# Patient Record
Sex: MALE | Race: WHITE | NOT HISPANIC OR LATINO
[De-identification: names, ages, dates, MRNs, and addresses within clinical notes are randomized per-mention and may not be internally consistent; named-entity substitution may affect disease eponyms.]

---

## 2017-02-16 ENCOUNTER — RX RENEWAL (OUTPATIENT)
Age: 82
End: 2017-02-16

## 2017-02-25 ENCOUNTER — EMERGENCY (EMERGENCY)
Facility: HOSPITAL | Age: 82
LOS: 1 days | Discharge: HOME | End: 2017-02-25
Admitting: EMERGENCY MEDICINE
Payer: MEDICARE

## 2017-02-25 ENCOUNTER — LABORATORY RESULT (OUTPATIENT)
Age: 82
End: 2017-02-25

## 2017-02-25 PROCEDURE — 85027 COMPLETE CBC AUTOMATED: CPT

## 2017-02-25 PROCEDURE — 80053 COMPREHEN METABOLIC PANEL: CPT

## 2017-02-25 PROCEDURE — 86900 BLOOD TYPING SEROLOGIC ABO: CPT

## 2017-02-25 PROCEDURE — 85730 THROMBOPLASTIN TIME PARTIAL: CPT

## 2017-02-25 PROCEDURE — 85610 PROTHROMBIN TIME: CPT

## 2017-02-25 PROCEDURE — 86850 RBC ANTIBODY SCREEN: CPT

## 2017-02-25 PROCEDURE — 99283 EMERGENCY DEPT VISIT LOW MDM: CPT | Mod: 25

## 2017-02-25 PROCEDURE — 36415 COLL VENOUS BLD VENIPUNCTURE: CPT

## 2017-02-25 PROCEDURE — 99284 EMERGENCY DEPT VISIT MOD MDM: CPT

## 2017-02-27 LAB
25(OH)D3 SERPL-MCNC: 34.8 NG/ML
ALBUMIN SERPL ELPH-MCNC: 4 G/DL
ALP BLD-CCNC: 77 U/L
ALT SERPL-CCNC: 14 U/L
ANION GAP SERPL CALC-SCNC: 15 MMOL/L
APPEARANCE: CLEAR
AST SERPL-CCNC: 21 U/L
BASOPHILS # BLD AUTO: 0.03 K/UL
BASOPHILS NFR BLD AUTO: 0.5 %
BILIRUB SERPL-MCNC: 0.9 MG/DL
BILIRUBIN URINE: NEGATIVE
BLOOD URINE: NEGATIVE
BUN SERPL-MCNC: 24 MG/DL
CALCIUM SERPL-MCNC: 10.2 MG/DL
CHLORIDE SERPL-SCNC: 104 MMOL/L
CHOLEST SERPL-MCNC: 172 MG/DL
CHOLEST/HDLC SERPL: 3.2 RATIO
CO2 SERPL-SCNC: 23 MMOL/L
COLOR: YELLOW
CREAT SERPL-MCNC: 1.33 MG/DL
CRP SERPL HS-MCNC: 3.7 MG/L
EOSINOPHIL # BLD AUTO: 0.36 K/UL
EOSINOPHIL NFR BLD AUTO: 6.5 %
GLUCOSE BS SERPL-MCNC: 90 MG/DL
GLUCOSE QUALITATIVE U: NORMAL MG/DL
GLUCOSE SERPL-MCNC: 94 MG/DL
HBA1C MFR BLD HPLC: 5.6 %
HCT VFR BLD CALC: 41.4 %
HDLC SERPL-MCNC: 53 MG/DL
HGB BLD-MCNC: 13.7 G/DL
IMM GRANULOCYTES NFR BLD AUTO: 0.2 %
KETONES URINE: NEGATIVE
LDLC SERPL CALC-MCNC: 109 MG/DL
LEUKOCYTE ESTERASE URINE: NEGATIVE
LYMPHOCYTES # BLD AUTO: 1.62 K/UL
LYMPHOCYTES NFR BLD AUTO: 29.2 %
MAN DIFF?: NORMAL
MCHC RBC-ENTMCNC: 30.1 PG
MCHC RBC-ENTMCNC: 33.1 GM/DL
MCV RBC AUTO: 91 FL
MONOCYTES # BLD AUTO: 0.52 K/UL
MONOCYTES NFR BLD AUTO: 9.4 %
NEUTROPHILS # BLD AUTO: 3.01 K/UL
NEUTROPHILS NFR BLD AUTO: 54.2 %
NITRITE URINE: NEGATIVE
PH URINE: 6.5
PLATELET # BLD AUTO: 12 K/UL
POTASSIUM SERPL-SCNC: 4.2 MMOL/L
PROT SERPL-MCNC: 7.6 G/DL
PROTEIN URINE: NEGATIVE MG/DL
PSA FREE FLD-MCNC: 30.3 %
PSA FREE SERPL-MCNC: 0.9 NG/ML
PSA SERPL-MCNC: 2.96 NG/ML
RBC # BLD: 4.55 M/UL
RBC # FLD: 13.7 %
SODIUM SERPL-SCNC: 142 MMOL/L
SPECIFIC GRAVITY URINE: 1.02
T4 FREE SERPL-MCNC: 1 NG/DL
TRIGL SERPL-MCNC: 50 MG/DL
TSH SERPL-ACNC: 5.75 UIU/ML
UROBILINOGEN URINE: NORMAL MG/DL
VIT B12 SERPL-MCNC: 451 PG/ML
WBC # FLD AUTO: 5.55 K/UL

## 2017-02-28 ENCOUNTER — APPOINTMENT (OUTPATIENT)
Dept: INTERNAL MEDICINE | Facility: CLINIC | Age: 82
End: 2017-02-28

## 2017-02-28 ENCOUNTER — OUTPATIENT (OUTPATIENT)
Dept: OUTPATIENT SERVICES | Facility: HOSPITAL | Age: 82
LOS: 1 days | End: 2017-02-28
Payer: MEDICARE

## 2017-02-28 ENCOUNTER — APPOINTMENT (OUTPATIENT)
Dept: HEMATOLOGY ONCOLOGY | Facility: CLINIC | Age: 82
End: 2017-02-28

## 2017-02-28 VITALS
RESPIRATION RATE: 15 BRPM | WEIGHT: 170 LBS | BODY MASS INDEX: 25.18 KG/M2 | HEIGHT: 69 IN | SYSTOLIC BLOOD PRESSURE: 128 MMHG | HEART RATE: 74 BPM | DIASTOLIC BLOOD PRESSURE: 76 MMHG

## 2017-02-28 VITALS
WEIGHT: 166 LBS | SYSTOLIC BLOOD PRESSURE: 132 MMHG | TEMPERATURE: 96.3 F | OXYGEN SATURATION: 95 % | DIASTOLIC BLOOD PRESSURE: 78 MMHG | HEIGHT: 69.5 IN | RESPIRATION RATE: 14 BRPM | BODY MASS INDEX: 24.04 KG/M2 | HEART RATE: 73 BPM

## 2017-02-28 DIAGNOSIS — D41.4 NEOPLASM OF UNCERTAIN BEHAVIOR OF BLADDER: ICD-10-CM

## 2017-02-28 DIAGNOSIS — Z87.39 PERSONAL HISTORY OF OTHER DISEASES OF THE MUSCULOSKELETAL SYSTEM AND CONNECTIVE TISSUE: ICD-10-CM

## 2017-02-28 DIAGNOSIS — Z82.49 FAMILY HISTORY OF ISCHEMIC HEART DISEASE AND OTHER DISEASES OF THE CIRCULATORY SYSTEM: ICD-10-CM

## 2017-02-28 DIAGNOSIS — S22.32XA FRACTURE OF ONE RIB, LEFT SIDE, INITIAL ENCOUNTER FOR CLOSED FRACTURE: ICD-10-CM

## 2017-02-28 DIAGNOSIS — Z80.9 FAMILY HISTORY OF MALIGNANT NEOPLASM, UNSPECIFIED: ICD-10-CM

## 2017-02-28 DIAGNOSIS — Z85.828 PERSONAL HISTORY OF OTHER MALIGNANT NEOPLASM OF SKIN: ICD-10-CM

## 2017-02-28 NOTE — PHYSICAL EXAM
[General Appearance - Alert] : alert [General Appearance - In No Acute Distress] : in no acute distress [Sclera] : the sclera and conjunctiva were normal [PERRL With Normal Accommodation] : pupils were equal in size, round, and reactive to light [Extraocular Movements] : extraocular movements were intact [Outer Ear] : the ears and nose were normal in appearance [Oropharynx] : the oropharynx was normal [Neck Appearance] : the appearance of the neck was normal [Neck Cervical Mass (___cm)] : no neck mass was observed [Jugular Venous Distention Increased] : there was no jugular-venous distention [Thyroid Diffuse Enlargement] : the thyroid was not enlarged [Thyroid Nodule] : there were no palpable thyroid nodules [Auscultation Breath Sounds / Voice Sounds] : lungs were clear to auscultation bilaterally [Heart Rate And Rhythm] : heart rate was normal and rhythm regular [Heart Sounds] : normal S1 and S2 [Heart Sounds Gallop] : no gallops [Murmurs] : no murmurs [Heart Sounds Pericardial Friction Rub] : no pericardial rub [Full Pulse] : the pedal pulses are present [Edema] : there was no peripheral edema [Bowel Sounds] : normal bowel sounds [Abdomen Soft] : soft [Abdomen Tenderness] : non-tender [Abdomen Mass (___ Cm)] : no abdominal mass palpated [Cervical Lymph Nodes Enlarged Posterior Bilaterally] : posterior cervical [Cervical Lymph Nodes Enlarged Anterior Bilaterally] : anterior cervical [Supraclavicular Lymph Nodes Enlarged Bilaterally] : supraclavicular [Axillary Lymph Nodes Enlarged Bilaterally] : axillary [Femoral Lymph Nodes Enlarged Bilaterally] : femoral [Inguinal Lymph Nodes Enlarged Bilaterally] : inguinal [No CVA Tenderness] : no ~M costovertebral angle tenderness [No Spinal Tenderness] : no spinal tenderness [Abnormal Walk] : normal gait [Nail Clubbing] : no clubbing  or cyanosis of the fingernails [Musculoskeletal - Swelling] : no joint swelling seen [Motor Tone] : muscle strength and tone were normal [Skin Color & Pigmentation] : normal skin color and pigmentation [Skin Turgor] : normal skin turgor [] : no rash [Deep Tendon Reflexes (DTR)] : deep tendon reflexes were 2+ and symmetric [Sensation] : the sensory exam was normal to light touch and pinprick [No Focal Deficits] : no focal deficits [Oriented To Time, Place, And Person] : oriented to person, place, and time [Impaired Insight] : insight and judgment were intact [Affect] : the affect was normal

## 2017-02-28 NOTE — REASON FOR VISIT
[Follow-Up] : a follow-up visit [FreeTextEntry1] : comes to the office for followup to review his recent blood tests which includes low platelets and to discuss his health

## 2017-02-28 NOTE — ASSESSMENT
[FreeTextEntry1] : Physical examination shows a elderly man in no acute distress patient's weight was 171 pounds blood pressure was 128/80 pulse is 72 and regular respiratory rate was 14 HEENT was unremarkable chest was clear cardiovascular was regular abdomen soft extremities show no clubbing edema or cyanosis there was no purpura. Neurologic exam was nonfocal impression thrombocytopenia platelet count of 14,000 uncertain etiology. Will discuss with hematologist was begun him on a course of steroids with a repeat platelet count at the end of the week patient's blood pressure seems well controlled the rest of his blood tests were reviewed and were within normal limits

## 2017-02-28 NOTE — HISTORY OF PRESENT ILLNESS
[FreeTextEntry1] : 86-year-old man comes to the office for followup his past history of Menan's disease and hypertension of note blood tests for this appointment were done on Saturday and his platelet count was 12,000 he was sent to the emergency room where his white count was 14,000 he was discharged into her hematologist this morning and was started on. Steroids

## 2017-03-03 ENCOUNTER — APPOINTMENT (OUTPATIENT)
Dept: HEMATOLOGY ONCOLOGY | Facility: CLINIC | Age: 82
End: 2017-03-03

## 2017-03-05 LAB
BASOPHILS # BLD AUTO: 0 K/UL
BASOPHILS NFR BLD AUTO: 0 %
EOSINOPHIL # BLD AUTO: 0 K/UL
EOSINOPHIL NFR BLD AUTO: 0 %
HCT VFR BLD CALC: 39.3 %
HGB BLD-MCNC: 13 G/DL
IMM GRANULOCYTES NFR BLD AUTO: 0.3 %
LYMPHOCYTES # BLD AUTO: 1.32 K/UL
LYMPHOCYTES NFR BLD AUTO: 11.3 %
MAN DIFF?: NORMAL
MCHC RBC-ENTMCNC: 29.3 PG
MCHC RBC-ENTMCNC: 33.1 GM/DL
MCV RBC AUTO: 88.5 FL
MONOCYTES # BLD AUTO: 1.15 K/UL
MONOCYTES NFR BLD AUTO: 9.8 %
NEUTROPHILS # BLD AUTO: 9.21 K/UL
NEUTROPHILS NFR BLD AUTO: 78.6 %
PLATELET # BLD AUTO: 143 K/UL
RBC # BLD: 4.44 M/UL
RBC # FLD: 13.9 %
WBC # FLD AUTO: 11.72 K/UL

## 2017-03-06 ENCOUNTER — APPOINTMENT (OUTPATIENT)
Dept: HEMATOLOGY ONCOLOGY | Facility: CLINIC | Age: 82
End: 2017-03-06

## 2017-03-06 VITALS
HEART RATE: 80 BPM | SYSTOLIC BLOOD PRESSURE: 118 MMHG | WEIGHT: 174 LBS | BODY MASS INDEX: 25.7 KG/M2 | TEMPERATURE: 97.6 F | DIASTOLIC BLOOD PRESSURE: 60 MMHG

## 2017-03-10 ENCOUNTER — TRANSCRIPTION ENCOUNTER (OUTPATIENT)
Age: 82
End: 2017-03-10

## 2017-03-13 LAB
BASOPHILS # BLD AUTO: 0.01 K/UL
BASOPHILS NFR BLD AUTO: 0.1 %
EOSINOPHIL # BLD AUTO: 0 K/UL
EOSINOPHIL NFR BLD AUTO: 0 %
HCT VFR BLD CALC: 40.1 %
HGB BLD-MCNC: 13.3 G/DL
IMM GRANULOCYTES NFR BLD AUTO: 1.2 %
LYMPHOCYTES # BLD AUTO: 0.83 K/UL
LYMPHOCYTES NFR BLD AUTO: 7.4 %
MAN DIFF?: NORMAL
MCHC RBC-ENTMCNC: 30.2 PG
MCHC RBC-ENTMCNC: 33.2 GM/DL
MCV RBC AUTO: 90.9 FL
MONOCYTES # BLD AUTO: 0.91 K/UL
MONOCYTES NFR BLD AUTO: 8.1 %
NEUTROPHILS # BLD AUTO: 9.4 K/UL
NEUTROPHILS NFR BLD AUTO: 83.2 %
PLATELET # BLD AUTO: 157 K/UL
RBC # BLD: 4.41 M/UL
RBC # FLD: 14.2 %
WBC # FLD AUTO: 11.28 K/UL

## 2017-03-17 ENCOUNTER — LABORATORY RESULT (OUTPATIENT)
Age: 82
End: 2017-03-17

## 2017-03-17 LAB
BASOPHILS # BLD AUTO: 0 K/UL
BASOPHILS NFR BLD AUTO: 0 %
EOSINOPHIL # BLD AUTO: 0 K/UL
EOSINOPHIL NFR BLD AUTO: 0 %
HCT VFR BLD CALC: 42.4 %
HGB BLD-MCNC: 13.8 G/DL
IMM GRANULOCYTES NFR BLD AUTO: 0.3 %
LYMPHOCYTES # BLD AUTO: 0.59 K/UL
LYMPHOCYTES NFR BLD AUTO: 5.3 %
MAN DIFF?: NORMAL
MCHC RBC-ENTMCNC: 29.6 PG
MCHC RBC-ENTMCNC: 32.5 GM/DL
MCV RBC AUTO: 91 FL
MONOCYTES # BLD AUTO: 0.21 K/UL
MONOCYTES NFR BLD AUTO: 1.9 %
NEUTROPHILS # BLD AUTO: 10.32 K/UL
NEUTROPHILS NFR BLD AUTO: 92.5 %
PLATELET # BLD AUTO: 39 K/UL
RBC # BLD: 4.66 M/UL
RBC # FLD: 15 %
WBC # FLD AUTO: 11.15 K/UL

## 2017-03-21 ENCOUNTER — APPOINTMENT (OUTPATIENT)
Dept: INFUSION THERAPY | Facility: HOSPITAL | Age: 82
End: 2017-03-21

## 2017-03-21 ENCOUNTER — APPOINTMENT (OUTPATIENT)
Dept: HEMATOLOGY ONCOLOGY | Facility: CLINIC | Age: 82
End: 2017-03-21

## 2017-03-21 VITALS
HEART RATE: 68 BPM | BODY MASS INDEX: 25.84 KG/M2 | DIASTOLIC BLOOD PRESSURE: 77 MMHG | TEMPERATURE: 98.2 F | WEIGHT: 175 LBS | SYSTOLIC BLOOD PRESSURE: 126 MMHG

## 2017-03-21 RX ORDER — LEVOFLOXACIN 500 MG/1
500 TABLET, FILM COATED ORAL
Qty: 10 | Refills: 0 | Status: DISCONTINUED | COMMUNITY
Start: 2017-01-04

## 2017-03-28 ENCOUNTER — APPOINTMENT (OUTPATIENT)
Dept: INFUSION THERAPY | Facility: HOSPITAL | Age: 82
End: 2017-03-28

## 2017-03-28 LAB
BASOPHILS # BLD AUTO: 0.02 K/UL
BASOPHILS NFR BLD AUTO: 0.2 %
EOSINOPHIL # BLD AUTO: 0.06 K/UL
EOSINOPHIL NFR BLD AUTO: 0.5 %
HCT VFR BLD CALC: 43.2 %
HGB BLD-MCNC: 13.9 G/DL
IMM GRANULOCYTES NFR BLD AUTO: 1.1 %
LYMPHOCYTES # BLD AUTO: 0.87 K/UL
LYMPHOCYTES NFR BLD AUTO: 7 %
MAN DIFF?: NORMAL
MCHC RBC-ENTMCNC: 29.9 PG
MCHC RBC-ENTMCNC: 32.2 GM/DL
MCV RBC AUTO: 92.9 FL
MONOCYTES # BLD AUTO: 0.94 K/UL
MONOCYTES NFR BLD AUTO: 7.6 %
NEUTROPHILS # BLD AUTO: 10.39 K/UL
NEUTROPHILS NFR BLD AUTO: 83.6 %
PLATELET # BLD AUTO: 263 K/UL
RBC # BLD: 4.65 M/UL
RBC # FLD: 15.8 %
WBC # FLD AUTO: 12.42 K/UL

## 2017-03-28 PROCEDURE — 96372 THER/PROPH/DIAG INJ SC/IM: CPT

## 2017-03-28 PROCEDURE — 85025 COMPLETE CBC W/AUTO DIFF WBC: CPT

## 2017-04-03 ENCOUNTER — LABORATORY RESULT (OUTPATIENT)
Age: 82
End: 2017-04-03

## 2017-04-03 LAB
BASOPHILS # BLD AUTO: 0.01 K/UL
BASOPHILS NFR BLD AUTO: 0.1 %
EOSINOPHIL # BLD AUTO: 0.01 K/UL
EOSINOPHIL NFR BLD AUTO: 0.1 %
HCT VFR BLD CALC: 40.9 %
HGB BLD-MCNC: 13.1 G/DL
IMM GRANULOCYTES NFR BLD AUTO: 0.9 %
LYMPHOCYTES # BLD AUTO: 0.54 K/UL
LYMPHOCYTES NFR BLD AUTO: 4.2 %
MAN DIFF?: NORMAL
MCHC RBC-ENTMCNC: 29.8 PG
MCHC RBC-ENTMCNC: 32 GM/DL
MCV RBC AUTO: 93 FL
MONOCYTES # BLD AUTO: 0.76 K/UL
MONOCYTES NFR BLD AUTO: 6 %
NEUTROPHILS # BLD AUTO: 11.33 K/UL
NEUTROPHILS NFR BLD AUTO: 88.7 %
PLATELET # BLD AUTO: 670 K/UL
RBC # BLD: 4.4 M/UL
RBC # FLD: 15.9 %
WBC # FLD AUTO: 12.77 K/UL

## 2017-04-04 ENCOUNTER — OUTPATIENT (OUTPATIENT)
Dept: OUTPATIENT SERVICES | Facility: HOSPITAL | Age: 82
LOS: 1 days | End: 2017-04-04
Payer: MEDICARE

## 2017-04-04 ENCOUNTER — APPOINTMENT (OUTPATIENT)
Dept: INFUSION THERAPY | Facility: HOSPITAL | Age: 82
End: 2017-04-04

## 2017-04-04 ENCOUNTER — APPOINTMENT (OUTPATIENT)
Dept: HEMATOLOGY ONCOLOGY | Facility: CLINIC | Age: 82
End: 2017-04-04

## 2017-04-04 VITALS
DIASTOLIC BLOOD PRESSURE: 70 MMHG | BODY MASS INDEX: 25.62 KG/M2 | TEMPERATURE: 97.9 F | HEIGHT: 69 IN | SYSTOLIC BLOOD PRESSURE: 140 MMHG | WEIGHT: 173 LBS | HEART RATE: 84 BPM | RESPIRATION RATE: 16 BRPM

## 2017-04-04 RX ORDER — DEXAMETHASONE 4 MG/1
4 TABLET ORAL DAILY
Qty: 40 | Refills: 4 | Status: DISCONTINUED | COMMUNITY
Start: 2017-02-28 | End: 2017-04-04

## 2017-04-04 RX ORDER — ACETAMINOPHEN 500 MG
TABLET ORAL
Refills: 0 | Status: COMPLETED | COMMUNITY
End: 2017-04-04

## 2017-04-04 RX ORDER — OMEGA-3S/DHA/EPA/FISH OIL 300-1000MG
CAPSULE ORAL
Refills: 0 | Status: COMPLETED | COMMUNITY
End: 2017-04-04

## 2017-04-04 RX ORDER — CETIRIZINE HCL 10 MG
TABLET ORAL
Refills: 0 | Status: COMPLETED | COMMUNITY
End: 2017-04-04

## 2017-04-04 RX ORDER — ACETAMINOPHEN 325 MG
TABLET ORAL
Refills: 0 | Status: COMPLETED | COMMUNITY
End: 2017-04-04

## 2017-04-04 RX ORDER — CEPHALEXIN 500 MG/1
500 CAPSULE ORAL
Qty: 20 | Refills: 0 | Status: COMPLETED | COMMUNITY
Start: 2016-12-28 | End: 2017-04-04

## 2017-04-04 RX ORDER — DOXYCYCLINE HYCLATE 100 MG/1
100 TABLET ORAL
Qty: 42 | Refills: 0 | Status: COMPLETED | COMMUNITY
Start: 2016-11-08 | End: 2017-04-04

## 2017-04-10 ENCOUNTER — LABORATORY RESULT (OUTPATIENT)
Age: 82
End: 2017-04-10

## 2017-04-10 LAB
BASOPHILS # BLD AUTO: 0 K/UL
BASOPHILS NFR BLD AUTO: 0 %
EOSINOPHIL # BLD AUTO: 0 K/UL
EOSINOPHIL NFR BLD AUTO: 0 %
HCT VFR BLD CALC: 42 %
HGB BLD-MCNC: 13.4 G/DL
LYMPHOCYTES # BLD AUTO: 0.11 K/UL
LYMPHOCYTES NFR BLD AUTO: 0.9 %
MAN DIFF?: NORMAL
MCHC RBC-ENTMCNC: 30.1 PG
MCHC RBC-ENTMCNC: 31.9 GM/DL
MCV RBC AUTO: 94.4 FL
MONOCYTES # BLD AUTO: 0.44 K/UL
MONOCYTES NFR BLD AUTO: 3.6 %
NEUTROPHILS # BLD AUTO: 11.44 K/UL
NEUTROPHILS NFR BLD AUTO: 92.8 %
PLATELET # BLD AUTO: 443 K/UL
RBC # BLD: 4.45 M/UL
RBC # FLD: 15.7 %
WBC # FLD AUTO: 12.33 K/UL

## 2017-04-11 ENCOUNTER — APPOINTMENT (OUTPATIENT)
Dept: HEMATOLOGY ONCOLOGY | Facility: CLINIC | Age: 82
End: 2017-04-11

## 2017-04-11 VITALS — HEART RATE: 79 BPM | DIASTOLIC BLOOD PRESSURE: 80 MMHG | TEMPERATURE: 98.3 F | SYSTOLIC BLOOD PRESSURE: 136 MMHG

## 2017-04-17 ENCOUNTER — LABORATORY RESULT (OUTPATIENT)
Age: 82
End: 2017-04-17

## 2017-04-18 ENCOUNTER — APPOINTMENT (OUTPATIENT)
Dept: INFUSION THERAPY | Facility: HOSPITAL | Age: 82
End: 2017-04-18

## 2017-04-18 ENCOUNTER — APPOINTMENT (OUTPATIENT)
Dept: HEMATOLOGY ONCOLOGY | Facility: CLINIC | Age: 82
End: 2017-04-18

## 2017-04-18 VITALS
WEIGHT: 167 LBS | BODY MASS INDEX: 24.73 KG/M2 | HEIGHT: 69 IN | HEART RATE: 76 BPM | TEMPERATURE: 97.8 F | SYSTOLIC BLOOD PRESSURE: 124 MMHG | DIASTOLIC BLOOD PRESSURE: 70 MMHG

## 2017-04-24 ENCOUNTER — LABORATORY RESULT (OUTPATIENT)
Age: 82
End: 2017-04-24

## 2017-04-26 ENCOUNTER — APPOINTMENT (OUTPATIENT)
Dept: INFUSION THERAPY | Facility: HOSPITAL | Age: 82
End: 2017-04-26

## 2017-04-26 PROCEDURE — 96372 THER/PROPH/DIAG INJ SC/IM: CPT

## 2017-05-02 ENCOUNTER — LABORATORY RESULT (OUTPATIENT)
Age: 82
End: 2017-05-02

## 2017-05-02 LAB
BASOPHILS # BLD AUTO: 0.04 K/UL
BASOPHILS NFR BLD AUTO: 0.3 %
EOSINOPHIL # BLD AUTO: 0.38 K/UL
EOSINOPHIL NFR BLD AUTO: 3 %
HCT VFR BLD CALC: 39.4 %
HGB BLD-MCNC: 13 G/DL
IMM GRANULOCYTES NFR BLD AUTO: 0.6 %
LYMPHOCYTES # BLD AUTO: 1.85 K/UL
LYMPHOCYTES NFR BLD AUTO: 14.6 %
MAN DIFF?: NORMAL
MCHC RBC-ENTMCNC: 30.8 PG
MCHC RBC-ENTMCNC: 33 GM/DL
MCV RBC AUTO: 93.4 FL
MONOCYTES # BLD AUTO: 0.47 K/UL
MONOCYTES NFR BLD AUTO: 3.7 %
NEUTROPHILS # BLD AUTO: 9.88 K/UL
NEUTROPHILS NFR BLD AUTO: 77.8 %
PLATELET # BLD AUTO: 699 K/UL
RBC # BLD: 4.22 M/UL
RBC # FLD: 15.2 %
WBC # FLD AUTO: 12.7 K/UL

## 2017-05-09 LAB
BASOPHILS # BLD AUTO: 0.02 K/UL
BASOPHILS NFR BLD AUTO: 0.2 %
EOSINOPHIL # BLD AUTO: 0.04 K/UL
EOSINOPHIL NFR BLD AUTO: 0.4 %
HCT VFR BLD CALC: 37.9 %
HGB BLD-MCNC: 12.6 G/DL
IMM GRANULOCYTES NFR BLD AUTO: 0.4 %
LYMPHOCYTES # BLD AUTO: 0.86 K/UL
LYMPHOCYTES NFR BLD AUTO: 9.4 %
MAN DIFF?: NORMAL
MCHC RBC-ENTMCNC: 30.9 PG
MCHC RBC-ENTMCNC: 33.2 GM/DL
MCV RBC AUTO: 92.9 FL
MONOCYTES # BLD AUTO: 0.94 K/UL
MONOCYTES NFR BLD AUTO: 10.3 %
NEUTROPHILS # BLD AUTO: 7.25 K/UL
NEUTROPHILS NFR BLD AUTO: 79.3 %
PLATELET # BLD AUTO: 542 K/UL
RBC # BLD: 4.08 M/UL
RBC # FLD: 14.9 %
WBC # FLD AUTO: 9.15 K/UL

## 2017-05-15 ENCOUNTER — LABORATORY RESULT (OUTPATIENT)
Age: 82
End: 2017-05-15

## 2017-05-16 ENCOUNTER — APPOINTMENT (OUTPATIENT)
Dept: INFUSION THERAPY | Facility: HOSPITAL | Age: 82
End: 2017-05-16

## 2017-05-16 ENCOUNTER — OUTPATIENT (OUTPATIENT)
Dept: OUTPATIENT SERVICES | Facility: HOSPITAL | Age: 82
LOS: 1 days | End: 2017-05-16
Payer: MEDICARE

## 2017-05-26 LAB
BASOPHILS # BLD AUTO: 0.02 K/UL
BASOPHILS NFR BLD AUTO: 0.3 %
EOSINOPHIL # BLD AUTO: 0.38 K/UL
EOSINOPHIL NFR BLD AUTO: 6 %
HCT VFR BLD CALC: 41.8 %
HGB BLD-MCNC: 13.6 G/DL
IMM GRANULOCYTES NFR BLD AUTO: 0.3 %
LYMPHOCYTES # BLD AUTO: 1.32 K/UL
LYMPHOCYTES NFR BLD AUTO: 20.8 %
MAN DIFF?: NORMAL
MCHC RBC-ENTMCNC: 29.8 PG
MCHC RBC-ENTMCNC: 32.5 GM/DL
MCV RBC AUTO: 91.7 FL
MONOCYTES # BLD AUTO: 0.7 K/UL
MONOCYTES NFR BLD AUTO: 11 %
NEUTROPHILS # BLD AUTO: 3.9 K/UL
NEUTROPHILS NFR BLD AUTO: 61.6 %
PLATELET # BLD AUTO: 347 K/UL
RBC # BLD: 4.56 M/UL
RBC # FLD: 14.8 %
WBC # FLD AUTO: 6.34 K/UL

## 2017-05-31 ENCOUNTER — APPOINTMENT (OUTPATIENT)
Dept: HEMATOLOGY ONCOLOGY | Facility: CLINIC | Age: 82
End: 2017-05-31

## 2017-05-31 ENCOUNTER — APPOINTMENT (OUTPATIENT)
Dept: INFUSION THERAPY | Facility: HOSPITAL | Age: 82
End: 2017-05-31

## 2017-05-31 VITALS
SYSTOLIC BLOOD PRESSURE: 132 MMHG | WEIGHT: 166 LBS | TEMPERATURE: 97.9 F | DIASTOLIC BLOOD PRESSURE: 70 MMHG | HEART RATE: 88 BPM | BODY MASS INDEX: 24.51 KG/M2

## 2017-05-31 RX ORDER — PREDNISONE 5 MG/1
5 TABLET ORAL DAILY
Qty: 12 | Refills: 1 | Status: COMPLETED | COMMUNITY
Start: 2017-04-18 | End: 2017-05-31

## 2017-05-31 RX ORDER — PANTOPRAZOLE 40 MG/1
40 TABLET, DELAYED RELEASE ORAL
Qty: 30 | Refills: 2 | Status: COMPLETED | COMMUNITY
Start: 2017-02-28 | End: 2017-05-31

## 2017-05-31 RX ORDER — PREDNISONE 20 MG/1
20 TABLET ORAL
Qty: 60 | Refills: 3 | Status: COMPLETED | COMMUNITY
Start: 2017-03-06 | End: 2017-05-31

## 2017-06-06 ENCOUNTER — LABORATORY RESULT (OUTPATIENT)
Age: 82
End: 2017-06-06

## 2017-06-13 ENCOUNTER — APPOINTMENT (OUTPATIENT)
Dept: INFUSION THERAPY | Facility: HOSPITAL | Age: 82
End: 2017-06-13

## 2017-06-13 PROCEDURE — 96372 THER/PROPH/DIAG INJ SC/IM: CPT

## 2017-06-13 PROCEDURE — 85025 COMPLETE CBC W/AUTO DIFF WBC: CPT

## 2017-06-19 ENCOUNTER — APPOINTMENT (OUTPATIENT)
Dept: INTERNAL MEDICINE | Facility: CLINIC | Age: 82
End: 2017-06-19

## 2017-07-03 ENCOUNTER — OUTPATIENT (OUTPATIENT)
Dept: OUTPATIENT SERVICES | Facility: HOSPITAL | Age: 82
LOS: 1 days | End: 2017-07-03
Payer: MEDICARE

## 2017-07-03 ENCOUNTER — APPOINTMENT (OUTPATIENT)
Dept: HEMATOLOGY ONCOLOGY | Facility: CLINIC | Age: 82
End: 2017-07-03

## 2017-07-03 ENCOUNTER — APPOINTMENT (OUTPATIENT)
Dept: INFUSION THERAPY | Facility: HOSPITAL | Age: 82
End: 2017-07-03

## 2017-07-03 VITALS
DIASTOLIC BLOOD PRESSURE: 70 MMHG | WEIGHT: 167 LBS | SYSTOLIC BLOOD PRESSURE: 128 MMHG | HEART RATE: 76 BPM | RESPIRATION RATE: 16 BRPM | BODY MASS INDEX: 24.73 KG/M2 | TEMPERATURE: 98.4 F | HEIGHT: 69 IN

## 2017-07-05 ENCOUNTER — APPOINTMENT (OUTPATIENT)
Dept: INFUSION THERAPY | Facility: HOSPITAL | Age: 82
End: 2017-07-05

## 2017-07-19 ENCOUNTER — LABORATORY RESULT (OUTPATIENT)
Age: 82
End: 2017-07-19

## 2017-07-20 ENCOUNTER — APPOINTMENT (OUTPATIENT)
Dept: HEMATOLOGY ONCOLOGY | Facility: CLINIC | Age: 82
End: 2017-07-20

## 2017-07-20 ENCOUNTER — APPOINTMENT (OUTPATIENT)
Dept: INFUSION THERAPY | Facility: HOSPITAL | Age: 82
End: 2017-07-20

## 2017-07-20 VITALS
HEART RATE: 77 BPM | TEMPERATURE: 98.6 F | BODY MASS INDEX: 24.81 KG/M2 | WEIGHT: 168 LBS | SYSTOLIC BLOOD PRESSURE: 126 MMHG | DIASTOLIC BLOOD PRESSURE: 66 MMHG

## 2017-07-20 LAB
BASOPHILS # BLD AUTO: 0.05 K/UL
BASOPHILS NFR BLD AUTO: 0.7 %
EOSINOPHIL # BLD AUTO: 0.28 K/UL
EOSINOPHIL NFR BLD AUTO: 3.7 %
HCT VFR BLD CALC: 38.3 %
HGB BLD-MCNC: 12.8 G/DL
IMM GRANULOCYTES NFR BLD AUTO: 0.3 %
LYMPHOCYTES # BLD AUTO: 1.32 K/UL
LYMPHOCYTES NFR BLD AUTO: 17.4 %
MAN DIFF?: NORMAL
MCHC RBC-ENTMCNC: 30.7 PG
MCHC RBC-ENTMCNC: 33.4 GM/DL
MCV RBC AUTO: 91.8 FL
MONOCYTES # BLD AUTO: 0.71 K/UL
MONOCYTES NFR BLD AUTO: 9.4 %
NEUTROPHILS # BLD AUTO: 5.19 K/UL
NEUTROPHILS NFR BLD AUTO: 68.5 %
PLATELET # BLD AUTO: 74 K/UL
RBC # BLD: 4.17 M/UL
RBC # FLD: 13.9 %
WBC # FLD AUTO: 7.57 K/UL

## 2017-07-20 PROCEDURE — 96372 THER/PROPH/DIAG INJ SC/IM: CPT

## 2017-07-20 PROCEDURE — 85025 COMPLETE CBC W/AUTO DIFF WBC: CPT

## 2017-08-08 ENCOUNTER — APPOINTMENT (OUTPATIENT)
Dept: HEMATOLOGY ONCOLOGY | Facility: CLINIC | Age: 82
End: 2017-08-08
Payer: MEDICARE

## 2017-08-08 ENCOUNTER — LABORATORY RESULT (OUTPATIENT)
Age: 82
End: 2017-08-08

## 2017-08-08 ENCOUNTER — APPOINTMENT (OUTPATIENT)
Dept: INFUSION THERAPY | Facility: HOSPITAL | Age: 82
End: 2017-08-08

## 2017-08-08 ENCOUNTER — OUTPATIENT (OUTPATIENT)
Dept: OUTPATIENT SERVICES | Facility: HOSPITAL | Age: 82
LOS: 1 days | End: 2017-08-08
Payer: MEDICARE

## 2017-08-08 VITALS
WEIGHT: 164 LBS | BODY MASS INDEX: 24.22 KG/M2 | TEMPERATURE: 97.9 F | DIASTOLIC BLOOD PRESSURE: 70 MMHG | HEART RATE: 88 BPM | SYSTOLIC BLOOD PRESSURE: 124 MMHG

## 2017-08-08 PROCEDURE — 99214 OFFICE O/P EST MOD 30 MIN: CPT | Mod: 25

## 2017-08-08 RX ORDER — ROMIPLOSTIM 250 UG/.5ML
250 INJECTION, POWDER, LYOPHILIZED, FOR SOLUTION SUBCUTANEOUS
Refills: 0 | Status: DISCONTINUED | COMMUNITY
End: 2017-08-08

## 2017-08-22 ENCOUNTER — APPOINTMENT (OUTPATIENT)
Dept: INFUSION THERAPY | Facility: HOSPITAL | Age: 82
End: 2017-08-22

## 2017-08-22 ENCOUNTER — APPOINTMENT (OUTPATIENT)
Dept: HEMATOLOGY ONCOLOGY | Facility: CLINIC | Age: 82
End: 2017-08-22
Payer: MEDICARE

## 2017-08-22 VITALS
TEMPERATURE: 97.6 F | DIASTOLIC BLOOD PRESSURE: 72 MMHG | HEART RATE: 76 BPM | BODY MASS INDEX: 24.22 KG/M2 | WEIGHT: 164 LBS | SYSTOLIC BLOOD PRESSURE: 126 MMHG

## 2017-08-22 PROCEDURE — 99214 OFFICE O/P EST MOD 30 MIN: CPT | Mod: 25

## 2017-08-28 LAB
BASOPHILS # BLD AUTO: 0.03 K/UL
BASOPHILS NFR BLD AUTO: 0.4 %
EOSINOPHIL # BLD AUTO: 0.29 K/UL
EOSINOPHIL NFR BLD AUTO: 4.1 %
HCT VFR BLD CALC: 41.6 %
HGB BLD-MCNC: 13.5 G/DL
IMM GRANULOCYTES NFR BLD AUTO: 0.4 %
LYMPHOCYTES # BLD AUTO: 1.48 K/UL
LYMPHOCYTES NFR BLD AUTO: 21 %
MAN DIFF?: NORMAL
MCHC RBC-ENTMCNC: 29.4 PG
MCHC RBC-ENTMCNC: 32.5 GM/DL
MCV RBC AUTO: 90.6 FL
MONOCYTES # BLD AUTO: 0.99 K/UL
MONOCYTES NFR BLD AUTO: 14 %
NEUTROPHILS # BLD AUTO: 4.23 K/UL
NEUTROPHILS NFR BLD AUTO: 60.1 %
PLATELET # BLD AUTO: 152 K/UL
RBC # BLD: 4.59 M/UL
RBC # FLD: 14 %
WBC # FLD AUTO: 7.05 K/UL

## 2017-09-05 ENCOUNTER — APPOINTMENT (OUTPATIENT)
Dept: INFUSION THERAPY | Facility: HOSPITAL | Age: 82
End: 2017-09-05

## 2017-09-05 ENCOUNTER — RX RENEWAL (OUTPATIENT)
Age: 82
End: 2017-09-05

## 2017-09-05 ENCOUNTER — APPOINTMENT (OUTPATIENT)
Dept: HEMATOLOGY ONCOLOGY | Facility: CLINIC | Age: 82
End: 2017-09-05
Payer: MEDICARE

## 2017-09-05 VITALS
DIASTOLIC BLOOD PRESSURE: 60 MMHG | WEIGHT: 165 LBS | SYSTOLIC BLOOD PRESSURE: 122 MMHG | BODY MASS INDEX: 24.37 KG/M2 | HEART RATE: 98 BPM | TEMPERATURE: 98.2 F

## 2017-09-05 PROCEDURE — 96372 THER/PROPH/DIAG INJ SC/IM: CPT

## 2017-09-05 PROCEDURE — 99214 OFFICE O/P EST MOD 30 MIN: CPT

## 2017-09-05 PROCEDURE — 85025 COMPLETE CBC W/AUTO DIFF WBC: CPT

## 2017-09-05 PROCEDURE — 36415 COLL VENOUS BLD VENIPUNCTURE: CPT

## 2017-09-15 ENCOUNTER — LABORATORY RESULT (OUTPATIENT)
Age: 82
End: 2017-09-15

## 2017-09-15 ENCOUNTER — OTHER (OUTPATIENT)
Age: 82
End: 2017-09-15

## 2017-09-15 LAB
ALBUMIN SERPL ELPH-MCNC: 3.9 G/DL
ALP BLD-CCNC: 66 U/L
ALT SERPL-CCNC: 11 U/L
ANION GAP SERPL CALC-SCNC: 12 MMOL/L
AST SERPL-CCNC: 22 U/L
BASOPHILS # BLD AUTO: 0.04 K/UL
BASOPHILS NFR BLD AUTO: 0.6 %
BILIRUB SERPL-MCNC: 1 MG/DL
BUN SERPL-MCNC: 24 MG/DL
CALCIUM SERPL-MCNC: 10.3 MG/DL
CHLORIDE SERPL-SCNC: 102 MMOL/L
CO2 SERPL-SCNC: 25 MMOL/L
CREAT SERPL-MCNC: 1.37 MG/DL
EOSINOPHIL # BLD AUTO: 0.18 K/UL
EOSINOPHIL NFR BLD AUTO: 2.8 %
GLUCOSE SERPL-MCNC: 86 MG/DL
HCT VFR BLD CALC: 39.7 %
HGB BLD-MCNC: 13.6 G/DL
IMM GRANULOCYTES NFR BLD AUTO: 0.3 %
LYMPHOCYTES # BLD AUTO: 1.54 K/UL
LYMPHOCYTES NFR BLD AUTO: 24 %
MAN DIFF?: NORMAL
MCHC RBC-ENTMCNC: 31 PG
MCHC RBC-ENTMCNC: 34.3 GM/DL
MCV RBC AUTO: 90.4 FL
MONOCYTES # BLD AUTO: 0.77 K/UL
MONOCYTES NFR BLD AUTO: 12 %
NEUTROPHILS # BLD AUTO: 3.87 K/UL
NEUTROPHILS NFR BLD AUTO: 60.3 %
PLATELET # BLD AUTO: 49 K/UL
POTASSIUM SERPL-SCNC: 4.8 MMOL/L
PROT SERPL-MCNC: 7.1 G/DL
RBC # BLD: 4.39 M/UL
RBC # FLD: 13.8 %
SODIUM SERPL-SCNC: 139 MMOL/L
WBC # FLD AUTO: 6.42 K/UL

## 2017-09-18 ENCOUNTER — APPOINTMENT (OUTPATIENT)
Dept: INFUSION THERAPY | Facility: HOSPITAL | Age: 82
End: 2017-09-18

## 2017-09-18 ENCOUNTER — OUTPATIENT (OUTPATIENT)
Dept: OUTPATIENT SERVICES | Facility: HOSPITAL | Age: 82
LOS: 1 days | End: 2017-09-18
Payer: MEDICARE

## 2017-09-20 ENCOUNTER — APPOINTMENT (OUTPATIENT)
Dept: INFUSION THERAPY | Facility: HOSPITAL | Age: 82
End: 2017-09-20

## 2017-09-20 ENCOUNTER — APPOINTMENT (OUTPATIENT)
Dept: HEMATOLOGY ONCOLOGY | Facility: CLINIC | Age: 82
End: 2017-09-20
Payer: MEDICARE

## 2017-09-20 VITALS
RESPIRATION RATE: 16 BRPM | WEIGHT: 164 LBS | TEMPERATURE: 97.2 F | BODY MASS INDEX: 24.22 KG/M2 | HEART RATE: 78 BPM | SYSTOLIC BLOOD PRESSURE: 156 MMHG | DIASTOLIC BLOOD PRESSURE: 64 MMHG

## 2017-09-20 PROCEDURE — 99214 OFFICE O/P EST MOD 30 MIN: CPT

## 2017-09-20 RX ORDER — AMLODIPINE BESYLATE 5 MG/1
5 TABLET ORAL DAILY
Qty: 90 | Refills: 1 | Status: DISCONTINUED | COMMUNITY
Start: 2017-03-16 | End: 2017-09-20

## 2017-09-21 ENCOUNTER — APPOINTMENT (OUTPATIENT)
Dept: INFUSION THERAPY | Facility: HOSPITAL | Age: 82
End: 2017-09-21

## 2017-10-02 ENCOUNTER — APPOINTMENT (OUTPATIENT)
Dept: INFUSION THERAPY | Facility: HOSPITAL | Age: 82
End: 2017-10-02

## 2017-10-02 ENCOUNTER — APPOINTMENT (OUTPATIENT)
Dept: HEMATOLOGY ONCOLOGY | Facility: CLINIC | Age: 82
End: 2017-10-02

## 2017-10-03 ENCOUNTER — APPOINTMENT (OUTPATIENT)
Dept: INFUSION THERAPY | Facility: HOSPITAL | Age: 82
End: 2017-10-03

## 2017-10-06 ENCOUNTER — LABORATORY RESULT (OUTPATIENT)
Age: 82
End: 2017-10-06

## 2017-10-07 LAB
ALBUMIN SERPL ELPH-MCNC: 3.7 G/DL
ALP BLD-CCNC: 69 U/L
ALT SERPL-CCNC: 14 U/L
ANION GAP SERPL CALC-SCNC: 12 MMOL/L
AST SERPL-CCNC: 25 U/L
BASOPHILS # BLD AUTO: 0.03 K/UL
BASOPHILS NFR BLD AUTO: 0.5 %
BILIRUB SERPL-MCNC: 0.6 MG/DL
BUN SERPL-MCNC: 24 MG/DL
CALCIUM SERPL-MCNC: 10.2 MG/DL
CHLORIDE SERPL-SCNC: 102 MMOL/L
CO2 SERPL-SCNC: 24 MMOL/L
CREAT SERPL-MCNC: 1.34 MG/DL
EOSINOPHIL # BLD AUTO: 0.22 K/UL
EOSINOPHIL NFR BLD AUTO: 3.7 %
GLUCOSE SERPL-MCNC: 82 MG/DL
HCT VFR BLD CALC: 40.2 %
HGB BLD-MCNC: 13.3 G/DL
IMM GRANULOCYTES NFR BLD AUTO: 0.2 %
LYMPHOCYTES # BLD AUTO: 1.32 K/UL
LYMPHOCYTES NFR BLD AUTO: 22 %
MAN DIFF?: NORMAL
MCHC RBC-ENTMCNC: 29.9 PG
MCHC RBC-ENTMCNC: 33.1 GM/DL
MCV RBC AUTO: 90.3 FL
MONOCYTES # BLD AUTO: 0.7 K/UL
MONOCYTES NFR BLD AUTO: 11.7 %
NEUTROPHILS # BLD AUTO: 3.71 K/UL
NEUTROPHILS NFR BLD AUTO: 61.9 %
PLATELET # BLD AUTO: 35 K/UL
POTASSIUM SERPL-SCNC: 4.5 MMOL/L
PROT SERPL-MCNC: 7.4 G/DL
RBC # BLD: 4.45 M/UL
RBC # FLD: 13.8 %
SODIUM SERPL-SCNC: 138 MMOL/L
WBC # FLD AUTO: 5.99 K/UL

## 2017-10-09 ENCOUNTER — APPOINTMENT (OUTPATIENT)
Dept: INFUSION THERAPY | Facility: HOSPITAL | Age: 82
End: 2017-10-09

## 2017-10-09 ENCOUNTER — APPOINTMENT (OUTPATIENT)
Dept: HEMATOLOGY ONCOLOGY | Facility: CLINIC | Age: 82
End: 2017-10-09
Payer: MEDICARE

## 2017-10-09 VITALS — HEART RATE: 67 BPM | DIASTOLIC BLOOD PRESSURE: 65 MMHG | SYSTOLIC BLOOD PRESSURE: 141 MMHG

## 2017-10-09 PROCEDURE — 99214 OFFICE O/P EST MOD 30 MIN: CPT

## 2017-10-09 RX ORDER — ROMIPLOSTIM 125 UG/.25ML
INJECTION, POWDER, LYOPHILIZED, FOR SOLUTION SUBCUTANEOUS
Refills: 0 | Status: DISCONTINUED | COMMUNITY
End: 2017-10-09

## 2017-10-09 RX ORDER — AMLODIPINE BESYLATE 5 MG/1
5 TABLET ORAL
Qty: 30 | Refills: 0 | Status: DISCONTINUED | COMMUNITY
End: 2017-10-09

## 2017-10-10 ENCOUNTER — APPOINTMENT (OUTPATIENT)
Dept: INFUSION THERAPY | Facility: HOSPITAL | Age: 82
End: 2017-10-10

## 2017-10-10 PROCEDURE — 96372 THER/PROPH/DIAG INJ SC/IM: CPT

## 2017-10-10 PROCEDURE — 96366 THER/PROPH/DIAG IV INF ADDON: CPT

## 2017-10-10 PROCEDURE — 96365 THER/PROPH/DIAG IV INF INIT: CPT

## 2017-10-10 PROCEDURE — 96375 TX/PRO/DX INJ NEW DRUG ADDON: CPT

## 2017-10-20 ENCOUNTER — LABORATORY RESULT (OUTPATIENT)
Age: 82
End: 2017-10-20

## 2017-10-21 LAB
ALBUMIN SERPL ELPH-MCNC: 3.7 G/DL
ALP BLD-CCNC: 73 U/L
ALT SERPL-CCNC: 13 U/L
ANION GAP SERPL CALC-SCNC: 14 MMOL/L
AST SERPL-CCNC: 21 U/L
BASOPHILS # BLD AUTO: 0.03 K/UL
BASOPHILS NFR BLD AUTO: 0.4 %
BILIRUB SERPL-MCNC: 0.7 MG/DL
BUN SERPL-MCNC: 22 MG/DL
CALCIUM SERPL-MCNC: 10.3 MG/DL
CHLORIDE SERPL-SCNC: 103 MMOL/L
CO2 SERPL-SCNC: 25 MMOL/L
CREAT SERPL-MCNC: 1.4 MG/DL
EOSINOPHIL # BLD AUTO: 0.12 K/UL
EOSINOPHIL NFR BLD AUTO: 1.6 %
GLUCOSE SERPL-MCNC: 90 MG/DL
HCT VFR BLD CALC: 40.1 %
HGB BLD-MCNC: 13.5 G/DL
IMM GRANULOCYTES NFR BLD AUTO: 0.1 %
LYMPHOCYTES # BLD AUTO: 1.51 K/UL
LYMPHOCYTES NFR BLD AUTO: 20.2 %
MAN DIFF?: NORMAL
MCHC RBC-ENTMCNC: 30.4 PG
MCHC RBC-ENTMCNC: 33.7 GM/DL
MCV RBC AUTO: 90.3 FL
MONOCYTES # BLD AUTO: 0.87 K/UL
MONOCYTES NFR BLD AUTO: 11.6 %
NEUTROPHILS # BLD AUTO: 4.94 K/UL
NEUTROPHILS NFR BLD AUTO: 66.1 %
PLATELET # BLD AUTO: 9 K/UL
POTASSIUM SERPL-SCNC: 4.7 MMOL/L
PROT SERPL-MCNC: 7.8 G/DL
RBC # BLD: 4.44 M/UL
RBC # FLD: 14.2 %
SODIUM SERPL-SCNC: 142 MMOL/L
WBC # FLD AUTO: 7.48 K/UL

## 2017-10-23 ENCOUNTER — APPOINTMENT (OUTPATIENT)
Dept: INFUSION THERAPY | Facility: HOSPITAL | Age: 82
End: 2017-10-23

## 2017-10-23 ENCOUNTER — OUTPATIENT (OUTPATIENT)
Dept: OUTPATIENT SERVICES | Facility: HOSPITAL | Age: 82
LOS: 1 days | End: 2017-10-23
Payer: MEDICARE

## 2017-10-23 ENCOUNTER — APPOINTMENT (OUTPATIENT)
Dept: HEMATOLOGY ONCOLOGY | Facility: CLINIC | Age: 82
End: 2017-10-23
Payer: MEDICARE

## 2017-10-23 VITALS
BODY MASS INDEX: 24.96 KG/M2 | HEART RATE: 72 BPM | WEIGHT: 169 LBS | SYSTOLIC BLOOD PRESSURE: 126 MMHG | TEMPERATURE: 98.3 F | DIASTOLIC BLOOD PRESSURE: 60 MMHG

## 2017-10-23 PROCEDURE — 99214 OFFICE O/P EST MOD 30 MIN: CPT

## 2017-10-24 ENCOUNTER — APPOINTMENT (OUTPATIENT)
Dept: INFUSION THERAPY | Facility: HOSPITAL | Age: 82
End: 2017-10-24

## 2017-11-01 ENCOUNTER — LABORATORY RESULT (OUTPATIENT)
Age: 82
End: 2017-11-01

## 2017-11-01 ENCOUNTER — APPOINTMENT (OUTPATIENT)
Dept: HEMATOLOGY ONCOLOGY | Facility: CLINIC | Age: 82
End: 2017-11-01
Payer: MEDICARE

## 2017-11-01 VITALS — SYSTOLIC BLOOD PRESSURE: 116 MMHG | DIASTOLIC BLOOD PRESSURE: 58 MMHG | TEMPERATURE: 97.7 F

## 2017-11-01 PROCEDURE — G0364: CPT

## 2017-11-01 PROCEDURE — 38221 DX BONE MARROW BIOPSIES: CPT

## 2017-11-02 ENCOUNTER — LABORATORY RESULT (OUTPATIENT)
Age: 82
End: 2017-11-02

## 2017-11-08 ENCOUNTER — LABORATORY RESULT (OUTPATIENT)
Age: 82
End: 2017-11-08

## 2017-11-08 ENCOUNTER — RESULT REVIEW (OUTPATIENT)
Age: 82
End: 2017-11-08

## 2017-11-09 ENCOUNTER — APPOINTMENT (OUTPATIENT)
Dept: HEMATOLOGY ONCOLOGY | Facility: CLINIC | Age: 82
End: 2017-11-09
Payer: MEDICARE

## 2017-11-09 ENCOUNTER — APPOINTMENT (OUTPATIENT)
Dept: INFUSION THERAPY | Facility: HOSPITAL | Age: 82
End: 2017-11-09

## 2017-11-09 VITALS
WEIGHT: 170 LBS | DIASTOLIC BLOOD PRESSURE: 80 MMHG | TEMPERATURE: 98 F | BODY MASS INDEX: 25.1 KG/M2 | SYSTOLIC BLOOD PRESSURE: 140 MMHG

## 2017-11-09 PROCEDURE — 99215 OFFICE O/P EST HI 40 MIN: CPT

## 2017-11-17 ENCOUNTER — RESULT REVIEW (OUTPATIENT)
Age: 82
End: 2017-11-17

## 2017-11-17 ENCOUNTER — APPOINTMENT (OUTPATIENT)
Dept: INFUSION THERAPY | Facility: HOSPITAL | Age: 82
End: 2017-11-17

## 2017-11-17 PROCEDURE — 96375 TX/PRO/DX INJ NEW DRUG ADDON: CPT

## 2017-11-17 PROCEDURE — 85025 COMPLETE CBC W/AUTO DIFF WBC: CPT

## 2017-11-17 PROCEDURE — 96365 THER/PROPH/DIAG IV INF INIT: CPT

## 2017-11-17 PROCEDURE — 96413 CHEMO IV INFUSION 1 HR: CPT

## 2017-11-17 PROCEDURE — 85027 COMPLETE CBC AUTOMATED: CPT

## 2017-11-17 PROCEDURE — 96415 CHEMO IV INFUSION ADDL HR: CPT

## 2017-11-17 PROCEDURE — 36415 COLL VENOUS BLD VENIPUNCTURE: CPT

## 2017-11-17 PROCEDURE — 96366 THER/PROPH/DIAG IV INF ADDON: CPT

## 2017-11-18 LAB
BASOPHILS # BLD AUTO: 0.03 K/UL
BASOPHILS NFR BLD AUTO: 0 %
EOSINOPHIL # BLD AUTO: 0.17 K/UL
EOSINOPHIL NFR BLD AUTO: 3 %
HCT VFR BLD CALC: 38.9 %
HGB BLD-MCNC: 13.2 G/DL
IMM GRANULOCYTES NFR BLD AUTO: 0 %
LYMPHOCYTES # BLD AUTO: 1.6 K/UL
LYMPHOCYTES NFR BLD AUTO: 27.8 %
MAN DIFF?: NORMAL
MCHC RBC-ENTMCNC: 31.1 PG
MCHC RBC-ENTMCNC: 33.9 GM/DL
MCV RBC AUTO: 91.7 FL
MONOCYTES # BLD AUTO: 0.55 K/UL
MONOCYTES NFR BLD AUTO: 9.6 %
NEUTROPHILS # BLD AUTO: 3.39 K/UL
NEUTROPHILS NFR BLD AUTO: 58.9 %
PLATELET # BLD AUTO: 27 K/UL
RBC # BLD: 4.24 M/UL
RBC # FLD: 15 %
WBC # FLD AUTO: 5.75 K/UL

## 2017-11-22 ENCOUNTER — RESULT REVIEW (OUTPATIENT)
Age: 82
End: 2017-11-22

## 2017-11-22 ENCOUNTER — LABORATORY RESULT (OUTPATIENT)
Age: 82
End: 2017-11-22

## 2017-11-24 ENCOUNTER — APPOINTMENT (OUTPATIENT)
Dept: INFUSION THERAPY | Facility: HOSPITAL | Age: 82
End: 2017-11-24

## 2017-11-24 ENCOUNTER — APPOINTMENT (OUTPATIENT)
Dept: HEMATOLOGY ONCOLOGY | Facility: CLINIC | Age: 82
End: 2017-11-24
Payer: MEDICARE

## 2017-11-24 ENCOUNTER — OUTPATIENT (OUTPATIENT)
Dept: OUTPATIENT SERVICES | Facility: HOSPITAL | Age: 82
LOS: 1 days | End: 2017-11-24
Payer: MEDICARE

## 2017-11-24 ENCOUNTER — RESULT REVIEW (OUTPATIENT)
Age: 82
End: 2017-11-24

## 2017-11-24 VITALS — DIASTOLIC BLOOD PRESSURE: 74 MMHG | SYSTOLIC BLOOD PRESSURE: 133 MMHG | TEMPERATURE: 97.2 F | HEART RATE: 66 BPM

## 2017-11-24 PROCEDURE — 99214 OFFICE O/P EST MOD 30 MIN: CPT

## 2017-11-29 ENCOUNTER — APPOINTMENT (OUTPATIENT)
Dept: INFUSION THERAPY | Facility: HOSPITAL | Age: 82
End: 2017-11-29

## 2017-11-29 ENCOUNTER — APPOINTMENT (OUTPATIENT)
Dept: HEMATOLOGY ONCOLOGY | Facility: CLINIC | Age: 82
End: 2017-11-29
Payer: MEDICARE

## 2017-11-29 VITALS
BODY MASS INDEX: 24.96 KG/M2 | WEIGHT: 169 LBS | SYSTOLIC BLOOD PRESSURE: 138 MMHG | TEMPERATURE: 98 F | DIASTOLIC BLOOD PRESSURE: 67 MMHG | HEART RATE: 67 BPM

## 2017-11-29 DIAGNOSIS — D69.6 THROMBOCYTOPENIA, UNSPECIFIED: ICD-10-CM

## 2017-11-29 DIAGNOSIS — I10 ESSENTIAL (PRIMARY) HYPERTENSION: ICD-10-CM

## 2017-11-29 DIAGNOSIS — Z51.11 ENCOUNTER FOR ANTINEOPLASTIC CHEMOTHERAPY: ICD-10-CM

## 2017-11-29 DIAGNOSIS — R11.2 NAUSEA WITH VOMITING, UNSPECIFIED: ICD-10-CM

## 2017-11-29 PROCEDURE — 99215 OFFICE O/P EST HI 40 MIN: CPT

## 2017-11-30 ENCOUNTER — APPOINTMENT (OUTPATIENT)
Dept: HEMATOLOGY ONCOLOGY | Facility: CLINIC | Age: 82
End: 2017-11-30

## 2017-11-30 ENCOUNTER — APPOINTMENT (OUTPATIENT)
Dept: INFUSION THERAPY | Facility: HOSPITAL | Age: 82
End: 2017-11-30

## 2017-12-07 ENCOUNTER — LABORATORY RESULT (OUTPATIENT)
Age: 82
End: 2017-12-07

## 2017-12-08 ENCOUNTER — APPOINTMENT (OUTPATIENT)
Dept: INFUSION THERAPY | Facility: HOSPITAL | Age: 82
End: 2017-12-08

## 2017-12-08 LAB
BASOPHILS # BLD AUTO: 0.03 K/UL
BASOPHILS NFR BLD AUTO: 0.4 %
EOSINOPHIL # BLD AUTO: 0.18 K/UL
EOSINOPHIL NFR BLD AUTO: 2.6 %
HCT VFR BLD CALC: 41.2 %
HGB BLD-MCNC: 14.1 G/DL
IMM GRANULOCYTES NFR BLD AUTO: 0.1 %
LYMPHOCYTES # BLD AUTO: 1.43 K/UL
LYMPHOCYTES NFR BLD AUTO: 20.6 %
MAN DIFF?: NORMAL
MCHC RBC-ENTMCNC: 31.5 PG
MCHC RBC-ENTMCNC: 34.2 GM/DL
MCV RBC AUTO: 92 FL
MONOCYTES # BLD AUTO: 0.67 K/UL
MONOCYTES NFR BLD AUTO: 9.6 %
NEUTROPHILS # BLD AUTO: 4.63 K/UL
NEUTROPHILS NFR BLD AUTO: 66.7 %
PLATELET # BLD AUTO: 23 K/UL
RBC # BLD: 4.48 M/UL
RBC # FLD: 14.9 %
WBC # FLD AUTO: 6.95 K/UL

## 2017-12-13 ENCOUNTER — LABORATORY RESULT (OUTPATIENT)
Age: 82
End: 2017-12-13

## 2017-12-14 ENCOUNTER — APPOINTMENT (OUTPATIENT)
Dept: HEMATOLOGY ONCOLOGY | Facility: CLINIC | Age: 82
End: 2017-12-14

## 2017-12-14 ENCOUNTER — APPOINTMENT (OUTPATIENT)
Dept: INFUSION THERAPY | Facility: HOSPITAL | Age: 82
End: 2017-12-14

## 2017-12-14 LAB
BASOPHILS # BLD AUTO: 0.02 K/UL
BASOPHILS NFR BLD AUTO: 0.3 %
EOSINOPHIL # BLD AUTO: 0.19 K/UL
EOSINOPHIL NFR BLD AUTO: 3.2 %
HCT VFR BLD CALC: 41.6 %
HGB BLD-MCNC: 14.1 G/DL
IMM GRANULOCYTES NFR BLD AUTO: 0.2 %
LYMPHOCYTES # BLD AUTO: 1.35 K/UL
LYMPHOCYTES NFR BLD AUTO: 23 %
MAN DIFF?: NORMAL
MCHC RBC-ENTMCNC: 31.1 PG
MCHC RBC-ENTMCNC: 33.9 GM/DL
MCV RBC AUTO: 91.8 FL
MONOCYTES # BLD AUTO: 0.87 K/UL
MONOCYTES NFR BLD AUTO: 14.8 %
NEUTROPHILS # BLD AUTO: 3.42 K/UL
NEUTROPHILS NFR BLD AUTO: 58.5 %
PLATELET # BLD AUTO: 27 K/UL
RBC # BLD: 4.53 M/UL
RBC # FLD: 14.8 %
WBC # FLD AUTO: 5.86 K/UL

## 2017-12-15 ENCOUNTER — APPOINTMENT (OUTPATIENT)
Dept: INFUSION THERAPY | Facility: HOSPITAL | Age: 82
End: 2017-12-15

## 2017-12-21 ENCOUNTER — APPOINTMENT (OUTPATIENT)
Dept: INFUSION THERAPY | Facility: HOSPITAL | Age: 82
End: 2017-12-21

## 2017-12-22 ENCOUNTER — APPOINTMENT (OUTPATIENT)
Dept: INFUSION THERAPY | Facility: HOSPITAL | Age: 82
End: 2017-12-22

## 2017-12-22 LAB
BASOPHILS # BLD AUTO: 0.03 K/UL
BASOPHILS NFR BLD AUTO: 0.6 %
EOSINOPHIL # BLD AUTO: 0.19 K/UL
EOSINOPHIL NFR BLD AUTO: 3.6
HCT VFR BLD CALC: 41.8 %
HGB BLD-MCNC: 13.7 G/DL
IMM GRANULOCYTES NFR BLD AUTO: 0.2 %
LYMPHOCYTES # BLD AUTO: 1.42 K/UL
LYMPHOCYTES NFR BLD AUTO: 27.2 %
MAN DIFF?: NORMAL
MCHC RBC-ENTMCNC: 30.5 PG
MCHC RBC-ENTMCNC: 32.8 GM/DL
MCV RBC AUTO: 93.1 FL
MONOCYTES # BLD AUTO: 0.7 K/UL
MONOCYTES NFR BLD AUTO: 13.4 %
NEUTROPHILS # BLD AUTO: 2.87 K/UL
NEUTROPHILS NFR BLD AUTO: 55 %
PLATELET # BLD AUTO: 40 K/UL
RBC # BLD: 4.49 M/UL
RBC # FLD: 14.5 %
WBC # FLD AUTO: 5.22 K/UL

## 2017-12-22 PROCEDURE — 80053 COMPREHEN METABOLIC PANEL: CPT

## 2017-12-22 PROCEDURE — 85025 COMPLETE CBC W/AUTO DIFF WBC: CPT

## 2017-12-22 PROCEDURE — 96413 CHEMO IV INFUSION 1 HR: CPT

## 2017-12-22 PROCEDURE — 96415 CHEMO IV INFUSION ADDL HR: CPT

## 2017-12-22 PROCEDURE — 96375 TX/PRO/DX INJ NEW DRUG ADDON: CPT

## 2017-12-22 PROCEDURE — 36415 COLL VENOUS BLD VENIPUNCTURE: CPT

## 2017-12-28 ENCOUNTER — LABORATORY RESULT (OUTPATIENT)
Age: 82
End: 2017-12-28

## 2017-12-28 LAB
BASOPHILS # BLD AUTO: 0.03 K/UL
BASOPHILS NFR BLD AUTO: 0.4 %
EOSINOPHIL # BLD AUTO: 0.15 K/UL
EOSINOPHIL NFR BLD AUTO: 2
HCT VFR BLD CALC: 42.1 %
HGB BLD-MCNC: 13.9 G/DL
IMM GRANULOCYTES NFR BLD AUTO: 0.3 %
LYMPHOCYTES # BLD AUTO: 1.51 K/UL
LYMPHOCYTES NFR BLD AUTO: 20.3 %
MAN DIFF?: NORMAL
MCHC RBC-ENTMCNC: 30.8 PG
MCHC RBC-ENTMCNC: 33 GM/DL
MCV RBC AUTO: 93.1 FL
MONOCYTES # BLD AUTO: 0.94 K/UL
MONOCYTES NFR BLD AUTO: 12.6 %
NEUTROPHILS # BLD AUTO: 4.79 K/UL
NEUTROPHILS NFR BLD AUTO: 64.4 %
PLATELET # BLD AUTO: 34 K/UL
RBC # BLD: 4.52 M/UL
RBC # FLD: 14.3 %
WBC # FLD AUTO: 7.44 K/UL

## 2017-12-28 RX ORDER — IMMUNE GLOBULIN INFUSION (HUMAN) 100 MG/ML
1 INJECTION, SOLUTION INTRAVENOUS; SUBCUTANEOUS
Refills: 0 | Status: COMPLETED | COMMUNITY
End: 2017-12-28

## 2017-12-28 RX ORDER — DEXAMETHASONE 4 MG/1
4 TABLET ORAL
Qty: 40 | Refills: 0 | Status: COMPLETED | COMMUNITY
Start: 2017-10-20 | End: 2017-12-28

## 2017-12-29 ENCOUNTER — APPOINTMENT (OUTPATIENT)
Dept: INFUSION THERAPY | Facility: HOSPITAL | Age: 82
End: 2017-12-29

## 2017-12-29 ENCOUNTER — OUTPATIENT (OUTPATIENT)
Dept: OUTPATIENT SERVICES | Facility: HOSPITAL | Age: 82
LOS: 1 days | End: 2017-12-29
Payer: MEDICARE

## 2017-12-29 DIAGNOSIS — D69.6 THROMBOCYTOPENIA, UNSPECIFIED: ICD-10-CM

## 2018-01-04 ENCOUNTER — APPOINTMENT (OUTPATIENT)
Dept: HEMATOLOGY ONCOLOGY | Facility: CLINIC | Age: 83
End: 2018-01-04

## 2018-01-05 ENCOUNTER — APPOINTMENT (OUTPATIENT)
Dept: HEMATOLOGY ONCOLOGY | Facility: CLINIC | Age: 83
End: 2018-01-05
Payer: MEDICARE

## 2018-01-05 ENCOUNTER — APPOINTMENT (OUTPATIENT)
Dept: INFUSION THERAPY | Facility: HOSPITAL | Age: 83
End: 2018-01-05

## 2018-01-05 VITALS
SYSTOLIC BLOOD PRESSURE: 143 MMHG | WEIGHT: 170 LBS | TEMPERATURE: 96.7 F | HEART RATE: 73 BPM | BODY MASS INDEX: 25.1 KG/M2 | DIASTOLIC BLOOD PRESSURE: 69 MMHG

## 2018-01-05 PROCEDURE — 99214 OFFICE O/P EST MOD 30 MIN: CPT

## 2018-01-10 DIAGNOSIS — Z51.11 ENCOUNTER FOR ANTINEOPLASTIC CHEMOTHERAPY: ICD-10-CM

## 2018-01-11 ENCOUNTER — APPOINTMENT (OUTPATIENT)
Dept: INFUSION THERAPY | Facility: HOSPITAL | Age: 83
End: 2018-01-11

## 2018-01-12 ENCOUNTER — APPOINTMENT (OUTPATIENT)
Dept: INFUSION THERAPY | Facility: HOSPITAL | Age: 83
End: 2018-01-12

## 2018-01-18 ENCOUNTER — APPOINTMENT (OUTPATIENT)
Dept: INFUSION THERAPY | Facility: HOSPITAL | Age: 83
End: 2018-01-18

## 2018-01-25 ENCOUNTER — APPOINTMENT (OUTPATIENT)
Dept: INFUSION THERAPY | Facility: HOSPITAL | Age: 83
End: 2018-01-25

## 2018-01-25 ENCOUNTER — APPOINTMENT (OUTPATIENT)
Dept: HEMATOLOGY ONCOLOGY | Facility: CLINIC | Age: 83
End: 2018-01-25
Payer: MEDICARE

## 2018-01-25 VITALS
BODY MASS INDEX: 25.4 KG/M2 | TEMPERATURE: 97.6 F | RESPIRATION RATE: 16 BRPM | DIASTOLIC BLOOD PRESSURE: 68 MMHG | WEIGHT: 172 LBS | SYSTOLIC BLOOD PRESSURE: 133 MMHG | HEART RATE: 67 BPM

## 2018-01-25 PROCEDURE — 85025 COMPLETE CBC W/AUTO DIFF WBC: CPT

## 2018-01-25 PROCEDURE — 36415 COLL VENOUS BLD VENIPUNCTURE: CPT

## 2018-01-25 PROCEDURE — 96413 CHEMO IV INFUSION 1 HR: CPT

## 2018-01-25 PROCEDURE — 99214 OFFICE O/P EST MOD 30 MIN: CPT

## 2018-01-25 PROCEDURE — 96372 THER/PROPH/DIAG INJ SC/IM: CPT

## 2018-01-25 PROCEDURE — 96415 CHEMO IV INFUSION ADDL HR: CPT

## 2018-01-25 PROCEDURE — 80053 COMPREHEN METABOLIC PANEL: CPT

## 2018-01-25 PROCEDURE — 96375 TX/PRO/DX INJ NEW DRUG ADDON: CPT

## 2018-01-31 ENCOUNTER — LABORATORY RESULT (OUTPATIENT)
Age: 83
End: 2018-01-31

## 2018-01-31 ENCOUNTER — RESULT REVIEW (OUTPATIENT)
Age: 83
End: 2018-01-31

## 2018-02-01 ENCOUNTER — OUTPATIENT (OUTPATIENT)
Dept: OUTPATIENT SERVICES | Facility: HOSPITAL | Age: 83
LOS: 1 days | End: 2018-02-01
Payer: MEDICARE

## 2018-02-01 ENCOUNTER — APPOINTMENT (OUTPATIENT)
Dept: INFUSION THERAPY | Facility: HOSPITAL | Age: 83
End: 2018-02-01

## 2018-02-01 DIAGNOSIS — D69.6 THROMBOCYTOPENIA, UNSPECIFIED: ICD-10-CM

## 2018-02-01 DIAGNOSIS — Z51.11 ENCOUNTER FOR ANTINEOPLASTIC CHEMOTHERAPY: ICD-10-CM

## 2018-02-07 ENCOUNTER — LABORATORY RESULT (OUTPATIENT)
Age: 83
End: 2018-02-07

## 2018-02-08 ENCOUNTER — APPOINTMENT (OUTPATIENT)
Dept: INFUSION THERAPY | Facility: HOSPITAL | Age: 83
End: 2018-02-08

## 2018-02-14 ENCOUNTER — LABORATORY RESULT (OUTPATIENT)
Age: 83
End: 2018-02-14

## 2018-02-15 ENCOUNTER — APPOINTMENT (OUTPATIENT)
Dept: INFUSION THERAPY | Facility: HOSPITAL | Age: 83
End: 2018-02-15

## 2018-02-15 ENCOUNTER — APPOINTMENT (OUTPATIENT)
Dept: HEMATOLOGY ONCOLOGY | Facility: CLINIC | Age: 83
End: 2018-02-15
Payer: MEDICARE

## 2018-02-15 VITALS
WEIGHT: 172 LBS | SYSTOLIC BLOOD PRESSURE: 106 MMHG | HEART RATE: 80 BPM | DIASTOLIC BLOOD PRESSURE: 66 MMHG | TEMPERATURE: 97.5 F | BODY MASS INDEX: 25.4 KG/M2

## 2018-02-15 PROCEDURE — 99214 OFFICE O/P EST MOD 30 MIN: CPT

## 2018-02-15 RX ORDER — RITUXIMAB 10 MG/ML
100 INJECTION, SOLUTION INTRAVENOUS
Refills: 0 | Status: COMPLETED | COMMUNITY
End: 2018-02-15

## 2018-02-15 RX ORDER — DIPHENHYDRAMINE HCL 25 MG
25 CAPSULE ORAL
Refills: 0 | Status: COMPLETED | COMMUNITY
Start: 2017-11-24 | End: 2018-02-15

## 2018-02-21 LAB
BASOPHILS # BLD AUTO: 0.04 K/UL
BASOPHILS NFR BLD AUTO: 0.6 %
EOSINOPHIL # BLD AUTO: 0.16 K/UL
EOSINOPHIL NFR BLD AUTO: 2.2 %
HCT VFR BLD CALC: 42 %
HGB BLD-MCNC: 14 G/DL
IMM GRANULOCYTES NFR BLD AUTO: 0.3 %
LYMPHOCYTES # BLD AUTO: 1.75 K/UL
LYMPHOCYTES NFR BLD AUTO: 24.2 %
MAN DIFF?: NORMAL
MCHC RBC-ENTMCNC: 31 PG
MCHC RBC-ENTMCNC: 33.3 GM/DL
MCV RBC AUTO: 92.9 FL
MONOCYTES # BLD AUTO: 0.95 K/UL
MONOCYTES NFR BLD AUTO: 13.2 %
NEUTROPHILS # BLD AUTO: 4.3 K/UL
NEUTROPHILS NFR BLD AUTO: 59.5 %
PLATELET # BLD AUTO: 165 K/UL
RBC # BLD: 4.52 M/UL
RBC # FLD: 13 %
WBC # FLD AUTO: 7.22 K/UL

## 2018-02-22 ENCOUNTER — APPOINTMENT (OUTPATIENT)
Dept: INFUSION THERAPY | Facility: HOSPITAL | Age: 83
End: 2018-02-22

## 2018-02-28 ENCOUNTER — APPOINTMENT (OUTPATIENT)
Dept: INFUSION THERAPY | Facility: HOSPITAL | Age: 83
End: 2018-02-28

## 2018-02-28 PROCEDURE — 96372 THER/PROPH/DIAG INJ SC/IM: CPT

## 2018-03-01 LAB
BASOPHILS # BLD AUTO: 0.03 K/UL
BASOPHILS NFR BLD AUTO: 0.5 %
EOSINOPHIL # BLD AUTO: 0.28 K/UL
EOSINOPHIL NFR BLD AUTO: 4.7 %
HCT VFR BLD CALC: 41.7 %
HGB BLD-MCNC: 14 G/DL
IMM GRANULOCYTES NFR BLD AUTO: 0.2 %
LYMPHOCYTES # BLD AUTO: 1.59 K/UL
LYMPHOCYTES NFR BLD AUTO: 26.8 %
MAN DIFF?: NORMAL
MCHC RBC-ENTMCNC: 30.3 PG
MCHC RBC-ENTMCNC: 33.6 GM/DL
MCV RBC AUTO: 90.3 FL
MONOCYTES # BLD AUTO: 0.8 K/UL
MONOCYTES NFR BLD AUTO: 13.5 %
NEUTROPHILS # BLD AUTO: 3.23 K/UL
NEUTROPHILS NFR BLD AUTO: 54.3 %
PLATELET # BLD AUTO: 90 K/UL
RBC # BLD: 4.62 M/UL
RBC # FLD: 13.4 %
WBC # FLD AUTO: 5.94 K/UL

## 2018-03-08 ENCOUNTER — OUTPATIENT (OUTPATIENT)
Dept: OUTPATIENT SERVICES | Facility: HOSPITAL | Age: 83
LOS: 1 days | End: 2018-03-08
Payer: MEDICARE

## 2018-03-08 ENCOUNTER — APPOINTMENT (OUTPATIENT)
Dept: INFUSION THERAPY | Facility: HOSPITAL | Age: 83
End: 2018-03-08

## 2018-03-08 DIAGNOSIS — Z51.11 ENCOUNTER FOR ANTINEOPLASTIC CHEMOTHERAPY: ICD-10-CM

## 2018-03-08 DIAGNOSIS — D69.6 THROMBOCYTOPENIA, UNSPECIFIED: ICD-10-CM

## 2018-03-23 ENCOUNTER — APPOINTMENT (OUTPATIENT)
Dept: INFUSION THERAPY | Facility: HOSPITAL | Age: 83
End: 2018-03-23

## 2018-03-30 ENCOUNTER — APPOINTMENT (OUTPATIENT)
Dept: HEMATOLOGY ONCOLOGY | Facility: CLINIC | Age: 83
End: 2018-03-30
Payer: MEDICARE

## 2018-03-30 ENCOUNTER — APPOINTMENT (OUTPATIENT)
Dept: INFUSION THERAPY | Facility: HOSPITAL | Age: 83
End: 2018-03-30

## 2018-03-30 VITALS
TEMPERATURE: 98.1 F | WEIGHT: 170 LBS | HEART RATE: 76 BPM | RESPIRATION RATE: 16 BRPM | SYSTOLIC BLOOD PRESSURE: 108 MMHG | DIASTOLIC BLOOD PRESSURE: 62 MMHG | BODY MASS INDEX: 25.1 KG/M2

## 2018-03-30 PROCEDURE — 85025 COMPLETE CBC W/AUTO DIFF WBC: CPT

## 2018-03-30 PROCEDURE — 36415 COLL VENOUS BLD VENIPUNCTURE: CPT

## 2018-03-30 PROCEDURE — 99214 OFFICE O/P EST MOD 30 MIN: CPT

## 2018-03-30 PROCEDURE — 96372 THER/PROPH/DIAG INJ SC/IM: CPT

## 2018-04-09 ENCOUNTER — APPOINTMENT (OUTPATIENT)
Dept: INFUSION THERAPY | Facility: HOSPITAL | Age: 83
End: 2018-04-09

## 2018-04-09 ENCOUNTER — OUTPATIENT (OUTPATIENT)
Dept: OUTPATIENT SERVICES | Facility: HOSPITAL | Age: 83
LOS: 1 days | End: 2018-04-09
Payer: MEDICARE

## 2018-04-09 DIAGNOSIS — Z51.11 ENCOUNTER FOR ANTINEOPLASTIC CHEMOTHERAPY: ICD-10-CM

## 2018-04-09 DIAGNOSIS — D69.6 THROMBOCYTOPENIA, UNSPECIFIED: ICD-10-CM

## 2018-04-19 ENCOUNTER — APPOINTMENT (OUTPATIENT)
Dept: INFUSION THERAPY | Facility: HOSPITAL | Age: 83
End: 2018-04-19

## 2018-04-23 ENCOUNTER — APPOINTMENT (OUTPATIENT)
Dept: INTERNAL MEDICINE | Facility: CLINIC | Age: 83
End: 2018-04-23
Payer: MEDICARE

## 2018-04-23 VITALS
BODY MASS INDEX: 25.18 KG/M2 | HEIGHT: 69 IN | DIASTOLIC BLOOD PRESSURE: 76 MMHG | SYSTOLIC BLOOD PRESSURE: 124 MMHG | HEART RATE: 72 BPM | WEIGHT: 170 LBS | RESPIRATION RATE: 14 BRPM

## 2018-04-23 DIAGNOSIS — Z78.9 OTHER SPECIFIED HEALTH STATUS: ICD-10-CM

## 2018-04-23 PROCEDURE — 99215 OFFICE O/P EST HI 40 MIN: CPT | Mod: 25

## 2018-04-23 PROCEDURE — 69210 REMOVE IMPACTED EAR WAX UNI: CPT

## 2018-04-24 ENCOUNTER — TRANSCRIPTION ENCOUNTER (OUTPATIENT)
Age: 83
End: 2018-04-24

## 2018-04-25 ENCOUNTER — OUTPATIENT (OUTPATIENT)
Dept: OUTPATIENT SERVICES | Facility: HOSPITAL | Age: 83
LOS: 1 days | End: 2018-04-25
Payer: MEDICARE

## 2018-04-25 DIAGNOSIS — H25.12 AGE-RELATED NUCLEAR CATARACT, LEFT EYE: ICD-10-CM

## 2018-04-25 PROCEDURE — 66984 XCAPSL CTRC RMVL W/O ECP: CPT | Mod: LT

## 2018-04-25 PROCEDURE — C1780: CPT

## 2018-04-30 ENCOUNTER — APPOINTMENT (OUTPATIENT)
Dept: INFUSION THERAPY | Facility: HOSPITAL | Age: 83
End: 2018-04-30

## 2018-04-30 PROCEDURE — 96372 THER/PROPH/DIAG INJ SC/IM: CPT

## 2018-04-30 PROCEDURE — 36415 COLL VENOUS BLD VENIPUNCTURE: CPT

## 2018-04-30 PROCEDURE — 85025 COMPLETE CBC W/AUTO DIFF WBC: CPT

## 2018-05-01 ENCOUNTER — RESULT CHARGE (OUTPATIENT)
Age: 83
End: 2018-05-01

## 2018-05-02 ENCOUNTER — APPOINTMENT (OUTPATIENT)
Dept: ORTHOPEDIC SURGERY | Facility: CLINIC | Age: 83
End: 2018-05-02
Payer: MEDICARE

## 2018-05-02 VITALS
WEIGHT: 170 LBS | BODY MASS INDEX: 25.18 KG/M2 | DIASTOLIC BLOOD PRESSURE: 73 MMHG | SYSTOLIC BLOOD PRESSURE: 152 MMHG | HEART RATE: 79 BPM | HEIGHT: 69 IN

## 2018-05-02 PROCEDURE — 99213 OFFICE O/P EST LOW 20 MIN: CPT

## 2018-05-10 ENCOUNTER — APPOINTMENT (OUTPATIENT)
Dept: INFUSION THERAPY | Facility: HOSPITAL | Age: 83
End: 2018-05-10

## 2018-05-10 ENCOUNTER — OUTPATIENT (OUTPATIENT)
Dept: OUTPATIENT SERVICES | Facility: HOSPITAL | Age: 83
LOS: 1 days | End: 2018-05-10
Payer: MEDICARE

## 2018-05-10 DIAGNOSIS — D69.6 THROMBOCYTOPENIA, UNSPECIFIED: ICD-10-CM

## 2018-05-10 DIAGNOSIS — Z51.11 ENCOUNTER FOR ANTINEOPLASTIC CHEMOTHERAPY: ICD-10-CM

## 2018-05-24 ENCOUNTER — APPOINTMENT (OUTPATIENT)
Dept: HEMATOLOGY ONCOLOGY | Facility: CLINIC | Age: 83
End: 2018-05-24
Payer: MEDICARE

## 2018-05-24 ENCOUNTER — APPOINTMENT (OUTPATIENT)
Dept: INFUSION THERAPY | Facility: HOSPITAL | Age: 83
End: 2018-05-24

## 2018-05-24 VITALS
BODY MASS INDEX: 24.37 KG/M2 | WEIGHT: 165 LBS | TEMPERATURE: 97.9 F | HEART RATE: 72 BPM | DIASTOLIC BLOOD PRESSURE: 60 MMHG | SYSTOLIC BLOOD PRESSURE: 128 MMHG | RESPIRATION RATE: 14 BRPM

## 2018-05-24 PROCEDURE — 99214 OFFICE O/P EST MOD 30 MIN: CPT

## 2018-05-24 RX ORDER — ASPIRIN 81 MG
81 TABLET, DELAYED RELEASE (ENTERIC COATED) ORAL
Refills: 0 | Status: COMPLETED | COMMUNITY
End: 2018-05-24

## 2018-05-31 ENCOUNTER — APPOINTMENT (OUTPATIENT)
Dept: INFUSION THERAPY | Facility: HOSPITAL | Age: 83
End: 2018-05-31

## 2018-06-07 ENCOUNTER — APPOINTMENT (OUTPATIENT)
Dept: INFUSION THERAPY | Facility: HOSPITAL | Age: 83
End: 2018-06-07

## 2018-06-07 PROCEDURE — 36415 COLL VENOUS BLD VENIPUNCTURE: CPT

## 2018-06-07 PROCEDURE — 85025 COMPLETE CBC W/AUTO DIFF WBC: CPT

## 2018-06-07 PROCEDURE — 96372 THER/PROPH/DIAG INJ SC/IM: CPT

## 2018-06-11 ENCOUNTER — OUTPATIENT (OUTPATIENT)
Dept: OUTPATIENT SERVICES | Facility: HOSPITAL | Age: 83
LOS: 1 days | End: 2018-06-11
Payer: MEDICARE

## 2018-06-11 ENCOUNTER — APPOINTMENT (OUTPATIENT)
Dept: RADIOLOGY | Facility: CLINIC | Age: 83
End: 2018-06-11
Payer: MEDICARE

## 2018-06-11 DIAGNOSIS — Z00.8 ENCOUNTER FOR OTHER GENERAL EXAMINATION: ICD-10-CM

## 2018-06-11 PROCEDURE — 73525 CONTRAST X-RAY OF HIP: CPT

## 2018-06-11 PROCEDURE — 73525 CONTRAST X-RAY OF HIP: CPT | Mod: 26,LT

## 2018-06-11 PROCEDURE — 27093 INJECTION FOR HIP X-RAY: CPT | Mod: LT

## 2018-06-11 PROCEDURE — 27093 INJECTION FOR HIP X-RAY: CPT

## 2018-06-14 ENCOUNTER — OUTPATIENT (OUTPATIENT)
Dept: OUTPATIENT SERVICES | Facility: HOSPITAL | Age: 83
LOS: 1 days | End: 2018-06-14
Payer: MEDICARE

## 2018-06-14 ENCOUNTER — APPOINTMENT (OUTPATIENT)
Dept: INFUSION THERAPY | Facility: HOSPITAL | Age: 83
End: 2018-06-14

## 2018-06-14 DIAGNOSIS — Z51.11 ENCOUNTER FOR ANTINEOPLASTIC CHEMOTHERAPY: ICD-10-CM

## 2018-06-14 DIAGNOSIS — D69.6 THROMBOCYTOPENIA, UNSPECIFIED: ICD-10-CM

## 2018-06-21 ENCOUNTER — APPOINTMENT (OUTPATIENT)
Dept: INFUSION THERAPY | Facility: HOSPITAL | Age: 83
End: 2018-06-21

## 2018-06-21 VITALS — HEIGHT: 69 IN | WEIGHT: 164.91 LBS

## 2018-06-21 LAB
HCT VFR BLD CALC: 40.2 % — SIGNIFICANT CHANGE UP (ref 39–50)
HGB BLD-MCNC: 14.3 G/DL — SIGNIFICANT CHANGE UP (ref 13–17)
MCHC RBC-ENTMCNC: 32.6 PG — SIGNIFICANT CHANGE UP (ref 27–34)
MCHC RBC-ENTMCNC: 35.6 GM/DL — SIGNIFICANT CHANGE UP (ref 32–36)
MCV RBC AUTO: 91.6 FL — SIGNIFICANT CHANGE UP (ref 80–100)
PLATELET # BLD AUTO: 123 K/UL — LOW (ref 150–400)
RBC # BLD: 4.39 M/UL — SIGNIFICANT CHANGE UP (ref 4.2–5.8)
RBC # FLD: 12.9 % — SIGNIFICANT CHANGE UP (ref 10.3–14.5)
WBC # BLD: 6.3 K/UL — SIGNIFICANT CHANGE UP (ref 3.8–10.5)
WBC # FLD AUTO: 6.3 K/UL — SIGNIFICANT CHANGE UP (ref 3.8–10.5)

## 2018-06-21 RX ORDER — ROMIPLOSTIM 250 UG/.5ML
225 INJECTION, POWDER, LYOPHILIZED, FOR SOLUTION SUBCUTANEOUS ONCE
Qty: 0 | Refills: 0 | Status: COMPLETED | OUTPATIENT
Start: 2018-06-21 | End: 2018-06-21

## 2018-06-21 RX ADMIN — ROMIPLOSTIM 225 MICROGRAM(S): 250 INJECTION, POWDER, LYOPHILIZED, FOR SOLUTION SUBCUTANEOUS at 12:44

## 2018-06-27 VITALS — WEIGHT: 164.91 LBS | HEIGHT: 69 IN

## 2018-06-28 ENCOUNTER — APPOINTMENT (OUTPATIENT)
Dept: INFUSION THERAPY | Facility: HOSPITAL | Age: 83
End: 2018-06-28

## 2018-06-28 ENCOUNTER — OTHER (OUTPATIENT)
Age: 83
End: 2018-06-28

## 2018-06-28 LAB
ANISOCYTOSIS BLD QL: SLIGHT — SIGNIFICANT CHANGE UP
BASOPHILS NFR BLD AUTO: 1 % — SIGNIFICANT CHANGE UP (ref 0–2)
EOSINOPHIL NFR BLD AUTO: 7 % — HIGH (ref 0–6)
HCT VFR BLD CALC: 41.8 % — SIGNIFICANT CHANGE UP (ref 39–50)
HGB BLD-MCNC: 14.5 G/DL — SIGNIFICANT CHANGE UP (ref 13–17)
LYMPHOCYTES # BLD AUTO: 18 % — SIGNIFICANT CHANGE UP (ref 13–44)
MCHC RBC-ENTMCNC: 31.5 PG — SIGNIFICANT CHANGE UP (ref 27–34)
MCHC RBC-ENTMCNC: 34.8 GM/DL — SIGNIFICANT CHANGE UP (ref 32–36)
MCV RBC AUTO: 90.7 FL — SIGNIFICANT CHANGE UP (ref 80–100)
MONOCYTES NFR BLD AUTO: 15 % — HIGH (ref 2–14)
NEUTROPHILS NFR BLD AUTO: 54 % — SIGNIFICANT CHANGE UP (ref 43–77)
NEUTS BAND # BLD: 5 % — SIGNIFICANT CHANGE UP (ref 0–8)
PLAT MORPH BLD: NORMAL — SIGNIFICANT CHANGE UP
PLATELET # BLD AUTO: 134 K/UL — LOW (ref 150–400)
RBC # BLD: 4.61 M/UL — SIGNIFICANT CHANGE UP (ref 4.2–5.8)
RBC # FLD: 13.1 % — SIGNIFICANT CHANGE UP (ref 10.3–14.5)
RBC BLD AUTO: ABNORMAL
WBC # BLD: 6.5 K/UL — SIGNIFICANT CHANGE UP (ref 3.8–10.5)
WBC # FLD AUTO: 6.5 K/UL — SIGNIFICANT CHANGE UP (ref 3.8–10.5)

## 2018-06-28 PROCEDURE — 85027 COMPLETE CBC AUTOMATED: CPT

## 2018-06-28 PROCEDURE — 85025 COMPLETE CBC W/AUTO DIFF WBC: CPT

## 2018-06-28 PROCEDURE — 96372 THER/PROPH/DIAG INJ SC/IM: CPT

## 2018-06-28 PROCEDURE — 36415 COLL VENOUS BLD VENIPUNCTURE: CPT

## 2018-06-28 RX ORDER — ROMIPLOSTIM 250 UG/.5ML
225 INJECTION, POWDER, LYOPHILIZED, FOR SOLUTION SUBCUTANEOUS ONCE
Qty: 0 | Refills: 0 | Status: COMPLETED | OUTPATIENT
Start: 2018-06-28 | End: 2018-06-28

## 2018-06-28 RX ADMIN — ROMIPLOSTIM 225 MICROGRAM(S): 250 INJECTION, POWDER, LYOPHILIZED, FOR SOLUTION SUBCUTANEOUS at 12:18

## 2018-07-05 ENCOUNTER — APPOINTMENT (OUTPATIENT)
Dept: HEMATOLOGY ONCOLOGY | Facility: CLINIC | Age: 83
End: 2018-07-05
Payer: MEDICARE

## 2018-07-05 ENCOUNTER — APPOINTMENT (OUTPATIENT)
Dept: INFUSION THERAPY | Facility: HOSPITAL | Age: 83
End: 2018-07-05

## 2018-07-05 VITALS
TEMPERATURE: 97.7 F | BODY MASS INDEX: 24.22 KG/M2 | SYSTOLIC BLOOD PRESSURE: 108 MMHG | HEART RATE: 80 BPM | DIASTOLIC BLOOD PRESSURE: 60 MMHG | WEIGHT: 164 LBS

## 2018-07-05 PROCEDURE — 99214 OFFICE O/P EST MOD 30 MIN: CPT

## 2018-07-12 ENCOUNTER — OUTPATIENT (OUTPATIENT)
Dept: OUTPATIENT SERVICES | Facility: HOSPITAL | Age: 83
LOS: 1 days | End: 2018-07-12
Payer: MEDICARE

## 2018-07-12 ENCOUNTER — APPOINTMENT (OUTPATIENT)
Dept: INFUSION THERAPY | Facility: HOSPITAL | Age: 83
End: 2018-07-12

## 2018-07-12 DIAGNOSIS — D69.6 THROMBOCYTOPENIA, UNSPECIFIED: ICD-10-CM

## 2018-07-12 LAB
BASOPHILS # BLD AUTO: 0.1 K/UL — SIGNIFICANT CHANGE UP (ref 0–0.2)
BASOPHILS NFR BLD AUTO: 1.3 % — SIGNIFICANT CHANGE UP (ref 0–2)
EOSINOPHIL # BLD AUTO: 0.4 K/UL — SIGNIFICANT CHANGE UP (ref 0–0.5)
EOSINOPHIL NFR BLD AUTO: 4.7 % — SIGNIFICANT CHANGE UP (ref 0–6)
HCT VFR BLD CALC: 42.3 % — SIGNIFICANT CHANGE UP (ref 39–50)
HGB BLD-MCNC: 14.3 G/DL — SIGNIFICANT CHANGE UP (ref 13–17)
LYMPHOCYTES # BLD AUTO: 1.5 K/UL — SIGNIFICANT CHANGE UP (ref 1–3.3)
LYMPHOCYTES # BLD AUTO: 18.8 % — SIGNIFICANT CHANGE UP (ref 13–44)
MCHC RBC-ENTMCNC: 31.4 PG — SIGNIFICANT CHANGE UP (ref 27–34)
MCHC RBC-ENTMCNC: 33.8 GM/DL — SIGNIFICANT CHANGE UP (ref 32–36)
MCV RBC AUTO: 93 FL — SIGNIFICANT CHANGE UP (ref 80–100)
MONOCYTES # BLD AUTO: 1.1 K/UL — HIGH (ref 0–0.9)
MONOCYTES NFR BLD AUTO: 14.2 % — HIGH (ref 1–9)
NEUTROPHILS # BLD AUTO: 4.8 K/UL — SIGNIFICANT CHANGE UP (ref 1.8–7.4)
NEUTROPHILS NFR BLD AUTO: 61.1 % — SIGNIFICANT CHANGE UP (ref 43–77)
PLATELET # BLD AUTO: 592 K/UL — HIGH (ref 150–400)
RBC # BLD: 4.55 M/UL — SIGNIFICANT CHANGE UP (ref 4.2–5.8)
RBC # FLD: 12.7 % — SIGNIFICANT CHANGE UP (ref 10.3–14.5)
WBC # BLD: 7.9 K/UL — SIGNIFICANT CHANGE UP (ref 3.8–10.5)
WBC # FLD AUTO: 7.9 K/UL — SIGNIFICANT CHANGE UP (ref 3.8–10.5)

## 2018-07-12 PROCEDURE — 36415 COLL VENOUS BLD VENIPUNCTURE: CPT

## 2018-07-12 PROCEDURE — 85027 COMPLETE CBC AUTOMATED: CPT

## 2018-07-18 VITALS — HEIGHT: 69 IN | WEIGHT: 164.02 LBS

## 2018-07-19 ENCOUNTER — LABORATORY RESULT (OUTPATIENT)
Age: 83
End: 2018-07-19

## 2018-07-19 ENCOUNTER — APPOINTMENT (OUTPATIENT)
Dept: INFUSION THERAPY | Facility: HOSPITAL | Age: 83
End: 2018-07-19

## 2018-07-19 ENCOUNTER — OUTPATIENT (OUTPATIENT)
Dept: OUTPATIENT SERVICES | Facility: HOSPITAL | Age: 83
LOS: 1 days | End: 2018-07-19
Payer: MEDICARE

## 2018-07-19 DIAGNOSIS — D69.6 THROMBOCYTOPENIA, UNSPECIFIED: ICD-10-CM

## 2018-07-19 DIAGNOSIS — Z51.11 ENCOUNTER FOR ANTINEOPLASTIC CHEMOTHERAPY: ICD-10-CM

## 2018-07-19 RX ORDER — ROMIPLOSTIM 250 UG/.5ML
225 INJECTION, POWDER, LYOPHILIZED, FOR SOLUTION SUBCUTANEOUS ONCE
Qty: 0 | Refills: 0 | Status: COMPLETED | OUTPATIENT
Start: 2018-07-19 | End: 2018-07-19

## 2018-07-19 RX ADMIN — ROMIPLOSTIM 225 MICROGRAM(S): 250 INJECTION, POWDER, LYOPHILIZED, FOR SOLUTION SUBCUTANEOUS at 12:44

## 2018-07-30 ENCOUNTER — LABORATORY RESULT (OUTPATIENT)
Age: 83
End: 2018-07-30

## 2018-07-30 ENCOUNTER — APPOINTMENT (OUTPATIENT)
Dept: INFUSION THERAPY | Facility: HOSPITAL | Age: 83
End: 2018-07-30

## 2018-07-30 RX ORDER — ROMIPLOSTIM 250 UG/.5ML
225 INJECTION, POWDER, LYOPHILIZED, FOR SOLUTION SUBCUTANEOUS ONCE
Qty: 0 | Refills: 0 | Status: COMPLETED | OUTPATIENT
Start: 2018-07-30 | End: 2018-07-30

## 2018-07-30 RX ADMIN — ROMIPLOSTIM 225 MICROGRAM(S): 250 INJECTION, POWDER, LYOPHILIZED, FOR SOLUTION SUBCUTANEOUS at 11:55

## 2018-08-02 ENCOUNTER — APPOINTMENT (OUTPATIENT)
Dept: INTERNAL MEDICINE | Facility: CLINIC | Age: 83
End: 2018-08-02
Payer: MEDICARE

## 2018-08-02 VITALS — HEIGHT: 69 IN | WEIGHT: 163 LBS | BODY MASS INDEX: 24.14 KG/M2

## 2018-08-02 VITALS — SYSTOLIC BLOOD PRESSURE: 118 MMHG | HEART RATE: 78 BPM | RESPIRATION RATE: 15 BRPM | DIASTOLIC BLOOD PRESSURE: 74 MMHG

## 2018-08-02 DIAGNOSIS — H26.9 UNSPECIFIED CATARACT: ICD-10-CM

## 2018-08-02 DIAGNOSIS — Z78.9 OTHER SPECIFIED HEALTH STATUS: ICD-10-CM

## 2018-08-02 PROCEDURE — 99215 OFFICE O/P EST HI 40 MIN: CPT

## 2018-08-02 NOTE — HISTORY OF PRESENT ILLNESS
[No Pertinent Cardiac History] : no history of aortic stenosis, atrial fibrillation, coronary artery disease, recent myocardial infarction, or implantable device/pacemaker [Aortic Stenosis] : no aortic stenosis [Atrial Fibrillation] : no atrial fibrillation [Coronary Artery Disease] : no coronary artery disease [Recent Myocardial Infarction] : no recent myocardial infarction [Implantable Device/Pacemaker] : no implantable device/pacemaker [No Pertinent Pulmonary History] : no history of asthma, COPD, sleep apnea, or smoking [Asthma] : no asthma [COPD] : no COPD [Sleep Apnea] : no sleep apnea [Smoker] : not a smoker [Family Member] : no family member with adverse anesthesia reaction/sudden death [Self] : no previous adverse anesthesia reaction [Chronic Anticoagulation] : no chronic anticoagulation [Chronic Kidney Disease] : no chronic kidney disease [Diabetes] : no diabetes [(Patient denies any chest pain, claudication, dyspnea on exertion, orthopnea, palpitations or syncope)] : Patient denies any chest pain, claudication, dyspnea on exertion, orthopnea, palpitations or syncope [FreeTextEntry1] : cataract [FreeTextEntry2] : 8/8/18 [FreeTextEntry3] : dr davila

## 2018-08-02 NOTE — PHYSICAL EXAM
[No Acute Distress] : no acute distress [Well Nourished] : well nourished [Well Developed] : well developed [Well-Appearing] : well-appearing [Normal Voice/Communication] : normal voice/communication [Normal Sclera/Conjunctiva] : normal sclera/conjunctiva [PERRL] : pupils equal round and reactive to light [EOMI] : extraocular movements intact [Normal Outer Ear/Nose] : the outer ears and nose were normal in appearance [Normal Oropharynx] : the oropharynx was normal [Normal Nasal Mucosa] : the nasal mucosa was normal [No JVD] : no jugular venous distention [Supple] : supple [No Lymphadenopathy] : no lymphadenopathy [Thyroid Normal, No Nodules] : the thyroid was normal and there were no nodules present [No Respiratory Distress] : no respiratory distress  [Clear to Auscultation] : lungs were clear to auscultation bilaterally [No Accessory Muscle Use] : no accessory muscle use [Normal Percussion] : the chest was normal to percussion [Normal Rate] : normal rate  [Regular Rhythm] : with a regular rhythm [Normal S1, S2] : normal S1 and S2 [No Murmur] : no murmur heard [No Carotid Bruits] : no carotid bruits [No Abdominal Bruit] : a ~M bruit was not heard ~T in the abdomen [No Varicosities] : no varicosities [Pedal Pulses Present] : the pedal pulses are present [No Edema] : there was no peripheral edema [No Extremity Clubbing/Cyanosis] : no extremity clubbing/cyanosis [No Palpable Aorta] : no palpable aorta [Declined Breast Exam] : declined breast exam  [Soft] : abdomen soft [Non Tender] : non-tender [Non-distended] : non-distended [No Masses] : no abdominal mass palpated [No HSM] : no HSM [Normal Bowel Sounds] : normal bowel sounds [No Hernias] : no hernias [Declined Rectal Exam] : declined rectal exam [Normal Supraclavicular Nodes] : no supraclavicular lymphadenopathy [Normal Axillary Nodes] : no axillary lymphadenopathy [Normal Posterior Cervical Nodes] : no posterior cervical lymphadenopathy [Normal Femoral Nodes] : no femoral lymphadenopathy [Normal Anterior Cervical Nodes] : no anterior cervical lymphadenopathy [Normal Inguinal Nodes] : no inguinal lymphadenopathy [No CVA Tenderness] : no CVA  tenderness [No Spinal Tenderness] : no spinal tenderness [Kyphosis] : no kyphosis [Scoliosis] : no scoliosis [No Joint Swelling] : no joint swelling [Grossly Normal Strength/Tone] : grossly normal strength/tone [No Rash] : no rash [No Skin Lesions] : no skin lesions [Acne] : no acne [Normal Gait] : normal gait [Coordination Grossly Intact] : coordination grossly intact [No Focal Deficits] : no focal deficits [Deep Tendon Reflexes (DTR)] : deep tendon reflexes were 2+ and symmetric [Speech Grossly Normal] : speech grossly normal [Memory Grossly Normal] : memory grossly normal [Normal Affect] : the affect was normal [Alert and Oriented x3] : oriented to person, place, and time [Normal Mood] : the mood was normal [Normal Insight/Judgement] : insight and judgment were intact

## 2018-08-02 NOTE — REVIEW OF SYSTEMS
[Fever] : no fever [Chills] : no chills [Fatigue] : no fatigue [Hot Flashes] : no hot flashes [Night Sweats] : no night sweats [Recent Change In Weight] : ~T no recent weight change [Discharge] : no discharge [Pain] : no pain [Redness] : no redness [Dryness] : no dryness [Vision Problems] : no vision problems [Itching] : no itching [Earache] : no earache [Hearing Loss] : hearing loss [Nosebleeds] : no nosebleeds [Postnasal Drip] : postnasal drip [Nasal Discharge] : no nasal discharge [Sore Throat] : no sore throat [Hoarseness] : no hoarseness [Chest Pain] : no chest pain [Palpitations] : no palpitations [Claudication] : no  leg claudication [Lower Ext Edema] : no lower extremity edema [Orthopena] : no orthopnea [Paroysmal Nocturnal Dyspnea] : no paroysmal nocturnal dyspnea [Shortness Of Breath] : no shortness of breath [Wheezing] : no wheezing [Cough] : no cough [Dyspnea on Exertion] : not dyspnea on exertion [Abdominal Pain] : no abdominal pain [Nausea] : no nausea [Constipation] : no constipation [Diarrhea] : no diarrhea [Vomiting] : no vomiting [Heartburn] : no heartburn [Melena] : no melena [Dysuria] : no dysuria [Incontinence] : no incontinence [Hesitancy] : no hesitancy [Nocturia] : no nocturia [Hematuria] : no hematuria [Frequency] : no frequency [Impotence] : impotence [Poor Libido] : libido not poor [Joint Pain] : joint pain [Joint Stiffness] : no joint stiffness [Muscle Pain] : no muscle pain [Muscle Weakness] : no muscle weakness [Back Pain] : no back pain [Joint Swelling] : no joint swelling [Itching] : no itching [Mole Changes] : no mole changes [Nail Changes] : no nail changes [Hair Changes] : no hair changes [Skin Rash] : no skin rash [Headache] : no headache [Dizziness] : no dizziness [Fainting] : no fainting [Confusion] : no confusion [Unsteady Walk] : no ataxia [Memory Loss] : no memory loss [Suicidal] : not suicidal [Insomnia] : no insomnia [Anxiety] : no anxiety [Depression] : no depression [Easy Bleeding] : no easy bleeding [Easy Bruising] : no easy bruising [Swollen Glands] : no swollen glands [Negative] : Heme/Lymph [FreeTextEntry4] : lightheadedness [FreeTextEntry9] : hip....leg cramps....

## 2018-08-02 NOTE — ASSESSMENT
[Patient Optimized for Surgery] : Patient optimized for surgery [No Further Testing Recommended] : no further testing recommended [FreeTextEntry4] : Physical exam shows a well developed and in no acute distress blood pressure 118/74 pulse of 78 and regular respiratory rate 14 5 foot 9 inches 163 pounds BMI 24.07 HEENT is unremarkable chest was clear cardiovascular was regular with a 1/6 systolic murmur abdomen soft and nontender extremities showed no clubbing cyanosis or edema her neurologic exam was nonfocal.................... patient is medically cleared for planned cataract surgery

## 2018-08-05 RX ORDER — SODIUM CHLORIDE 9 MG/ML
1000 INJECTION, SOLUTION INTRAVENOUS
Qty: 0 | Refills: 0 | Status: DISCONTINUED | OUTPATIENT
Start: 2018-08-08 | End: 2018-08-23

## 2018-08-05 RX ORDER — ACETAMINOPHEN 500 MG
650 TABLET ORAL EVERY 6 HOURS
Qty: 0 | Refills: 0 | Status: DISCONTINUED | OUTPATIENT
Start: 2018-08-08 | End: 2018-08-23

## 2018-08-06 NOTE — ASU PATIENT PROFILE, ADULT - PMH
Bladder polyps    BPH (benign prostatic hyperplasia)    Lafe's disease    HTN (hypertension)    Osteoarthritis    Thrombocytopenia

## 2018-08-07 ENCOUNTER — TRANSCRIPTION ENCOUNTER (OUTPATIENT)
Age: 83
End: 2018-08-07

## 2018-08-08 ENCOUNTER — OUTPATIENT (OUTPATIENT)
Dept: OUTPATIENT SERVICES | Facility: HOSPITAL | Age: 83
LOS: 1 days | End: 2018-08-08
Payer: MEDICARE

## 2018-08-08 VITALS
WEIGHT: 164.91 LBS | SYSTOLIC BLOOD PRESSURE: 134 MMHG | TEMPERATURE: 98 F | DIASTOLIC BLOOD PRESSURE: 70 MMHG | RESPIRATION RATE: 16 BRPM | HEART RATE: 76 BPM | HEIGHT: 71 IN | OXYGEN SATURATION: 97 %

## 2018-08-08 VITALS
DIASTOLIC BLOOD PRESSURE: 65 MMHG | TEMPERATURE: 99 F | HEART RATE: 75 BPM | OXYGEN SATURATION: 97 % | SYSTOLIC BLOOD PRESSURE: 119 MMHG | RESPIRATION RATE: 16 BRPM

## 2018-08-08 DIAGNOSIS — Z98.890 OTHER SPECIFIED POSTPROCEDURAL STATES: Chronic | ICD-10-CM

## 2018-08-08 DIAGNOSIS — H25.11 AGE-RELATED NUCLEAR CATARACT, RIGHT EYE: ICD-10-CM

## 2018-08-08 PROCEDURE — C1780: CPT

## 2018-08-08 PROCEDURE — 66984 XCAPSL CTRC RMVL W/O ECP: CPT | Mod: RT

## 2018-08-08 RX ORDER — KETOROLAC TROMETHAMINE 0.5 %
1 DROPS OPHTHALMIC (EYE)
Qty: 0 | Refills: 0 | Status: COMPLETED | OUTPATIENT
Start: 2018-08-08 | End: 2018-08-08

## 2018-08-08 RX ORDER — TROPICAMIDE 1 %
1 DROPS OPHTHALMIC (EYE)
Qty: 0 | Refills: 0 | Status: COMPLETED | OUTPATIENT
Start: 2018-08-08 | End: 2018-08-08

## 2018-08-08 RX ORDER — CYCLOPENTOLATE HYDROCHLORIDE 10 MG/ML
1 SOLUTION/ DROPS OPHTHALMIC
Qty: 0 | Refills: 0 | Status: COMPLETED | OUTPATIENT
Start: 2018-08-08 | End: 2018-08-08

## 2018-08-08 RX ORDER — PHENYLEPHRINE HCL 2.5 %
1 DROPS OPHTHALMIC (EYE)
Qty: 0 | Refills: 0 | Status: COMPLETED | OUTPATIENT
Start: 2018-08-08 | End: 2018-08-08

## 2018-08-08 RX ADMIN — Medication 1 DROP(S): at 07:42

## 2018-08-08 RX ADMIN — Medication 1 DROP(S): at 07:54

## 2018-08-08 RX ADMIN — CYCLOPENTOLATE HYDROCHLORIDE 1 DROP(S): 10 SOLUTION/ DROPS OPHTHALMIC at 07:55

## 2018-08-08 RX ADMIN — CYCLOPENTOLATE HYDROCHLORIDE 1 DROP(S): 10 SOLUTION/ DROPS OPHTHALMIC at 07:41

## 2018-08-08 RX ADMIN — SODIUM CHLORIDE 40 MILLILITER(S): 9 INJECTION, SOLUTION INTRAVENOUS at 07:40

## 2018-08-08 RX ADMIN — Medication 1 DROP(S): at 07:49

## 2018-08-08 RX ADMIN — CYCLOPENTOLATE HYDROCHLORIDE 1 DROP(S): 10 SOLUTION/ DROPS OPHTHALMIC at 07:49

## 2018-08-08 RX ADMIN — Medication 1 DROP(S): at 07:55

## 2018-08-08 NOTE — ASU DISCHARGE PLAN (ADULT/PEDIATRIC). - NOTIFY
Unable to Urinate/Swelling that continues/Persistent Nausea and Vomiting/Fever greater than 101/Inability to Tolerate Liquids or Foods/Bleeding that does not stop/Pain not relieved by Medications/Increased Irritability or Sluggishness

## 2018-08-08 NOTE — ASU DISCHARGE PLAN (ADULT/PEDIATRIC). - NURSING INSTRUCTIONS
All discharge,safety,follow up care to MD. Importance of following all discharge information provided.

## 2018-08-08 NOTE — BRIEF OPERATIVE NOTE - PROCEDURE
<<-----Click on this checkbox to enter Procedure Phacoemulsification of cataract of right eye with implantation of foldable intraocular lens  08/08/2018    Active  AGIRARDI

## 2018-08-10 ENCOUNTER — RESULT REVIEW (OUTPATIENT)
Age: 83
End: 2018-08-10

## 2018-08-10 ENCOUNTER — APPOINTMENT (OUTPATIENT)
Dept: INFUSION THERAPY | Facility: HOSPITAL | Age: 83
End: 2018-08-10

## 2018-08-10 ENCOUNTER — LABORATORY RESULT (OUTPATIENT)
Age: 83
End: 2018-08-10

## 2018-08-10 PROBLEM — L11.1 TRANSIENT ACANTHOLYTIC DERMATOSIS [GROVER]: Chronic | Status: ACTIVE | Noted: 2018-08-06

## 2018-08-10 PROBLEM — M19.90 UNSPECIFIED OSTEOARTHRITIS, UNSPECIFIED SITE: Chronic | Status: ACTIVE | Noted: 2018-08-06

## 2018-08-10 PROBLEM — N40.0 BENIGN PROSTATIC HYPERPLASIA WITHOUT LOWER URINARY TRACT SYMPTOMS: Chronic | Status: ACTIVE | Noted: 2018-08-06

## 2018-08-10 PROBLEM — D41.4 NEOPLASM OF UNCERTAIN BEHAVIOR OF BLADDER: Chronic | Status: ACTIVE | Noted: 2018-08-06

## 2018-08-10 PROBLEM — I10 ESSENTIAL (PRIMARY) HYPERTENSION: Chronic | Status: ACTIVE | Noted: 2018-08-06

## 2018-08-10 PROBLEM — D69.6 THROMBOCYTOPENIA, UNSPECIFIED: Chronic | Status: ACTIVE | Noted: 2018-08-06

## 2018-08-10 PROCEDURE — 96372 THER/PROPH/DIAG INJ SC/IM: CPT

## 2018-08-10 RX ORDER — ROMIPLOSTIM 250 UG/.5ML
225 INJECTION, POWDER, LYOPHILIZED, FOR SOLUTION SUBCUTANEOUS ONCE
Qty: 0 | Refills: 0 | Status: COMPLETED | OUTPATIENT
Start: 2018-08-10 | End: 2018-08-10

## 2018-08-10 RX ADMIN — ROMIPLOSTIM 225 MICROGRAM(S): 250 INJECTION, POWDER, LYOPHILIZED, FOR SOLUTION SUBCUTANEOUS at 12:37

## 2018-08-13 RX ORDER — ROMIPLOSTIM 250 UG/.5ML
225 INJECTION, POWDER, LYOPHILIZED, FOR SOLUTION SUBCUTANEOUS ONCE
Qty: 0 | Refills: 0 | Status: DISCONTINUED | OUTPATIENT
Start: 2018-08-13 | End: 2018-08-14

## 2018-08-20 ENCOUNTER — OUTPATIENT (OUTPATIENT)
Dept: OUTPATIENT SERVICES | Facility: HOSPITAL | Age: 83
LOS: 1 days | End: 2018-08-20
Payer: MEDICARE

## 2018-08-20 ENCOUNTER — LABORATORY RESULT (OUTPATIENT)
Age: 83
End: 2018-08-20

## 2018-08-20 ENCOUNTER — APPOINTMENT (OUTPATIENT)
Dept: INFUSION THERAPY | Facility: HOSPITAL | Age: 83
End: 2018-08-20

## 2018-08-20 DIAGNOSIS — Z98.890 OTHER SPECIFIED POSTPROCEDURAL STATES: Chronic | ICD-10-CM

## 2018-08-20 DIAGNOSIS — D69.6 THROMBOCYTOPENIA, UNSPECIFIED: ICD-10-CM

## 2018-08-20 RX ORDER — ROMIPLOSTIM 250 UG/.5ML
150 INJECTION, POWDER, LYOPHILIZED, FOR SOLUTION SUBCUTANEOUS ONCE
Qty: 0 | Refills: 0 | Status: COMPLETED | OUTPATIENT
Start: 2018-08-20 | End: 2018-08-20

## 2018-08-20 RX ADMIN — ROMIPLOSTIM 150 MICROGRAM(S): 250 INJECTION, POWDER, LYOPHILIZED, FOR SOLUTION SUBCUTANEOUS at 10:45

## 2018-08-30 ENCOUNTER — APPOINTMENT (OUTPATIENT)
Dept: INFUSION THERAPY | Facility: HOSPITAL | Age: 83
End: 2018-08-30

## 2018-08-30 ENCOUNTER — APPOINTMENT (OUTPATIENT)
Dept: HEMATOLOGY ONCOLOGY | Facility: CLINIC | Age: 83
End: 2018-08-30
Payer: MEDICARE

## 2018-08-30 ENCOUNTER — LABORATORY RESULT (OUTPATIENT)
Age: 83
End: 2018-08-30

## 2018-08-30 PROCEDURE — 99213 OFFICE O/P EST LOW 20 MIN: CPT

## 2018-08-30 RX ORDER — ROMIPLOSTIM 250 UG/.5ML
150 INJECTION, POWDER, LYOPHILIZED, FOR SOLUTION SUBCUTANEOUS ONCE
Qty: 0 | Refills: 0 | Status: COMPLETED | OUTPATIENT
Start: 2018-08-30 | End: 2018-08-30

## 2018-08-30 RX ADMIN — ROMIPLOSTIM 150 MICROGRAM(S): 250 INJECTION, POWDER, LYOPHILIZED, FOR SOLUTION SUBCUTANEOUS at 12:28

## 2018-09-13 ENCOUNTER — LABORATORY RESULT (OUTPATIENT)
Age: 83
End: 2018-09-13

## 2018-09-13 ENCOUNTER — APPOINTMENT (OUTPATIENT)
Dept: INFUSION THERAPY | Facility: HOSPITAL | Age: 83
End: 2018-09-13

## 2018-09-13 PROCEDURE — 96372 THER/PROPH/DIAG INJ SC/IM: CPT

## 2018-09-13 RX ORDER — ROMIPLOSTIM 250 UG/.5ML
150 INJECTION, POWDER, LYOPHILIZED, FOR SOLUTION SUBCUTANEOUS ONCE
Qty: 0 | Refills: 0 | Status: COMPLETED | OUTPATIENT
Start: 2018-09-13 | End: 2018-09-13

## 2018-09-13 RX ADMIN — ROMIPLOSTIM 150 MICROGRAM(S): 250 INJECTION, POWDER, LYOPHILIZED, FOR SOLUTION SUBCUTANEOUS at 11:35

## 2018-09-20 ENCOUNTER — LABORATORY RESULT (OUTPATIENT)
Age: 83
End: 2018-09-20

## 2018-09-20 ENCOUNTER — APPOINTMENT (OUTPATIENT)
Dept: INFUSION THERAPY | Facility: HOSPITAL | Age: 83
End: 2018-09-20

## 2018-09-20 ENCOUNTER — OUTPATIENT (OUTPATIENT)
Dept: OUTPATIENT SERVICES | Facility: HOSPITAL | Age: 83
LOS: 1 days | End: 2018-09-20
Payer: MEDICARE

## 2018-09-20 DIAGNOSIS — D69.6 THROMBOCYTOPENIA, UNSPECIFIED: ICD-10-CM

## 2018-09-20 DIAGNOSIS — Z98.890 OTHER SPECIFIED POSTPROCEDURAL STATES: Chronic | ICD-10-CM

## 2018-09-20 RX ORDER — ROMIPLOSTIM 250 UG/.5ML
150 INJECTION, POWDER, LYOPHILIZED, FOR SOLUTION SUBCUTANEOUS ONCE
Qty: 0 | Refills: 0 | Status: COMPLETED | OUTPATIENT
Start: 2018-09-20 | End: 2018-09-20

## 2018-09-20 RX ADMIN — ROMIPLOSTIM 150 MICROGRAM(S): 250 INJECTION, POWDER, LYOPHILIZED, FOR SOLUTION SUBCUTANEOUS at 09:55

## 2018-09-26 VITALS — WEIGHT: 162.92 LBS | HEIGHT: 69 IN

## 2018-09-27 ENCOUNTER — APPOINTMENT (OUTPATIENT)
Dept: INFUSION THERAPY | Facility: HOSPITAL | Age: 83
End: 2018-09-27

## 2018-09-27 ENCOUNTER — APPOINTMENT (OUTPATIENT)
Dept: INTERNAL MEDICINE | Facility: CLINIC | Age: 83
End: 2018-09-27
Payer: MEDICARE

## 2018-09-27 VITALS
BODY MASS INDEX: 24.29 KG/M2 | WEIGHT: 164 LBS | SYSTOLIC BLOOD PRESSURE: 122 MMHG | DIASTOLIC BLOOD PRESSURE: 78 MMHG | HEIGHT: 69 IN | HEART RATE: 80 BPM | RESPIRATION RATE: 14 BRPM

## 2018-09-27 PROCEDURE — 69210 REMOVE IMPACTED EAR WAX UNI: CPT

## 2018-09-27 PROCEDURE — 99215 OFFICE O/P EST HI 40 MIN: CPT | Mod: 25

## 2018-09-27 RX ORDER — ROMIPLOSTIM 250 UG/.5ML
100 INJECTION, POWDER, LYOPHILIZED, FOR SOLUTION SUBCUTANEOUS ONCE
Qty: 0 | Refills: 0 | Status: DISCONTINUED | OUTPATIENT
Start: 2018-09-27 | End: 2018-10-04

## 2018-09-27 NOTE — HISTORY OF PRESENT ILLNESS
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 He denies temperature chills sweats or myalgias he's had no headache sinus congestion sore throat cough wheezing chest pain pleurisy shortness of breath exertional dyspnea lightheadedness palpitations dizziness vertigo or syncope he denies abdominal pain nausea vomiting diarrhea constipation rectal bleeding or black stools except for some pain in his hips and his lower back he denies any significant rheumatologic musculoskeletal pains appetite has been good his weight has been stable

## 2018-09-27 NOTE — PHYSICAL EXAM
[No Acute Distress] : no acute distress [Well Nourished] : well nourished [Well Developed] : well developed [Well-Appearing] : well-appearing [Normal Sclera/Conjunctiva] : normal sclera/conjunctiva [PERRL] : pupils equal round and reactive to light [EOMI] : extraocular movements intact [Normal Outer Ear/Nose] : the outer ears and nose were normal in appearance [Normal Oropharynx] : the oropharynx was normal [No JVD] : no jugular venous distention [Supple] : supple [No Lymphadenopathy] : no lymphadenopathy [Thyroid Normal, No Nodules] : the thyroid was normal and there were no nodules present [No Respiratory Distress] : no respiratory distress  [Clear to Auscultation] : lungs were clear to auscultation bilaterally [No Accessory Muscle Use] : no accessory muscle use [Normal Rate] : normal rate  [Regular Rhythm] : with a regular rhythm [Normal S1, S2] : normal S1 and S2 [No Murmur] : no murmur heard [No Carotid Bruits] : no carotid bruits [No Abdominal Bruit] : a ~M bruit was not heard ~T in the abdomen [No Varicosities] : no varicosities [Pedal Pulses Present] : the pedal pulses are present [No Edema] : there was no peripheral edema [No Extremity Clubbing/Cyanosis] : no extremity clubbing/cyanosis [No Palpable Aorta] : no palpable aorta [Soft] : abdomen soft [Non Tender] : non-tender [Non-distended] : non-distended [No Masses] : no abdominal mass palpated [No HSM] : no HSM [Normal Bowel Sounds] : normal bowel sounds [Normal Posterior Cervical Nodes] : no posterior cervical lymphadenopathy [Normal Anterior Cervical Nodes] : no anterior cervical lymphadenopathy [No CVA Tenderness] : no CVA  tenderness [No Spinal Tenderness] : no spinal tenderness [No Joint Swelling] : no joint swelling [Grossly Normal Strength/Tone] : grossly normal strength/tone [No Rash] : no rash [Normal Gait] : normal gait [Coordination Grossly Intact] : coordination grossly intact [No Focal Deficits] : no focal deficits [Deep Tendon Reflexes (DTR)] : deep tendon reflexes were 2+ and symmetric [Normal Affect] : the affect was normal [Normal Insight/Judgement] : insight and judgment were intact [Normal Voice/Communication] : normal voice/communication [Normal Nasal Mucosa] : the nasal mucosa was normal [Declined Breast Exam] : declined breast exam  [No Hernias] : no hernias [Declined Rectal Exam] : declined rectal exam [Normal Supraclavicular Nodes] : no supraclavicular lymphadenopathy [Normal Axillary Nodes] : no axillary lymphadenopathy [Normal Femoral Nodes] : no femoral lymphadenopathy [Normal Inguinal Nodes] : no inguinal lymphadenopathy [No Skin Lesions] : no skin lesions [Speech Grossly Normal] : speech grossly normal [Memory Grossly Normal] : memory grossly normal [Alert and Oriented x3] : oriented to person, place, and time [Normal Mood] : the mood was normal [Kyphosis] : no kyphosis [Scoliosis] : no scoliosis [Acne] : no acne [de-identified] : ear wax

## 2018-09-27 NOTE — REVIEW OF SYSTEMS
[Hearing Loss] : hearing loss [Nocturia] : nocturia [Impotence] : impotence [Poor Libido] : poor libido [Joint Stiffness] : joint stiffness [Back Pain] : back pain [Mole Changes] : mole changes [Fever] : no fever [Chills] : no chills [Fatigue] : no fatigue [Hot Flashes] : no hot flashes [Night Sweats] : no night sweats [Recent Change In Weight] : ~T no recent weight change [Discharge] : no discharge [Pain] : no pain [Redness] : no redness [Dryness] : no dryness [Vision Problems] : no vision problems [Itching] : no itching [Earache] : no earache [Nosebleeds] : no nosebleeds [Postnasal Drip] : no postnasal drip [Nasal Discharge] : no nasal discharge [Sore Throat] : no sore throat [Hoarseness] : no hoarseness [Chest Pain] : no chest pain [Palpitations] : no palpitations [Claudication] : no  leg claudication [Lower Ext Edema] : no lower extremity edema [Orthopena] : no orthopnea [Paroysmal Nocturnal Dyspnea] : no paroysmal nocturnal dyspnea [Shortness Of Breath] : no shortness of breath [Wheezing] : no wheezing [Cough] : no cough [Dyspnea on Exertion] : not dyspnea on exertion [Abdominal Pain] : no abdominal pain [Nausea] : no nausea [Constipation] : no constipation [Diarrhea] : no diarrhea [Vomiting] : no vomiting [Heartburn] : no heartburn [Melena] : no melena [Dysuria] : no dysuria [Incontinence] : no incontinence [Hesitancy] : no hesitancy [Hematuria] : no hematuria [Frequency] : no frequency [Joint Pain] : no joint pain [Muscle Pain] : no muscle pain [Muscle Weakness] : no muscle weakness [Joint Swelling] : no joint swelling [Nail Changes] : no nail changes [Hair Changes] : no hair changes [Skin Rash] : no skin rash [Headache] : no headache [Dizziness] : no dizziness [Fainting] : no fainting [Confusion] : no confusion [Unsteady Walk] : no ataxia [Memory Loss] : no memory loss [Suicidal] : not suicidal [Insomnia] : no insomnia [Anxiety] : no anxiety [Depression] : no depression [Easy Bleeding] : no easy bleeding [Easy Bruising] : no easy bruising [FreeTextEntry4] : wax [de-identified] : right ear

## 2018-09-27 NOTE — ASSESSMENT
[FreeTextEntry1] : Physical exam shows a well-developed man in no acute distress blood pressure 122/78 height 5 foot 9 inches weight 164 pounds BMI 24.2 to a rate of 80 respiratory rate of 16 HEENT showed both ear canals are occluded by wax flush irrigation and instrumentation the canals were cleared of wax chest was clear to auscultation and percussion cardiovascular exam showed normal S1 and S2 there was a 2/6 systolic murmur abdomen soft extremities showed no clubbing cyanosis or edema neurologic exam was nonfocal. 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

## 2018-09-27 NOTE — HEALTH RISK ASSESSMENT
[No falls in past year] : Patient reported no falls in the past year [0] : 2) Feeling down, depressed, or hopeless: Not at all (0) [[id number]] : 0

## 2018-10-03 RX ORDER — ROMIPLOSTIM 250 UG/.5ML
150 INJECTION, POWDER, LYOPHILIZED, FOR SOLUTION SUBCUTANEOUS ONCE
Qty: 0 | Refills: 0 | Status: DISCONTINUED | OUTPATIENT
Start: 2018-10-04 | End: 2018-10-04

## 2018-10-04 ENCOUNTER — LABORATORY RESULT (OUTPATIENT)
Age: 83
End: 2018-10-04

## 2018-10-04 ENCOUNTER — APPOINTMENT (OUTPATIENT)
Dept: INFUSION THERAPY | Facility: HOSPITAL | Age: 83
End: 2018-10-04

## 2018-10-05 ENCOUNTER — APPOINTMENT (OUTPATIENT)
Dept: INFUSION THERAPY | Facility: HOSPITAL | Age: 83
End: 2018-10-05

## 2018-10-05 RX ORDER — ROMIPLOSTIM 250 UG/.5ML
100 INJECTION, POWDER, LYOPHILIZED, FOR SOLUTION SUBCUTANEOUS ONCE
Qty: 0 | Refills: 0 | Status: COMPLETED | OUTPATIENT
Start: 2018-10-05 | End: 2018-10-05

## 2018-10-05 RX ADMIN — ROMIPLOSTIM 100 MICROGRAM(S): 250 INJECTION, POWDER, LYOPHILIZED, FOR SOLUTION SUBCUTANEOUS at 10:08

## 2018-10-11 ENCOUNTER — APPOINTMENT (OUTPATIENT)
Dept: INFUSION THERAPY | Facility: HOSPITAL | Age: 83
End: 2018-10-11

## 2018-10-11 ENCOUNTER — LABORATORY RESULT (OUTPATIENT)
Age: 83
End: 2018-10-11

## 2018-10-11 RX ORDER — ROMIPLOSTIM 250 UG/.5ML
100 INJECTION, POWDER, LYOPHILIZED, FOR SOLUTION SUBCUTANEOUS ONCE
Qty: 0 | Refills: 0 | Status: DISCONTINUED | OUTPATIENT
Start: 2018-10-11 | End: 2018-10-11

## 2018-10-18 ENCOUNTER — LABORATORY RESULT (OUTPATIENT)
Age: 83
End: 2018-10-18

## 2018-10-18 ENCOUNTER — APPOINTMENT (OUTPATIENT)
Dept: INFUSION THERAPY | Facility: HOSPITAL | Age: 83
End: 2018-10-18

## 2018-10-18 PROCEDURE — 96372 THER/PROPH/DIAG INJ SC/IM: CPT

## 2018-10-18 RX ORDER — ROMIPLOSTIM 250 UG/.5ML
100 INJECTION, POWDER, LYOPHILIZED, FOR SOLUTION SUBCUTANEOUS ONCE
Qty: 0 | Refills: 0 | Status: COMPLETED | OUTPATIENT
Start: 2018-10-18 | End: 2018-10-18

## 2018-10-18 RX ADMIN — ROMIPLOSTIM 100 MICROGRAM(S): 250 INJECTION, POWDER, LYOPHILIZED, FOR SOLUTION SUBCUTANEOUS at 10:20

## 2018-10-25 ENCOUNTER — OUTPATIENT (OUTPATIENT)
Dept: OUTPATIENT SERVICES | Facility: HOSPITAL | Age: 83
LOS: 1 days | End: 2018-10-25
Payer: MEDICARE

## 2018-10-25 ENCOUNTER — APPOINTMENT (OUTPATIENT)
Dept: INFUSION THERAPY | Facility: HOSPITAL | Age: 83
End: 2018-10-25

## 2018-10-25 DIAGNOSIS — Z98.890 OTHER SPECIFIED POSTPROCEDURAL STATES: Chronic | ICD-10-CM

## 2018-10-25 DIAGNOSIS — D69.6 THROMBOCYTOPENIA, UNSPECIFIED: ICD-10-CM

## 2018-10-25 RX ORDER — ROMIPLOSTIM 250 UG/.5ML
100 INJECTION, POWDER, LYOPHILIZED, FOR SOLUTION SUBCUTANEOUS ONCE
Qty: 0 | Refills: 0 | Status: COMPLETED | OUTPATIENT
Start: 2018-10-25 | End: 2018-10-25

## 2018-10-25 RX ADMIN — ROMIPLOSTIM 100 MICROGRAM(S): 250 INJECTION, POWDER, LYOPHILIZED, FOR SOLUTION SUBCUTANEOUS at 11:11

## 2018-11-06 ENCOUNTER — APPOINTMENT (OUTPATIENT)
Dept: INFUSION THERAPY | Facility: HOSPITAL | Age: 83
End: 2018-11-06

## 2018-11-06 ENCOUNTER — LABORATORY RESULT (OUTPATIENT)
Age: 83
End: 2018-11-06

## 2018-11-06 ENCOUNTER — APPOINTMENT (OUTPATIENT)
Dept: HEMATOLOGY ONCOLOGY | Facility: CLINIC | Age: 83
End: 2018-11-06
Payer: MEDICARE

## 2018-11-06 VITALS
RESPIRATION RATE: 14 BRPM | HEART RATE: 84 BPM | BODY MASS INDEX: 23.92 KG/M2 | DIASTOLIC BLOOD PRESSURE: 62 MMHG | TEMPERATURE: 98.1 F | SYSTOLIC BLOOD PRESSURE: 124 MMHG | WEIGHT: 162 LBS

## 2018-11-06 PROCEDURE — 96372 THER/PROPH/DIAG INJ SC/IM: CPT

## 2018-11-06 PROCEDURE — 99213 OFFICE O/P EST LOW 20 MIN: CPT

## 2018-11-06 RX ORDER — MOXIFLOXACIN OPHTHALMIC 5 MG/ML
0.5 SOLUTION/ DROPS OPHTHALMIC
Qty: 3 | Refills: 0 | Status: COMPLETED | COMMUNITY
Start: 2018-04-16 | End: 2018-11-06

## 2018-11-06 RX ORDER — DIFLUPREDNATE 0.5 MG/ML
0.05 EMULSION OPHTHALMIC
Qty: 5 | Refills: 0 | Status: COMPLETED | COMMUNITY
Start: 2018-04-16 | End: 2018-11-06

## 2018-11-06 RX ORDER — ROMIPLOSTIM 250 UG/.5ML
100 INJECTION, POWDER, LYOPHILIZED, FOR SOLUTION SUBCUTANEOUS ONCE
Qty: 0 | Refills: 0 | Status: COMPLETED | OUTPATIENT
Start: 2018-11-06 | End: 2018-11-06

## 2018-11-06 RX ADMIN — ROMIPLOSTIM 100 MICROGRAM(S): 250 INJECTION, POWDER, LYOPHILIZED, FOR SOLUTION SUBCUTANEOUS at 10:18

## 2018-11-06 NOTE — PHYSICAL EXAM
[Restricted in physically strenuous activity but ambulatory and able to carry out work of a light or sedentary nature] : Status 1- Restricted in physically strenuous activity but ambulatory and able to carry out work of a light or sedentary nature, e.g., light house work, office work [Normal] : grossly intact [de-identified] : only oneskin ulcerations anterior right shin remaining; rest of lesions on both shins - healed, some crusted.

## 2018-11-06 NOTE — RESULTS/DATA
[FreeTextEntry1] : June 28 , 2018: plt-134\par June 21, 2018: plt 123\par June 14, 2018: plt 493\par June 7, 2018: plt 679\par \par May 31, 2018: plt 220\par May 24, 2018: plt 20\par May 10, 2018: plt 476\par \par March 30, 2018:plt 136,000

## 2018-11-06 NOTE — REVIEW OF SYSTEMS
[Joint Pain] : joint pain [Skin Rash] : skin rash [Skin Wound] : skin wound [Negative] : Neurological [FreeTextEntry9] : hip pain, chronic [de-identified] : c/o skin dryness [de-identified] : chronic thrombocytopenia

## 2018-11-06 NOTE — HISTORY OF PRESENT ILLNESS
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 [FreeTextEntry1] : \par Rituximab: \par cycle # 1 - November 9, 2017\par cycle # 2 - November 17, 2017\par cycle # 3 - November 24, 2017\par cycle # 4 -November 29, 2017\par cycle # 5- December 8, 2017\par cycle # 6 - December 15, 2017\par cycle # 7 - December 22, 2017\par cycle # 8 - December 29, 2017\par cycle # 9 - January 5, 2018\par \par IVIG 70 grams: \par September 20 + 21, 2017\par October 9+ 10, 2017\par October 23, 2017\par \par Romiplostim ( N plate):\par March 21, 2017 - present [de-identified] : Patient last seen in the office in August 2018. Since then patient's platelet count stable at around 200,000, with no evidence of active bleeding. Subsequently Romiplostin dose was decreased to 100 mcg every 7-10 days. Patient remains active. Denies bleed, hospitalization, and his tendency to bruise has decreased. Continues to complain of hip pain and dry skin/ occasional skin ulcerations. Despite those, he remains active. Appetite and weight preserved.

## 2018-11-06 NOTE — ASSESSMENT
[FreeTextEntry1] : Mr. SWAIN's questions were answered to his satisfaction. He expressed his understanding and willingness to comply with the above recommendations. In view of office relocation, patient informs me of the potential switch of care to [facility], with Dr. Floyd.\par \par \par \par \par \par \par \par

## 2018-11-13 ENCOUNTER — APPOINTMENT (OUTPATIENT)
Dept: INFUSION THERAPY | Facility: HOSPITAL | Age: 83
End: 2018-11-13

## 2018-11-20 ENCOUNTER — APPOINTMENT (OUTPATIENT)
Dept: INFUSION THERAPY | Facility: HOSPITAL | Age: 83
End: 2018-11-20

## 2019-07-10 ENCOUNTER — APPOINTMENT (OUTPATIENT)
Dept: ORTHOPEDIC SURGERY | Facility: CLINIC | Age: 84
End: 2019-07-10
Payer: MEDICARE

## 2019-07-10 DIAGNOSIS — M16.11 UNILATERAL PRIMARY OSTEOARTHRITIS, RIGHT HIP: ICD-10-CM

## 2019-07-10 PROCEDURE — 99213 OFFICE O/P EST LOW 20 MIN: CPT

## 2019-07-10 PROCEDURE — 73502 X-RAY EXAM HIP UNI 2-3 VIEWS: CPT | Mod: LT

## 2019-07-10 NOTE — HISTORY OF PRESENT ILLNESS
[de-identified] : 88 year old male presents with chronic intermittent left hip pain. He has pain weightbearing and the pain is affecting the patient's quality of life and compromising daily normal activity. Cortisone injection summer 2018 provided minimal relief of pain for a short duration. Medical history thrombocytopenia

## 2019-07-10 NOTE — ADDENDUM
[FreeTextEntry1] : I, Juan A Eagle, acted solely as a scribe for Dr. Omer Gill on 07/10/2019  .\par  \par All medical record entries made by the scribe were at my, Dr. Omer Gill, direction and personally dictated by me on 07/10/2019. I have reviewed the chart and agree that the record accurately reflects my personal performance of the history, physical exam, assessment and plan. I have also personally directed, reviewed, and agreed with the chart.\par

## 2019-07-10 NOTE — DISCUSSION/SUMMARY
[de-identified] : The patient presents with left hip pain and OA. We discussed the nature of the condition and treatment options. The patient is a candidate for surgery based on imaging and quality of life. We discussed risks, benefits and alternatives to surgery.The patient will proceed with surgery. Preoperative hematology clearance

## 2019-07-10 NOTE — PHYSICAL EXAM
[de-identified] : Well-nourished, in no acute distress\par Alert and oriented to time, place and person\par Skin: no lesions discoloration\par Respirations: unlabored\par Cardiac: no leg swelling\par Lymphatic: no groin adenopathy\par left hip: passive range of motion painful and restricted [de-identified] : AP Pelvis and AP/Lateral Xrays of the left hip taken in the office today demonstrates left greater than right hip degenerative narrowing, subchondral sclerosis, marginal osteophytes.

## 2019-07-15 ENCOUNTER — TRANSCRIPTION ENCOUNTER (OUTPATIENT)
Age: 84
End: 2019-07-15

## 2019-07-19 ENCOUNTER — APPOINTMENT (OUTPATIENT)
Dept: INTERNAL MEDICINE | Facility: CLINIC | Age: 84
End: 2019-07-19
Payer: MEDICARE

## 2019-07-19 VITALS
TEMPERATURE: 98.2 F | SYSTOLIC BLOOD PRESSURE: 124 MMHG | HEART RATE: 76 BPM | DIASTOLIC BLOOD PRESSURE: 78 MMHG | RESPIRATION RATE: 15 BRPM

## 2019-07-19 VITALS — WEIGHT: 165 LBS | HEIGHT: 70 IN | BODY MASS INDEX: 23.62 KG/M2

## 2019-07-19 PROCEDURE — 99215 OFFICE O/P EST HI 40 MIN: CPT

## 2019-07-19 RX ORDER — ROMIPLOSTIM 500 UG/ML
500 INJECTION, POWDER, LYOPHILIZED, FOR SOLUTION SUBCUTANEOUS
Refills: 0 | Status: DISCONTINUED | COMMUNITY
End: 2019-07-19

## 2019-07-19 NOTE — ASSESSMENT
[Patient Optimized for Surgery] : Patient optimized for surgery [Cardiology consultation] : Cardiology consultation [Continue medications as is] : Continue current medications [As per surgery] : as per surgery [FreeTextEntry4] : Physical exam shows a well-developed man in no acute distress blood pressure 124/78 height of 5 feet 10 inches weight 165 pounds afebrile at 98.2°F orally heart rate is 76 respiratory rate of 15 HEENT was unremarkable chest was clear cardiovascular was regular with no extra heart sounds or murmurs abdomen was soft and nontender extremities showed no clubbing cyanosis or edema neurologic exam was nonfocal..... EKG showed normal sinus rhythm with no acute ST-T wave changes/within normal limits blood tests preoperatively were all within normal limits platelet count was 152.... patient will be seeing her cardiologist on Monday or Tuesday the coming week for cardiac clearance............... he is cleared medically planned hip replacement

## 2019-07-19 NOTE — HISTORY OF PRESENT ILLNESS
[No Pertinent Cardiac History] : no history of aortic stenosis, atrial fibrillation, coronary artery disease, recent myocardial infarction, or implantable device/pacemaker [No Pertinent Pulmonary History] : no history of asthma, COPD, sleep apnea, or smoking [No Adverse Anesthesia Reaction] : no adverse anesthesia reaction in self or family member [(Patient denies any chest pain, claudication, dyspnea on exertion, orthopnea, palpitations or syncope)] : Patient denies any chest pain, claudication, dyspnea on exertion, orthopnea, palpitations or syncope [Aortic Stenosis] : no aortic stenosis [Atrial Fibrillation] : no atrial fibrillation [Coronary Artery Disease] : no coronary artery disease [Recent Myocardial Infarction] : no recent myocardial infarction [Implantable Device/Pacemaker] : no implantable device/pacemaker [Asthma] : no asthma [COPD] : no COPD [Sleep Apnea] : no sleep apnea [Smoker] : not a smoker [Family Member] : no family member with adverse anesthesia reaction/sudden death [Self] : no previous adverse anesthesia reaction [Chronic Anticoagulation] : no chronic anticoagulation [Chronic Kidney Disease] : no chronic kidney disease [Diabetes] : no diabetes [FreeTextEntry1] : hip replacement [FreeTextEntry2] : 07/26/19 [FreeTextEntry3] : dr bullock [FreeTextEntry4] : 89-year-old man comes to the office for medical clearance for planned hip replacement by Dr. SMITH on July 26, 2019 patient's past medical history is significant for hypertension Ruslan's disease BPH and thrombocytopenia for which she has been successfully treated with promacta by Dr Floyd. Patient denies temperature chills sweats or myalgias he does have moderate to severe discomfort in his hip he denies headache sinus congestion sore throat cough wheezing pleurisy chest pain shortness of breath exertional dyspnea lightheadedness palpitations dizziness vertigo or syncope he denies abdominal pain nausea vomiting diarrhea constipation bright red blood per rectum or black stools his appetite has been good his weight has been stable besides his hip he denies musculoskeletal pains he has had no recent skin rashes and denies dysuria or gross hematuria he does get up at night to urinate one to 2 times

## 2019-07-19 NOTE — REVIEW OF SYSTEMS
[Nocturia] : nocturia [Impotence] : impotence [Poor Libido] : poor libido [Joint Stiffness] : joint stiffness [Back Pain] : back pain [Mole Changes] : mole changes [Fever] : no fever [Chills] : no chills [Fatigue] : no fatigue [Hot Flashes] : no hot flashes [Night Sweats] : no night sweats [Recent Change In Weight] : ~T no recent weight change [Discharge] : no discharge [Pain] : no pain [Redness] : no redness [Dryness] : no dryness [Vision Problems] : no vision problems [Itching] : no itching [Earache] : no earache [Hearing Loss] : no hearing loss [Nosebleeds] : no nosebleeds [Postnasal Drip] : no postnasal drip [Nasal Discharge] : no nasal discharge [Sore Throat] : no sore throat [Hoarseness] : no hoarseness [Chest Pain] : no chest pain [Palpitations] : no palpitations [Claudication] : no  leg claudication [Lower Ext Edema] : no lower extremity edema [Orthopena] : no orthopnea [Paroysmal Nocturnal Dyspnea] : no paroysmal nocturnal dyspnea [Shortness Of Breath] : no shortness of breath [Wheezing] : no wheezing [Cough] : no cough [Dyspnea on Exertion] : not dyspnea on exertion [Abdominal Pain] : no abdominal pain [Nausea] : no nausea [Constipation] : no constipation [Diarrhea] : no diarrhea [Vomiting] : no vomiting [Heartburn] : no heartburn [Melena] : no melena [Dysuria] : no dysuria [Incontinence] : no incontinence [Hesitancy] : no hesitancy [Hematuria] : no hematuria [Frequency] : no frequency [Joint Pain] : no joint pain [Muscle Pain] : no muscle pain [Muscle Weakness] : no muscle weakness [Joint Swelling] : no joint swelling [Itching] : no itching [Nail Changes] : no nail changes [Hair Changes] : no hair changes [Skin Rash] : no skin rash [Headache] : no headache [Dizziness] : no dizziness [Fainting] : no fainting [Confusion] : no confusion [Unsteady Walk] : no ataxia [Memory Loss] : no memory loss [Suicidal] : not suicidal [Insomnia] : no insomnia [Anxiety] : no anxiety [Depression] : no depression [Easy Bleeding] : no easy bleeding [Easy Bruising] : no easy bruising [de-identified] : right ear

## 2019-07-19 NOTE — RESULTS/DATA
[] : results reviewed [de-identified] : nl  platelets [id number] [de-identified] : nl [de-identified] : nl

## 2019-07-19 NOTE — PHYSICAL EXAM
[No Acute Distress] : no acute distress [Well Nourished] : well nourished [Well Developed] : well developed [Well-Appearing] : well-appearing [Normal Voice/Communication] : normal voice/communication [Normal Sclera/Conjunctiva] : normal sclera/conjunctiva [PERRL] : pupils equal round and reactive to light [EOMI] : extraocular movements intact [Normal Outer Ear/Nose] : the outer ears and nose were normal in appearance [Normal Oropharynx] : the oropharynx was normal [Normal TMs] : both tympanic membranes were normal [Normal Nasal Mucosa] : the nasal mucosa was normal [No JVD] : no jugular venous distention [No Lymphadenopathy] : no lymphadenopathy [Supple] : supple [Thyroid Normal, No Nodules] : the thyroid was normal and there were no nodules present [No Respiratory Distress] : no respiratory distress  [No Accessory Muscle Use] : no accessory muscle use [Clear to Auscultation] : lungs were clear to auscultation bilaterally [Normal Percussion] : the chest was normal to percussion [Normal Rate] : normal rate  [Regular Rhythm] : with a regular rhythm [Normal S1, S2] : normal S1 and S2 [No Murmur] : no murmur heard [No Carotid Bruits] : no carotid bruits [No Abdominal Bruit] : a ~M bruit was not heard ~T in the abdomen [No Varicosities] : no varicosities [Pedal Pulses Present] : the pedal pulses are present [No Edema] : there was no peripheral edema [No Palpable Aorta] : no palpable aorta [No Extremity Clubbing/Cyanosis] : no extremity clubbing/cyanosis [Declined Breast Exam] : declined breast exam  [Soft] : abdomen soft [Non Tender] : non-tender [Non-distended] : non-distended [No Masses] : no abdominal mass palpated [No HSM] : no HSM [Normal Bowel Sounds] : normal bowel sounds [No Hernias] : no hernias [Declined Rectal Exam] : declined rectal exam [Normal Supraclavicular Nodes] : no supraclavicular lymphadenopathy [Normal Axillary Nodes] : no axillary lymphadenopathy [Normal Posterior Cervical Nodes] : no posterior cervical lymphadenopathy [Normal Anterior Cervical Nodes] : no anterior cervical lymphadenopathy [Normal Inguinal Nodes] : no inguinal lymphadenopathy [Normal Femoral Nodes] : no femoral lymphadenopathy [No CVA Tenderness] : no CVA  tenderness [No Spinal Tenderness] : no spinal tenderness [No Joint Swelling] : no joint swelling [Grossly Normal Strength/Tone] : grossly normal strength/tone [No Rash] : no rash [No Skin Lesions] : no skin lesions [Coordination Grossly Intact] : coordination grossly intact [No Focal Deficits] : no focal deficits [Normal Gait] : normal gait [Deep Tendon Reflexes (DTR)] : deep tendon reflexes were 2+ and symmetric [Speech Grossly Normal] : speech grossly normal [Memory Grossly Normal] : memory grossly normal [Normal Affect] : the affect was normal [Alert and Oriented x3] : oriented to person, place, and time [Normal Mood] : the mood was normal [Normal Insight/Judgement] : insight and judgment were intact [Kyphosis] : no kyphosis [Scoliosis] : no scoliosis [Acne] : no acne

## 2019-07-24 ENCOUNTER — NON-APPOINTMENT (OUTPATIENT)
Age: 84
End: 2019-07-24

## 2019-07-24 ENCOUNTER — APPOINTMENT (OUTPATIENT)
Dept: CARDIOLOGY | Facility: CLINIC | Age: 84
End: 2019-07-24
Payer: MEDICARE

## 2019-07-24 ENCOUNTER — APPOINTMENT (OUTPATIENT)
Dept: ORTHOPEDIC SURGERY | Facility: CLINIC | Age: 84
End: 2019-07-24
Payer: MEDICARE

## 2019-07-24 ENCOUNTER — FORM ENCOUNTER (OUTPATIENT)
Age: 84
End: 2019-07-24

## 2019-07-24 VITALS
BODY MASS INDEX: 23.62 KG/M2 | HEART RATE: 87 BPM | HEIGHT: 70 IN | DIASTOLIC BLOOD PRESSURE: 74 MMHG | SYSTOLIC BLOOD PRESSURE: 150 MMHG | OXYGEN SATURATION: 98 % | RESPIRATION RATE: 17 BRPM | WEIGHT: 165 LBS

## 2019-07-24 DIAGNOSIS — M16.12 UNILATERAL PRIMARY OSTEOARTHRITIS, LEFT HIP: ICD-10-CM

## 2019-07-24 DIAGNOSIS — I49.3 VENTRICULAR PREMATURE DEPOLARIZATION: ICD-10-CM

## 2019-07-24 PROCEDURE — 99204 OFFICE O/P NEW MOD 45 MIN: CPT

## 2019-07-24 PROCEDURE — 93000 ELECTROCARDIOGRAM COMPLETE: CPT

## 2019-07-24 PROCEDURE — 93306 TTE W/DOPPLER COMPLETE: CPT

## 2019-07-24 PROCEDURE — 99211 OFF/OP EST MAY X REQ PHY/QHP: CPT

## 2019-07-24 NOTE — ADDENDUM
[FreeTextEntry1] : I, Juan A Eagle, acted solely as a scribe for Dr. Omer Gill on 07/24/2019  .\par  \par All medical record entries made by the scribe were at my, Dr. Omer Gill, direction and personally dictated by me on 07/24/2019. I have reviewed the chart and agree that the record accurately reflects my personal performance of the history, physical exam, assessment and plan. I have also personally directed, reviewed, and agreed with the chart.\par

## 2019-07-24 NOTE — HISTORY OF PRESENT ILLNESS
[de-identified] : 88 year old male presents for preop skin check for left THR 7/26/19. he has continued with chronic intermittent left hip pain. He has pain weightbearing and the pain is affecting the patient's quality of life and compromising daily normal activity. Cortisone injection summer 2018 provided minimal relief of pain for a short duration. Medical history thrombocytopenia He states he is udnergoing a stress test tomorrow.

## 2019-07-24 NOTE — DISCUSSION/SUMMARY
[de-identified] : The patient presents for preop skin check for left THR. We discussed the nature of the condition and treatment options. The patient is a candidate for surgery based on imaging and quality of life. We discussed risks, benefits and alternatives to surgery. The patient will proceed with surgery. \par \par 1) I reviewed the plan of care as well as a model of a left hip implant equivalent to the one that will e used for their total hip joint replacement\par 2) The patient reviewed to the plan of care as well as the use of implants for their total hip replacement.\par

## 2019-07-24 NOTE — PHYSICAL EXAM
[de-identified] : Well-nourished, in no acute distress\par Alert and oriented to time, place and person\par Skin: no lesions discoloration\par Respirations: unlabored\par Cardiac: no leg swelling\par Lymphatic: no groin adenopathy\par left hip: surgical site skin intact. 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

## 2019-07-25 ENCOUNTER — OUTPATIENT (OUTPATIENT)
Dept: OUTPATIENT SERVICES | Facility: HOSPITAL | Age: 84
LOS: 1 days | End: 2019-07-25
Payer: MEDICARE

## 2019-07-25 DIAGNOSIS — Z98.890 OTHER SPECIFIED POSTPROCEDURAL STATES: Chronic | ICD-10-CM

## 2019-07-25 DIAGNOSIS — R94.31 ABNORMAL ELECTROCARDIOGRAM [ECG] [EKG]: ICD-10-CM

## 2019-07-25 PROCEDURE — 93018 CV STRESS TEST I&R ONLY: CPT

## 2019-07-25 PROCEDURE — 93016 CV STRESS TEST SUPVJ ONLY: CPT

## 2019-07-25 PROCEDURE — A9500: CPT

## 2019-07-25 PROCEDURE — 78452 HT MUSCLE IMAGE SPECT MULT: CPT | Mod: 26

## 2019-07-25 PROCEDURE — 93017 CV STRESS TEST TRACING ONLY: CPT

## 2019-07-25 PROCEDURE — 78452 HT MUSCLE IMAGE SPECT MULT: CPT

## 2019-07-25 RX ORDER — REGADENOSON 0.08 MG/ML
0.4 INJECTION, SOLUTION INTRAVENOUS ONCE
Refills: 0 | Status: COMPLETED | OUTPATIENT
Start: 2019-07-25 | End: 2019-07-25

## 2019-07-25 RX ADMIN — REGADENOSON 0.4 MILLIGRAM(S): 0.08 INJECTION, SOLUTION INTRAVENOUS at 10:45

## 2019-07-25 NOTE — DISCUSSION/SUMMARY
[Procedure Intermediate Risk] : the procedure risk is intermediate [Patient Intermediate Risk] : the patient is an intermediate risk [As per surgery] : as per surgery [Continue] : Continue medications as currently directed [Optimized for Surgery] : the patient is optimized for surgery [FreeTextEntry1] :  cardiac monitor intra-op

## 2019-07-25 NOTE — HISTORY OF PRESENT ILLNESS
[Preoperative Visit] : for a medical evaluation prior to surgery [Scheduled Procedure ___] : a [unfilled] [Date of Surgery ___] : on [unfilled] [Surgeon Name ___] : surgeon: [unfilled] [Good] : Good [Alcohol Use] : alcohol use [Prior Anesthesia] : Prior anesthesia [Electrocardiogram] : ~T an ECG ~C was performed [Fever] : no fever [Chills] : no chills [Fatigue] : no fatigue [Chest Pain] : no chest pain [Cough] : no cough [Dyspnea] : no dyspnea [Dysuria] : no dysuria [Urinary Frequency] : no urinary frequency [Vomiting] : no vomiting [Diarrhea] : no diarrhea [Abdominal Pain] : no abdominal pain [Easy Bruising] : no easy bruising [Lower Extremity Swelling] : no lower extremity swelling [Poor Exercise Tolerance] : no poor exercise tolerance [Diabetes] : no diabetes [Cardiovascular Disease] : no cardiovascular disease [Pulmonary Disease] : no pulmonary disease [Anti-Platelet Agents] : no anti-platelet agents [Nicotine Dependence] : no nicotine dependence [Renal Disease] : no renal disease [GI Disease] : no gastrointestinal disease [Sleep Apnea] : no sleep apnea [Thromboembolic Problems] : no thromboembolic problems [Frequent use of NSAIDs] : no use of NSAIDs [Transfusion Reaction] : no transfusion reaction [Impaired Immunity] : no impaired immunity [Steroid Use in Last 6 Months] : no steroid use in the last six months [Frequent Aspirin Use] : no frequent aspirin use [Prev Anesthesia Reaction] : no previous anesthesia reaction [Anesthesia Reaction] : no anesthesia reaction [Sudden Death] : no sudden death [Clotting Disorder] : no clotting disorder [Bleeding Disorder] : no bleeding disorder

## 2019-07-25 NOTE — PHYSICAL EXAM
[General Appearance - Well Developed] : well developed [Normal Appearance] : normal appearance [Well Groomed] : well groomed [No Deformities] : no deformities [General Appearance - Well Nourished] : well nourished [General Appearance - In No Acute Distress] : no acute distress [Normal Conjunctiva] : the conjunctiva exhibited no abnormalities [Eyelids - No Xanthelasma] : the eyelids demonstrated no xanthelasmas [Normal Oral Mucosa] : normal oral mucosa [No Oral Pallor] : no oral pallor [No Oral Cyanosis] : no oral cyanosis [Normal Jugular Venous A Waves Present] : normal jugular venous A waves present [Normal Jugular Venous V Waves Present] : normal jugular venous V waves present [No Jugular Venous Diop A Waves] : no jugular venous diop A waves [Respiration, Rhythm And Depth] : normal respiratory rhythm and effort [Exaggerated Use Of Accessory Muscles For Inspiration] : no accessory muscle use [Auscultation Breath Sounds / Voice Sounds] : lungs were clear to auscultation bilaterally [Normal Rate] : normal [Normal S1] : normal S1 [Normal S2] : normal S2 [No Murmur] : no murmurs heard [No Abnormalities] : the abdominal aorta was not enlarged and no bruit was heard [2+] : left 2+ [No Pitting Edema] : no pitting edema present [Abdomen Soft] : soft [Abdomen Tenderness] : non-tender [Abdomen Mass (___ Cm)] : no abdominal mass palpated [Abnormal Walk] : normal gait [Gait - Sufficient For Exercise Testing] : the gait was sufficient for exercise testing [Nail Clubbing] : no clubbing of the fingernails [Cyanosis, Localized] : no localized cyanosis [Petechial Hemorrhages (___cm)] : no petechial hemorrhages [Skin Color & Pigmentation] : normal skin color and pigmentation [] : no rash [No Venous Stasis] : no venous stasis [Skin Lesions] : no skin lesions [No Skin Ulcers] : no skin ulcer [No Xanthoma] : no  xanthoma was observed [Oriented To Time, Place, And Person] : oriented to person, place, and time [Affect] : the affect was normal [Mood] : the mood was normal [No Anxiety] : not feeling anxious [S3] : no S3 [S4] : no S4 [Right Carotid Bruit] : no bruit heard over the right carotid [Left Carotid Bruit] : no bruit heard over the left carotid [Right Femoral Bruit] : no bruit heard over the right femoral artery [Left Femoral Bruit] : no bruit heard over the left femoral artery

## 2019-07-26 ENCOUNTER — APPOINTMENT (OUTPATIENT)
Dept: ORTHOPEDIC SURGERY | Facility: AMBULATORY MEDICAL SERVICES | Age: 84
End: 2019-07-26
Payer: MEDICARE

## 2019-07-26 PROCEDURE — 27130 TOTAL HIP ARTHROPLASTY: CPT | Mod: LT

## 2019-08-07 ENCOUNTER — APPOINTMENT (OUTPATIENT)
Dept: ORTHOPEDIC SURGERY | Facility: CLINIC | Age: 84
End: 2019-08-07
Payer: MEDICARE

## 2019-08-07 PROCEDURE — 73502 X-RAY EXAM HIP UNI 2-3 VIEWS: CPT | Mod: LT

## 2019-08-07 PROCEDURE — 99024 POSTOP FOLLOW-UP VISIT: CPT

## 2019-08-07 NOTE — ADDENDUM
[FreeTextEntry1] : I, Juan A Eagle, acted solely as a scribe for Dr. Omer Gill on 08/07/2019  .\par  \par All medical record entries made by the scribe were at my, Dr. Omer Gill, direction and personally dictated by me on 08/07/2019. I have reviewed the chart and agree that the record accurately reflects my personal performance of the history, physical exam, assessment and plan. I have also personally directed, reviewed, and agreed with the chart.\par

## 2019-08-07 NOTE — HISTORY OF PRESENT ILLNESS
[de-identified] : s/p left THR 7/26/19 [de-identified] : 89 year old male presents s/p left THR. Significant improvement of preop pain. The patient denies fever/chills/groin pain/thigh pain. He is ambulating with a cane.  [de-identified] : Well nourished and no distress. \par left hip: incision healed, neurovascularly intact, PROM pain free sutures intact [de-identified] : AP Pelvis and AP/Lateral Xrays of the left hip taken in the office today demonstrates satisfactory appearance left total hip components and alignment. \par  [de-identified] : left THR [de-identified] : sutures removed. I am prescribing outpatient therapy. FU 1 month

## 2019-09-04 ENCOUNTER — APPOINTMENT (OUTPATIENT)
Dept: ORTHOPEDIC SURGERY | Facility: CLINIC | Age: 84
End: 2019-09-04
Payer: MEDICARE

## 2019-09-04 DIAGNOSIS — M17.11 UNILATERAL PRIMARY OSTEOARTHRITIS, RIGHT KNEE: ICD-10-CM

## 2019-09-04 PROCEDURE — 99024 POSTOP FOLLOW-UP VISIT: CPT

## 2019-09-04 NOTE — ADDENDUM
[FreeTextEntry1] : I, Juan A Eagle, acted solely as a scribe for Dr. Omer Gill on 09/04/2019  .\par  \par All medical record entries made by the scribe were at my, Dr. Omer Gill, direction and personally dictated by me on 09/04/2019. I have reviewed the chart and agree that the record accurately reflects my personal performance of the history, physical exam, assessment and plan. I have also personally directed, reviewed, and agreed with the chart.\par

## 2019-09-04 NOTE — HISTORY OF PRESENT ILLNESS
[de-identified] : s/p left THR 7/26/19 [de-identified] : 89 year old male presents s/p left THR. Significant improvement of preop pain. The patient denies fever/chills/thigh pain. He experiences slight groin pain which Resolves once initiates ambulation. He is ambulating independently. [de-identified] : Well nourished and no distress. \par left hip: incision healed, neurovascularly intact, PROM pain free, leg lengths equal, resisted left hip flexion 5/5 provokes mild groin pain, satisfactory gait, leg lengths equal, [de-identified] : left THR [de-identified] : Outpatient PT. FU 3 months

## 2019-12-04 ENCOUNTER — APPOINTMENT (OUTPATIENT)
Dept: ORTHOPEDIC SURGERY | Facility: CLINIC | Age: 84
End: 2019-12-04
Payer: MEDICARE

## 2019-12-04 PROCEDURE — 99212 OFFICE O/P EST SF 10 MIN: CPT

## 2019-12-04 NOTE — DISCUSSION/SUMMARY
[de-identified] : The patient presents s/p left total hip replacement symptom free. Significant improvement in preop pain. FU 1 year.

## 2019-12-04 NOTE — ADDENDUM
[FreeTextEntry1] : I, Juan A Eagle, acted solely as a scribe for Dr. Omer Gill on 12/04/2019  .\par  \par All medical record entries made by the scribe were at my, Dr. Omer Gill, direction and personally dictated by me on 12/04/2019. I have reviewed the chart and agree that the record accurately reflects my personal performance of the history, physical exam, assessment and plan. I have also personally directed, reviewed, and agreed with the chart.\par

## 2019-12-04 NOTE — PHYSICAL EXAM
[de-identified] : Well-nourished, in no acute distress\par Alert and oriented to time, place and person\par Skin: no lesions discoloration\par Respirations: unlabored\par Cardiac: no leg swelling\par Lymphatic: no groin adenopathy\par left hip: flexion 110 degrees , internal 5 degrees , external 40 degrees , abduction 40 degrees , adduction 25 degrees SATISFActory gait nv intact\par  [de-identified] : No new images were taken in the office today.\par

## 2019-12-04 NOTE — HISTORY OF PRESENT ILLNESS
[de-identified] : 89 year old male presents s/p left total hip replacement  7/26/2019. Significant improvement of preop pain. The patient denies fever/chills/thigh pain. He now can walk 1.5 miles 3-4x per week- fast walking. He is ambulating independently.

## 2019-12-31 ENCOUNTER — MEDICATION RENEWAL (OUTPATIENT)
Age: 84
End: 2019-12-31

## 2020-08-17 ENCOUNTER — APPOINTMENT (OUTPATIENT)
Dept: INTERNAL MEDICINE | Facility: CLINIC | Age: 85
End: 2020-08-17
Payer: MEDICARE

## 2020-08-17 PROCEDURE — 99443: CPT | Mod: 95

## 2020-09-11 ENCOUNTER — TRANSCRIPTION ENCOUNTER (OUTPATIENT)
Age: 85
End: 2020-09-11

## 2020-10-21 ENCOUNTER — APPOINTMENT (OUTPATIENT)
Dept: INTERNAL MEDICINE | Facility: CLINIC | Age: 85
End: 2020-10-21
Payer: MEDICARE

## 2020-10-21 ENCOUNTER — NON-APPOINTMENT (OUTPATIENT)
Age: 85
End: 2020-10-21

## 2020-10-21 ENCOUNTER — OUTPATIENT (OUTPATIENT)
Dept: OUTPATIENT SERVICES | Facility: HOSPITAL | Age: 85
LOS: 1 days | End: 2020-10-21
Payer: MEDICARE

## 2020-10-21 ENCOUNTER — APPOINTMENT (OUTPATIENT)
Dept: RADIOLOGY | Facility: HOSPITAL | Age: 85
End: 2020-10-21
Payer: MEDICARE

## 2020-10-21 VITALS
HEART RATE: 101 BPM | RESPIRATION RATE: 17 BRPM | OXYGEN SATURATION: 97 % | HEIGHT: 70 IN | BODY MASS INDEX: 23.19 KG/M2 | WEIGHT: 162 LBS | DIASTOLIC BLOOD PRESSURE: 70 MMHG | SYSTOLIC BLOOD PRESSURE: 120 MMHG | TEMPERATURE: 97.2 F

## 2020-10-21 DIAGNOSIS — Z00.8 ENCOUNTER FOR OTHER GENERAL EXAMINATION: ICD-10-CM

## 2020-10-21 DIAGNOSIS — R21 RASH AND OTHER NONSPECIFIC SKIN ERUPTION: ICD-10-CM
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
## 2020-10-21 DIAGNOSIS — Z98.890 OTHER SPECIFIED POSTPROCEDURAL STATES: Chronic | ICD-10-CM
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
## 2020-10-21 DIAGNOSIS — Z86.2 PERSONAL HISTORY OF DISEASES OF THE BLOOD AND BLOOD-FORMING ORGANS AND CERTAIN DISORDERS INVOLVING THE IMMUNE MECHANISM: ICD-10-CM
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
## 2020-10-21 DIAGNOSIS — M16.9 OSTEOARTHRITIS OF HIP, UNSPECIFIED: ICD-10-CM

## 2020-10-21 DIAGNOSIS — M25.552 PAIN IN LEFT HIP: ICD-10-CM

## 2020-10-21 PROCEDURE — 73502 X-RAY EXAM HIP UNI 2-3 VIEWS: CPT

## 2020-10-21 PROCEDURE — G0439: CPT

## 2020-10-21 PROCEDURE — 73502 X-RAY EXAM HIP UNI 2-3 VIEWS: CPT | Mod: 26,RT

## 2020-10-21 PROCEDURE — G0444 DEPRESSION SCREEN ANNUAL: CPT | Mod: 59

## 2020-10-21 PROCEDURE — 93000 ELECTROCARDIOGRAM COMPLETE: CPT | Mod: 59

## 2020-10-22 PROBLEM — Z86.2 HISTORY OF THROMBOCYTOPENIA: Status: RESOLVED | Noted: 2018-04-23 | Resolved: 2020-10-22

## 2020-10-22 NOTE — REVIEW OF SYSTEMS
[Nocturia] : nocturia [Impotence] : impotence [Poor Libido] : poor libido [Joint Stiffness] : joint stiffness [Back Pain] : back pain [Mole Changes] : mole changes [Fever] : no fever [Chills] : no chills [Fatigue] : no fatigue [Hot Flashes] : no hot flashes [Night Sweats] : no night sweats [Recent Change In Weight] : ~T no recent weight change [Discharge] : no discharge [Pain] : no pain [Redness] : no redness [Dryness] : no dryness [Vision Problems] : no vision problems [Itching] : no itching [Earache] : no earache [Hearing Loss] : no hearing loss [Nosebleeds] : no nosebleeds [Postnasal Drip] : no postnasal drip [Nasal Discharge] : no nasal discharge [Sore Throat] : no sore throat [Hoarseness] : no hoarseness [Chest Pain] : no chest pain [Palpitations] : no palpitations [Claudication] : no  leg claudication [Lower Ext Edema] : no lower extremity edema [Orthopena] : no orthopnea [Shortness Of Breath] : no shortness of breath [Wheezing] : no wheezing [Cough] : no cough [Dyspnea on Exertion] : not dyspnea on exertion [Abdominal Pain] : no abdominal pain [Nausea] : no nausea [Constipation] : no constipation [Diarrhea] : no diarrhea [Vomiting] : no vomiting [Heartburn] : no heartburn [Melena] : no melena [Dysuria] : no dysuria [Incontinence] : no incontinence [Hesitancy] : no hesitancy [Hematuria] : no hematuria [Frequency] : no frequency [Joint Pain] : no joint pain [Muscle Pain] : no muscle pain [Muscle Weakness] : no muscle weakness [Joint Swelling] : no joint swelling [Nail Changes] : no nail changes [Hair Changes] : no hair changes [Skin Rash] : no skin rash [Headache] : no headache [Dizziness] : no dizziness [Fainting] : no fainting [Confusion] : no confusion [Unsteady Walk] : no ataxia [Memory Loss] : no memory loss [Suicidal] : not suicidal [Insomnia] : no insomnia [Anxiety] : no anxiety [Depression] : no depression [Easy Bleeding] : no easy bleeding [Easy Bruising] : no easy bruising [de-identified] : right ear

## 2020-10-22 NOTE — HEALTH RISK ASSESSMENT
[] : No [Yes] : Yes [No falls in past year] : Patient reported no falls in the past year [0] : 2) Feeling down, depressed, or hopeless: Not at all (0) [[id number]] : 0 [HIV test declined] : HIV test declined [Hepatitis C test declined] : Hepatitis C test declined

## 2020-10-22 NOTE — HISTORY OF PRESENT ILLNESS
[FreeTextEntry1] : Comes to the office for a comprehensive physical examination to review his medications and discuss his overall health recent issues with skin cancers and dizziness [de-identified] : 89-year-old man comes to the office for preventive physical examination with a history of Grovers disease thrombocytopenia hypertension benign prostatic hypertrophy intermittent dizziness osteoarthritis skin cancers patient is currently being treated with steroid injections by dermatology to treat his Grovers disease he denies temperature chills sweats or myalgias he has had no headache sinus congestion sore throat cough wheezing pleurisy chest pain shortness of breath exertional dyspnea lightheadedness vertigo or syncope.  He denies abdominal pain nausea vomiting diarrhea constipation bright red blood per rectum or black stools his appetite has been good his weight is been stable urinates frequently but denies dysuria or gross hematuria he has had no leg edema he has pains in his hip back and knees and he has been sleeping relatively well

## 2020-10-22 NOTE — ASSESSMENT
[FreeTextEntry1] : Physical examination shows a well-developed man in no acute distress blood pressure 120/70 pulse of 101 respirations at 16 height 5 feet 10 inches weight 162 pounds BMI 23.24 temperature of 97.2 °F surgeon saturation on room air 97% HEENT was unremarkable chest was clear cardiovascular exam was regular with a 1 out of 6 systolic murmur abdomen was soft extremities show trace bilateral edema neurologic exam was nonfocal EKG showed a regular sinus rhythm at 94 there were frequent ectopic ventricular beats poor R wave progression was noted given a slip for complete blood test including CBC full chemistries thyroid profile lipid profile urine analysis CRP hemoglobin A1c vitamins D3 and B12 and IgG antibodies for COVID-19  patient is followed regularly hematology concerning his thrombocytopenia .  He is up-to-date on his ophthalmologist and follows his dermatologist regularly for steroid injections of his Grovers disease defers on colorectal cancer screening pressure well controlled at present visit last platelet count was 125,000 patient does visit an orthopedic periodically for his osteoarthritis of his knees hips and back

## 2020-10-28 ENCOUNTER — APPOINTMENT (OUTPATIENT)
Dept: INTERNAL MEDICINE | Facility: CLINIC | Age: 85
End: 2020-10-28
Payer: MEDICARE

## 2020-10-28 DIAGNOSIS — Z23 ENCOUNTER FOR IMMUNIZATION: ICD-10-CM

## 2020-10-28 DIAGNOSIS — H61.21 IMPACTED CERUMEN, RIGHT EAR: ICD-10-CM

## 2020-10-28 DIAGNOSIS — H61.23 IMPACTED CERUMEN, BILATERAL: ICD-10-CM

## 2020-10-28 PROCEDURE — G0009: CPT

## 2020-10-28 PROCEDURE — 99215 OFFICE O/P EST HI 40 MIN: CPT | Mod: 25

## 2020-10-28 PROCEDURE — 90670 PCV13 VACCINE IM: CPT

## 2020-10-28 PROCEDURE — 69210 REMOVE IMPACTED EAR WAX UNI: CPT

## 2020-10-29 ENCOUNTER — MED ADMIN CHARGE (OUTPATIENT)
Age: 85
End: 2020-10-29

## 2020-10-29 VITALS
HEIGHT: 70 IN | WEIGHT: 163 LBS | BODY MASS INDEX: 23.34 KG/M2 | DIASTOLIC BLOOD PRESSURE: 74 MMHG | OXYGEN SATURATION: 98 % | RESPIRATION RATE: 16 BRPM | TEMPERATURE: 98.2 F | HEART RATE: 88 BPM | SYSTOLIC BLOOD PRESSURE: 124 MMHG

## 2020-10-29 PROBLEM — Z23 NEED FOR VACCINATION AGAINST STREPTOCOCCUS PNEUMONIAE USING PNEUMOCOCCAL CONJUGATE VACCINE 13: Status: ACTIVE | Noted: 2020-10-29

## 2020-10-29 PROBLEM — H61.21 EXCESSIVE CERUMEN IN EAR CANAL, RIGHT: Status: ACTIVE | Noted: 2017-02-28

## 2020-10-29 PROBLEM — H61.23 EXCESSIVE CERUMEN IN BOTH EAR CANALS: Status: ACTIVE | Noted: 2020-10-29

## 2020-10-29 NOTE — REVIEW OF SYSTEMS
[Hearing Loss] : hearing loss [Nocturia] : nocturia [Impotence] : impotence [Poor Libido] : poor libido [Joint Stiffness] : joint stiffness [Back Pain] : back pain [Fever] : no fever [Chills] : no chills [Fatigue] : no fatigue [Hot Flashes] : no hot flashes [Night Sweats] : no night sweats [Recent Change In Weight] : ~T no recent weight change [Discharge] : no discharge [Pain] : no pain [Redness] : no redness [Dryness] : no dryness [Vision Problems] : no vision problems [Itching] : no itching [Earache] : no earache [Nosebleeds] : no nosebleeds [Postnasal Drip] : no postnasal drip [Nasal Discharge] : no nasal discharge [Sore Throat] : no sore throat [Hoarseness] : no hoarseness [Chest Pain] : no chest pain [Palpitations] : no palpitations [Claudication] : no  leg claudication [Lower Ext Edema] : no lower extremity edema [Orthopena] : no orthopnea [Paroxysmal Nocturnal Dyspnea] : no paroxysmal nocturnal dyspnea [Shortness Of Breath] : no shortness of breath [Wheezing] : no wheezing [Cough] : no cough [Dyspnea on Exertion] : not dyspnea on exertion [Abdominal Pain] : no abdominal pain [Nausea] : no nausea [Constipation] : no constipation [Diarrhea] : no diarrhea [Vomiting] : no vomiting [Heartburn] : no heartburn [Melena] : no melena [Dysuria] : no dysuria [Incontinence] : no incontinence [Hesitancy] : no hesitancy [Hematuria] : no hematuria [Frequency] : no frequency [Joint Pain] : no joint pain [Muscle Pain] : no muscle pain [Muscle Weakness] : no muscle weakness [Joint Swelling] : no joint swelling [Mole Changes] : no mole changes [Nail Changes] : no nail changes [Hair Changes] : no hair changes [Skin Rash] : no skin rash [Headache] : no headache [Dizziness] : no dizziness [Fainting] : no fainting [Confusion] : no confusion [Unsteady Walk] : no ataxia [Memory Loss] : no memory loss [Suicidal] : not suicidal [Insomnia] : no insomnia [Anxiety] : no anxiety [Depression] : no depression [Easy Bleeding] : no easy bleeding [Easy Bruising] : no easy bruising [FreeTextEntry4] : wax

## 2020-10-29 NOTE — HEALTH RISK ASSESSMENT
[Yes] : Yes [No falls in past year] : Patient reported no falls in the past year [0] : 2) Feeling down, depressed, or hopeless: Not at all (0) [] : No [[id number]] : 0

## 2020-10-29 NOTE — ASSESSMENT
[FreeTextEntry1] : Physical examination reveals a well-developed man in no acute distress blood pressure 124/74 height 5 foot 10 inches weight 163 pounds BMI 23.39 temperature of 98.2 °F heart rate of 8816 oxygen saturation on room air 98% HEENT was unremarkable both ear canals were occluded with cerumen with instrumentation and flush irrigation both canals were free of wax patient with immediate response to his hearing chest was clear cardiovascular was regular abdomen was soft extremities show trace bilateral edema neurologic exam was nonfocal the Prevnar 13 vaccine was administered patient has received the pneumonia vaccine for 2020 he was advised to get the new shingles vaccine patient had recent blood test significant only for creatinine of 1.48 his creatinine has been mildly elevated for the last 5 years up-to-date with his ophthalmologist and dermatologist he defers on colorectal cancer screening patient is active although disappointment that he cannot run the track anymore he does walk it and claims to walk 2 to 3 miles a day patient's blood pressure well controlled at the present visit platelet count 153 patient says that at night he is only getting up once to go to the bathroom

## 2020-10-29 NOTE — HISTORY OF PRESENT ILLNESS
[FreeTextEntry1] : 89-year-old man comes to the office for follow-up to review his medications discuss his overall health received the Prevnar 13 vaccine go over recent blood tests and have his ears irrigated for wax [de-identified] : Comes to  the office for follow-up with a history of Grovers disease hypertension benign prostatic hyperplasia ear cerumen thrombocytopenia and osteoarthritis his main complaint is arthritic complaints decreased hearing in both ears he denies temperature chills sweats or myalgias he has had no headache sinus congestion sore throat cough wheezing pleurisy chest pain shortness of breath exertional dyspnea lightheadedness palpitations dizziness vertigo or syncope he has had no abdominal pain nausea vomiting diarrhea constipation bright red blood per rectum or black stools his appetite has been good his weight has been stable he has diffuse DO arthritic complaints he urinates frequently and does get up at night to urinate but denies dysuria or gross hematuria he has had no significant leg edema skin rashes and sleeps reasonably well

## 2020-10-30 LAB
25(OH)D3 SERPL-MCNC: 39.3 NG/ML
ALBUMIN SERPL ELPH-MCNC: 4.4 G/DL
ALP BLD-CCNC: 92 U/L
ALT SERPL-CCNC: 11 U/L
ANION GAP SERPL CALC-SCNC: 11 MMOL/L
APPEARANCE: CLEAR
AST SERPL-CCNC: 19 U/L
BASOPHILS # BLD AUTO: 0.03 K/UL
BASOPHILS NFR BLD AUTO: 0.5 %
BILIRUB SERPL-MCNC: 0.8 MG/DL
BILIRUBIN URINE: NEGATIVE
BLOOD URINE: NEGATIVE
BUN SERPL-MCNC: 22 MG/DL
CALCIUM SERPL-MCNC: 10.1 MG/DL
CHLORIDE SERPL-SCNC: 104 MMOL/L
CHOLEST SERPL-MCNC: 153 MG/DL
CO2 SERPL-SCNC: 26 MMOL/L
COLOR: NORMAL
CREAT SERPL-MCNC: 1.48 MG/DL
CRP SERPL HS-MCNC: 2.49 MG/L
EOSINOPHIL # BLD AUTO: 0.32 K/UL
EOSINOPHIL NFR BLD AUTO: 4.8 %
ESTIMATED AVERAGE GLUCOSE: 111 MG/DL
GLUCOSE BS SERPL-MCNC: 92 MG/DL
GLUCOSE QUALITATIVE U: NEGATIVE
GLUCOSE SERPL-MCNC: 98 MG/DL
HBA1C MFR BLD HPLC: 5.5 %
HCT VFR BLD CALC: 45.5 %
HDLC SERPL-MCNC: 49 MG/DL
HGB BLD-MCNC: 14.7 G/DL
IMM GRANULOCYTES NFR BLD AUTO: 0.3 %
KETONES URINE: NEGATIVE
LDLC SERPL CALC-MCNC: 94 MG/DL
LEUKOCYTE ESTERASE URINE: NEGATIVE
LYMPHOCYTES # BLD AUTO: 1.53 K/UL
LYMPHOCYTES NFR BLD AUTO: 23 %
MAN DIFF?: NORMAL
MCHC RBC-ENTMCNC: 31.4 PG
MCHC RBC-ENTMCNC: 32.3 GM/DL
MCV RBC AUTO: 97.2 FL
MONOCYTES # BLD AUTO: 0.77 K/UL
MONOCYTES NFR BLD AUTO: 11.6 %
NEUTROPHILS # BLD AUTO: 3.97 K/UL
NEUTROPHILS NFR BLD AUTO: 59.8 %
NITRITE URINE: NEGATIVE
NONHDLC SERPL-MCNC: 104 MG/DL
PH URINE: 6.5
PLATELET # BLD AUTO: 153 K/UL
POTASSIUM SERPL-SCNC: 4.6 MMOL/L
PROT SERPL-MCNC: 6.7 G/DL
PROTEIN URINE: NEGATIVE
RBC # BLD: 4.68 M/UL
RBC # FLD: 13.5 %
SARS-COV-2 IGG SERPL IA-ACNC: <3.8 AU/ML
SARS-COV-2 IGG SERPL QL IA: NEGATIVE
SODIUM SERPL-SCNC: 142 MMOL/L
SPECIFIC GRAVITY URINE: 1.01
T4 FREE SERPL-MCNC: 1.3 NG/DL
TRIGL SERPL-MCNC: 51 MG/DL
TSH SERPL-ACNC: 3.89 UIU/ML
UROBILINOGEN URINE: NORMAL
VIT B12 SERPL-MCNC: 409 PG/ML
WBC # FLD AUTO: 6.64 K/UL

## 2020-12-02 ENCOUNTER — APPOINTMENT (OUTPATIENT)
Dept: ORTHOPEDIC SURGERY | Facility: CLINIC | Age: 85
End: 2020-12-02
Payer: MEDICARE

## 2020-12-02 DIAGNOSIS — Z96.642 PRESENCE OF LEFT ARTIFICIAL HIP JOINT: ICD-10-CM

## 2020-12-02 PROCEDURE — 72170 X-RAY EXAM OF PELVIS: CPT

## 2020-12-02 PROCEDURE — 99213 OFFICE O/P EST LOW 20 MIN: CPT

## 2020-12-02 NOTE — REASON FOR VISIT
[Follow-Up Visit] : a follow-up visit for [Hip Pain] : hip pain [FreeTextEntry2] : Left total hip replacement

## 2020-12-02 NOTE — CONSULT LETTER
[Dear  ___] : Dear  [unfilled], [Consult Letter:] : I had the pleasure of evaluating your patient, [unfilled]. [Please see my note below.] : Please see my note below. [Consult Closing:] : Thank you very much for allowing me to participate in the care of this patient.  If you have any questions, please do not hesitate to contact me. [Sincerely,] : Sincerely, [FreeTextEntry2] : Rob Curtis [FreeTextEntry3] : Omer Gill MD\par

## 2020-12-02 NOTE — DISCUSSION/SUMMARY
[de-identified] : The patient presents s/p left total hip replacement symptom free. Significant improvement in preop pain. FU in 1 year.

## 2020-12-02 NOTE — PHYSICAL EXAM
[de-identified] : Well-nourished, in no acute distress\par Alert and oriented to time, place and person\par Skin: no lesions discoloration\par Respirations: unlabored\par Cardiac: no leg swelling\par Lymphatic: no groin adenopathy\par left hip: flexion 110 degrees , internal 5 degrees , external 40 degrees , abduction 40 degrees , adduction 25 degrees SATISFActory gait nv intact  Pain-free resisted left hip flexion 5/5 pain-free resisted left knee extension 5/5 pain-free no local tenderness\par  [de-identified] : o Left Hip and pelvis : AP and lateral views were obtained, there are no soft tissue abnormalities, no fractures, alignment is normal, normal bone density, no bony lesions s/p total hip arthroplasty in good positioning, and proper alignment, no signs of loosening.

## 2020-12-08 ENCOUNTER — RX RENEWAL (OUTPATIENT)
Age: 85
End: 2020-12-08

## 2021-01-13 ENCOUNTER — APPOINTMENT (OUTPATIENT)
Dept: ULTRASOUND IMAGING | Facility: HOSPITAL | Age: 86
End: 2021-01-13
Payer: MEDICARE

## 2021-01-13 ENCOUNTER — OUTPATIENT (OUTPATIENT)
Dept: OUTPATIENT SERVICES | Facility: HOSPITAL | Age: 86
LOS: 1 days | End: 2021-01-13
Payer: MEDICARE

## 2021-01-13 ENCOUNTER — APPOINTMENT (OUTPATIENT)
Dept: INTERNAL MEDICINE | Facility: CLINIC | Age: 86
End: 2021-01-13
Payer: MEDICARE

## 2021-01-13 VITALS
TEMPERATURE: 97.1 F | DIASTOLIC BLOOD PRESSURE: 78 MMHG | WEIGHT: 163 LBS | OXYGEN SATURATION: 99 % | BODY MASS INDEX: 23.34 KG/M2 | HEIGHT: 70 IN | SYSTOLIC BLOOD PRESSURE: 142 MMHG | RESPIRATION RATE: 16 BRPM | HEART RATE: 82 BPM

## 2021-01-13 DIAGNOSIS — Z98.890 OTHER SPECIFIED POSTPROCEDURAL STATES: Chronic | ICD-10-CM

## 2021-01-13 DIAGNOSIS — R60.0 LOCALIZED EDEMA: ICD-10-CM

## 2021-01-13 PROCEDURE — 93971 EXTREMITY STUDY: CPT | Mod: 26,LT

## 2021-01-13 PROCEDURE — 93971 EXTREMITY STUDY: CPT

## 2021-01-13 PROCEDURE — 99215 OFFICE O/P EST HI 40 MIN: CPT

## 2021-01-14 NOTE — HISTORY OF PRESENT ILLNESS
[FreeTextEntry1] : 89-year-old man comes to the office for follow-up review his medications and discuss his overall health recently had skin biopsies of his left lower extremity since then his leg has swelled [de-identified] : Comes to the office for follow-up with a history of hypertension benign prostatic hyperplasia Grovers disease osteoarthritis and multiple skin cancers patient has been going removal of skin cancers from his left lower extremity and has noticed that his leg on that side has been swollen he denies pain in his knee or ankle calf pain temperature chills sweats or myalgias he has had no headaches sinus congestion sore throat cough wheezing pleurisy chest pain shortness of breath exertional dyspnea lightheadedness palpitations dizziness vertigo or syncope he has had no abdominal pain heartburn dysphagia nausea vomiting diarrhea constipation bright red blood per rectum or black stools his appetite has been good his weight has been stable his chronic discomfort in his back and hips and knees he urinates frequently and does get up at night to urinate but denies dysuria or gross hematuria he has been sleeping relatively well

## 2021-01-14 NOTE — ASSESSMENT
[FreeTextEntry1] : Physical examination shows a well-developed man in no acute distress blood pressure 142/78 height 5 foot 10 inches weight 163 pounds BMI 23.39 temperature of 97.1 °F heart rate of 82 respirations 16 HEENT was unremarkable chest was clear cardiovascular exam was regular abdomen was soft extremities showed 1+ edema of the left lower extremity to the knee with only trace edema of the right lower extremity there were multiple areas with Band-Aids which with the sites of his dermatologic removal of skin cancers.  No Homans' sign there was no tenderness to his calf neurologic exam was nonfocal patient sent for immediate Doppler rule out DVT discussed with dermatologist who believes that this edema can be seen at times post skin biopsies since had complete blood test in October 2020 he is up-to-date with his ophthalmologist and dermatologist he defers on colorectal cancer screening he has received this years influenza vaccine and also this year has received the Prevnar 13 vaccine he was reminded to consider the Pneumovax 23 and the new shingles vaccines was well controlled at the present visit he has been getting up only once occasionally twice at night to urinate which is acceptable to him

## 2021-01-14 NOTE — HEALTH RISK ASSESSMENT
[] : No [Yes] : Yes [No falls in past year] : Patient reported no falls in the past year [0] : 2) Feeling down, depressed, or hopeless: Not at all (0) [[id number]] : 0 Statement Selected

## 2021-01-14 NOTE — PHYSICAL EXAM
[Well Nourished] : well nourished [Well Developed] : well developed [Well-Appearing] : well-appearing [Normal Voice/Communication] : normal voice/communication [Normal Sclera/Conjunctiva] : normal sclera/conjunctiva [PERRL] : pupils equal round and reactive to light [EOMI] : extraocular movements intact [Normal Outer Ear/Nose] : the outer ears and nose were normal in appearance [Normal Oropharynx] : the oropharynx was normal [Normal TMs] : both tympanic membranes were normal [Normal Nasal Mucosa] : the nasal mucosa was normal [No JVD] : no jugular venous distention [Supple] : supple [No Lymphadenopathy] : no lymphadenopathy [Thyroid Normal, No Nodules] : the thyroid was normal and there were no nodules present [No Respiratory Distress] : no respiratory distress  [No Accessory Muscle Use] : no accessory muscle use [Clear to Auscultation] : lungs were clear to auscultation bilaterally [Normal Percussion] : the chest was normal to percussion [Normal Rate] : normal rate  [Regular Rhythm] : with a regular rhythm [Normal S1, S2] : normal S1 and S2 [No Murmur] : no murmur heard [No Carotid Bruits] : no carotid bruits [No Abdominal Bruit] : a ~M bruit was not heard ~T in the abdomen [No Varicosities] : no varicosities [Pedal Pulses Present] : the pedal pulses are present [No Edema] : there was no peripheral edema [No Palpable Aorta] : no palpable aorta [No Extremity Clubbing/Cyanosis] : no extremity clubbing/cyanosis [Declined Breast Exam] : declined breast exam  [Soft] : abdomen soft [Non Tender] : non-tender [Non-distended] : non-distended [No Masses] : no abdominal mass palpated [No HSM] : no HSM [Normal Bowel Sounds] : normal bowel sounds [No Hernias] : no hernias [Declined Rectal Exam] : declined rectal exam [Normal Supraclavicular Nodes] : no supraclavicular lymphadenopathy [Normal Axillary Nodes] : no axillary lymphadenopathy [Normal Posterior Cervical Nodes] : no posterior cervical lymphadenopathy [Normal Anterior Cervical Nodes] : no anterior cervical lymphadenopathy [Normal Inguinal Nodes] : no inguinal lymphadenopathy [Normal Femoral Nodes] : no femoral lymphadenopathy [No CVA Tenderness] : no CVA  tenderness [No Spinal Tenderness] : no spinal tenderness [Grossly Normal Strength/Tone] : grossly normal strength/tone [No Joint Swelling] : no joint swelling [No Rash] : no rash [No Skin Lesions] : no skin lesions [Coordination Grossly Intact] : coordination grossly intact [No Focal Deficits] : no focal deficits [Normal Gait] : normal gait [Deep Tendon Reflexes (DTR)] : deep tendon reflexes were 2+ and symmetric [Speech Grossly Normal] : speech grossly normal [Memory Grossly Normal] : memory grossly normal [Normal Affect] : the affect was normal [Alert and Oriented x3] : oriented to person, place, and time [Normal Mood] : the mood was normal [Normal Insight/Judgement] : insight and judgment were intact [Kyphosis] : no kyphosis [Scoliosis] : no scoliosis [Acne] : no acne

## 2021-01-14 NOTE — REVIEW OF SYSTEMS
[Lower Ext Edema] : lower extremity edema [Nocturia] : nocturia [Impotence] : impotence [Poor Libido] : poor libido [Joint Stiffness] : joint stiffness [Back Pain] : back pain [Fever] : no fever [Chills] : no chills [Fatigue] : no fatigue [Hot Flashes] : no hot flashes [Night Sweats] : no night sweats [Recent Change In Weight] : ~T no recent weight change [Discharge] : no discharge [Pain] : no pain [Redness] : no redness [Dryness] : no dryness [Vision Problems] : no vision problems [Itching] : no itching [Earache] : no earache [Hearing Loss] : no hearing loss [Nosebleeds] : no nosebleeds [Postnasal Drip] : no postnasal drip [Nasal Discharge] : no nasal discharge [Sore Throat] : no sore throat [Hoarseness] : no hoarseness [Chest Pain] : no chest pain [Palpitations] : no palpitations [Claudication] : no  leg claudication [Orthopena] : no orthopnea [Shortness Of Breath] : no shortness of breath [Paroxysmal Nocturnal Dyspnea] : no paroxysmal nocturnal dyspnea [Wheezing] : no wheezing [Cough] : no cough [Dyspnea on Exertion] : not dyspnea on exertion [Abdominal Pain] : no abdominal pain [Nausea] : no nausea [Constipation] : no constipation [Diarrhea] : no diarrhea [Vomiting] : no vomiting [Heartburn] : no heartburn [Melena] : no melena [Dysuria] : no dysuria [Incontinence] : no incontinence [Hesitancy] : no hesitancy [Hematuria] : no hematuria [Frequency] : frequency [Joint Pain] : no joint pain [Muscle Pain] : no muscle pain [Muscle Weakness] : no muscle weakness [Joint Swelling] : no joint swelling [Mole Changes] : no mole changes [Nail Changes] : no nail changes [Hair Changes] : no hair changes [Skin Rash] : no skin rash [Headache] : no headache [Dizziness] : no dizziness [Fainting] : no fainting [Confusion] : no confusion [Unsteady Walk] : no ataxia [Memory Loss] : no memory loss [Suicidal] : not suicidal [Insomnia] : no insomnia [Anxiety] : no anxiety [Depression] : no depression [Easy Bleeding] : no easy bleeding [Easy Bruising] : no easy bruising [FreeTextEntry5] : left lower extremity

## 2021-05-08 NOTE — HISTORY OF PRESENT ILLNESS
Confirmed with [facility] pharmacy [place] [facility] [place] that they received the ritalin 20 mg rx on 11/9/20. Patient has not picked up yet.   Left message advising patient that the pharmacy has his prescription on file. He is to call our office with further questions.    Name band; [de-identified] : followup left total hip replacement  7/26/2019. Significant improvement of preop pain. The patient denies fever/chills/thigh pain. He now can walk 1.5 miles 3-4x per week- fast walking. He is ambulating independently. Patient is here today for a one year follow up s/p total hip replacement. Patient does experience occasional momentary mild discomfort left groin with unpredictable movement. Otherwise symptom-free left hip. Does experience occasional soreness right hip with established diagnosis of right hip degenerative arthritis

## 2021-07-29 ENCOUNTER — APPOINTMENT (OUTPATIENT)
Dept: INTERNAL MEDICINE | Facility: CLINIC | Age: 86
End: 2021-07-29
Payer: MEDICARE

## 2021-07-29 VITALS
SYSTOLIC BLOOD PRESSURE: 138 MMHG | RESPIRATION RATE: 16 BRPM | TEMPERATURE: 97.5 F | BODY MASS INDEX: 22.48 KG/M2 | DIASTOLIC BLOOD PRESSURE: 64 MMHG | WEIGHT: 157 LBS | HEIGHT: 70 IN | OXYGEN SATURATION: 98 % | HEART RATE: 76 BPM

## 2021-07-29 PROCEDURE — 99215 OFFICE O/P EST HI 40 MIN: CPT

## 2021-07-29 RX ORDER — DOXYCYCLINE HYCLATE 100 MG/1
100 CAPSULE ORAL DAILY
Refills: 0 | Status: DISCONTINUED | COMMUNITY
Start: 2021-01-13 | End: 2021-07-29

## 2021-07-29 NOTE — ASU PATIENT PROFILE, ADULT - BLOOD AVOIDANCE/RESTRICTIONS, PROFILE
Problem: Patient Education: Go to Patient Education Activity  Goal: Patient/Family Education  Outcome: Progressing Towards Goal     Problem: DKA: Day 1  Goal: Off Pathway (Use only if patient is Off Pathway)  Outcome: Progressing Towards Goal  Goal: Activity/Safety  Outcome: Progressing Towards Goal  Goal: Consults, if ordered  Outcome: Progressing Towards Goal  Goal: Diagnostic Tests/Procedures, if Ordered  Outcome: Progressing Towards Goal  Goal: Nutrition/Diet  Outcome: Progressing Towards Goal  Goal: Discharge Planning  Outcome: Progressing Towards Goal  Goal: Medications  Outcome: Progressing Towards Goal  Goal: Respiratory  Outcome: Progressing Towards Goal  Goal: Treatments/Interventions/Procedures  Outcome: Progressing Towards Goal  Goal: Psychosocial  Outcome: Progressing Towards Goal  Goal: *Hemodynamically stable  Outcome: Progressing Towards Goal  Goal: *Blood glucose falling 50 to 100 mg/dl/hr  Outcome: Progressing Towards Goal  Goal: *Potassium normalizing  Outcome: Progressing Towards Goal     Problem: Diabetes Self-Management  Goal: *Disease process and treatment process  Description: Define diabetes and identify own type of diabetes; list 3 options for treating diabetes. Outcome: Progressing Towards Goal  Goal: *Incorporating nutritional management into lifestyle  Description: Describe effect of type, amount and timing of food on blood glucose; list 3 methods for planning meals. Outcome: Progressing Towards Goal  Goal: *Incorporating physical activity into lifestyle  Description: State effect of exercise on blood glucose levels. Outcome: Progressing Towards Goal  Goal: *Developing strategies to promote health/change behavior  Description: Define the ABC's of diabetes; identify appropriate screenings, schedule and personal plan for screenings.   Outcome: Progressing Towards Goal  Goal: *Using medications safely  Description: State effect of diabetes medications on diabetes; name diabetes medication taking, action and side effects. Outcome: Progressing Towards Goal  Goal: *Monitoring blood glucose, interpreting and using results  Description: Identify recommended blood glucose targets  and personal targets. Outcome: Progressing Towards Goal  Goal: *Prevention, detection, treatment of acute complications  Description: List symptoms of hyper- and hypoglycemia; describe how to treat low blood sugar and actions for lowering  high blood glucose level. Outcome: Progressing Towards Goal  Goal: *Prevention, detection and treatment of chronic complications  Description: Define the natural course of diabetes and describe the relationship of blood glucose levels to long term complications of diabetes.   Outcome: Progressing Towards Goal  Goal: *Developing strategies to address psychosocial issues  Description: Describe feelings about living with diabetes; identify support needed and support network  Outcome: Progressing Towards Goal  Goal: *Insulin pump training  Outcome: Progressing Towards Goal  Goal: *Sick day guidelines  Outcome: Progressing Towards Goal  Goal: *Patient Specific Goal (EDIT GOAL, INSERT TEXT)  Outcome: Progressing Towards Goal     Problem: Patient Education: Go to Patient Education Activity  Goal: Patient/Family Education  Outcome: Progressing Towards Goal     Problem: Anxiety  Goal: *Alleviation of anxiety  Outcome: Progressing Towards Goal  Goal: *Alleviation of anxiety (Palliative Care)  Outcome: Progressing Towards Goal     Problem: Patient Education: Go to Patient Education Activity  Goal: Patient/Family Education  Outcome: Progressing Towards Goal none

## 2021-08-01 NOTE — REVIEW OF SYSTEMS
[Fever] : no fever [Chills] : no chills [Fatigue] : no fatigue [Hot Flashes] : no hot flashes [Night Sweats] : no night sweats [Recent Change In Weight] : ~T no recent weight change [Discharge] : no discharge [Pain] : no pain [Redness] : no redness [Dryness] : no dryness [Vision Problems] : no vision problems [Itching] : no itching [Earache] : no earache [Hearing Loss] : no hearing loss [Nosebleeds] : no nosebleeds [Postnasal Drip] : no postnasal drip [Nasal Discharge] : no nasal discharge [Sore Throat] : no sore throat [Hoarseness] : no hoarseness [Chest Pain] : no chest pain [Palpitations] : no palpitations [Claudication] : no  leg claudication [Lower Ext Edema] : lower extremity edema [Orthopena] : no orthopnea [Paroxysmal Nocturnal Dyspnea] : no paroxysmal nocturnal dyspnea [Shortness Of Breath] : no shortness of breath [Wheezing] : no wheezing [Cough] : no cough [Dyspnea on Exertion] : not dyspnea on exertion [Abdominal Pain] : no abdominal pain [Nausea] : no nausea [Constipation] : no constipation [Diarrhea] : no diarrhea [Vomiting] : no vomiting [Heartburn] : no heartburn [Melena] : no melena [Dysuria] : no dysuria [Incontinence] : no incontinence [Hesitancy] : no hesitancy [Nocturia] : nocturia [Hematuria] : no hematuria [Frequency] : frequency [Impotence] : impotence [Poor Libido] : poor libido [Joint Pain] : no joint pain [Joint Stiffness] : joint stiffness [Muscle Pain] : no muscle pain [Muscle Weakness] : no muscle weakness [Back Pain] : back pain [Joint Swelling] : no joint swelling [Mole Changes] : no mole changes [Nail Changes] : no nail changes [Hair Changes] : no hair changes [Skin Rash] : no skin rash [Headache] : no headache [Dizziness] : no dizziness [Fainting] : no fainting [Confusion] : no confusion [Unsteady Walk] : no ataxia [Memory Loss] : no memory loss [Suicidal] : not suicidal [Insomnia] : no insomnia [Anxiety] : no anxiety [Depression] : no depression [Easy Bleeding] : no easy bleeding [Easy Bruising] : no easy bruising

## 2021-08-01 NOTE — ASSESSMENT
[FreeTextEntry1] : Physical examination shows a well-developed elderly man in no acute distress blood pressure 138/64 height 5 foot 10 inches weight 157 pounds BMI 22.53 temperature 97.5 °F heart rate of 76 respirations 16 HEENT was unremarkable chest was clear cardiovascular exam was regular abdomen was soft extremities showed trace bilateral edema neurologic exam was nonfocal patient had complete blood test in October 2020 he is up-to-date with his ophthalmologist concerning his Grovers disease which is certainly rare I have referred him to an academic dermatology center at [facility] for a consultation and possible ideas on treatment patient follows with an orthopedist concerning his hips blood pressure is well controlled at the present visit he actively sees his cardiologist patient has received the influenza vaccine and the Prevnar 13 vaccine he will forward to me the name and dates of his COVID-19 vaccinations

## 2021-08-01 NOTE — PHYSICAL EXAM
[No Acute Distress] : no acute distress [Well Nourished] : well nourished [Well Developed] : well developed [Well-Appearing] : well-appearing [Normal Voice/Communication] : normal voice/communication [Normal Sclera/Conjunctiva] : normal sclera/conjunctiva [PERRL] : pupils equal round and reactive to light [EOMI] : extraocular movements intact [Normal Outer Ear/Nose] : the outer ears and nose were normal in appearance [Normal Oropharynx] : the oropharynx was normal [Normal TMs] : both tympanic membranes were normal [Normal Nasal Mucosa] : the nasal mucosa was normal [No JVD] : no jugular venous distention [No Lymphadenopathy] : no lymphadenopathy [Supple] : supple [Thyroid Normal, No Nodules] : the thyroid was normal and there were no nodules present [No Respiratory Distress] : no respiratory distress  [No Accessory Muscle Use] : no accessory muscle use [Clear to Auscultation] : lungs were clear to auscultation bilaterally [Normal Percussion] : the chest was normal to percussion [Normal Rate] : normal rate  [Regular Rhythm] : with a regular rhythm [Normal S1, S2] : normal S1 and S2 [No Murmur] : no murmur heard [No Carotid Bruits] : no carotid bruits [No Abdominal Bruit] : a ~M bruit was not heard ~T in the abdomen [No Varicosities] : no varicosities [Pedal Pulses Present] : the pedal pulses are present [No Edema] : there was no peripheral edema [No Palpable Aorta] : no palpable aorta [No Extremity Clubbing/Cyanosis] : no extremity clubbing/cyanosis [Declined Breast Exam] : declined breast exam  [Soft] : abdomen soft [Non Tender] : non-tender [Non-distended] : non-distended [No Masses] : no abdominal mass palpated [No HSM] : no HSM [Normal Bowel Sounds] : normal bowel sounds [No Hernias] : no hernias [Declined Rectal Exam] : declined rectal exam [Normal Supraclavicular Nodes] : no supraclavicular lymphadenopathy [Normal Axillary Nodes] : no axillary lymphadenopathy [Normal Posterior Cervical Nodes] : no posterior cervical lymphadenopathy [Normal Anterior Cervical Nodes] : no anterior cervical lymphadenopathy [Normal Inguinal Nodes] : no inguinal lymphadenopathy [Normal Femoral Nodes] : no femoral lymphadenopathy [No CVA Tenderness] : no CVA  tenderness [No Spinal Tenderness] : no spinal tenderness [Scoliosis] : no scoliosis [Kyphosis] : no kyphosis [No Joint Swelling] : no joint swelling [Grossly Normal Strength/Tone] : grossly normal strength/tone [No Rash] : no rash [No Skin Lesions] : no skin lesions [Acne] : no acne [Coordination Grossly Intact] : coordination grossly intact [No Focal Deficits] : no focal deficits [Normal Gait] : normal gait [Deep Tendon Reflexes (DTR)] : deep tendon reflexes were 2+ and symmetric [Speech Grossly Normal] : speech grossly normal [Memory Grossly Normal] : memory grossly normal [Normal Affect] : the affect was normal [Alert and Oriented x3] : oriented to person, place, and time [Normal Mood] : the mood was normal [Normal Insight/Judgement] : insight and judgment were intact

## 2021-08-01 NOTE — HISTORY OF PRESENT ILLNESS
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 [de-identified] : Comes to the office for follow-up with a history of Crohn's disease pruritus hypertension benign prostatic hyperplasia osteoarthritis and skin cancer patient's main complaint is his rash which is been present for a long time unable to be helped so far causes intense  itching he denies temperature chills sweats or myalgias denies headaches congestion sore throat cough wheezing pleurisy chest pain shortness of breath exertional dyspnea lightheadedness palpitations dizziness vertigo or syncope had no abdominal pain nausea vomiting diarrhea constipation bright red blood per rectum or black stools his appetite has been good his weight has been stable does have a diffuse body rash he does get up at night to urinate but denies dysuria or gross hematuria he has intermittent leg edema and depression and has been sleeping relatively well recently

## 2021-08-16 ENCOUNTER — APPOINTMENT (OUTPATIENT)
Dept: DERMATOLOGY | Facility: CLINIC | Age: 86
End: 2021-08-16
Payer: MEDICARE

## 2021-08-16 DIAGNOSIS — Z87.2 PERSONAL HISTORY OF DISEASES OF THE SKIN AND SUBCUTANEOUS TISSUE: ICD-10-CM

## 2021-08-16 PROCEDURE — 99204 OFFICE O/P NEW MOD 45 MIN: CPT | Mod: 25

## 2021-08-16 PROCEDURE — 11900 INJECT SKIN LESIONS </W 7: CPT

## 2021-08-26 ENCOUNTER — NON-APPOINTMENT (OUTPATIENT)
Age: 86
End: 2021-08-26

## 2021-09-14 ENCOUNTER — RX RENEWAL (OUTPATIENT)
Age: 86
End: 2021-09-14

## 2021-09-21 ENCOUNTER — LABORATORY RESULT (OUTPATIENT)
Age: 86
End: 2021-09-21

## 2021-09-21 ENCOUNTER — APPOINTMENT (OUTPATIENT)
Dept: DERMATOLOGY | Facility: CLINIC | Age: 86
End: 2021-09-21
Payer: MEDICARE

## 2021-09-21 VITALS — HEIGHT: 70 IN | BODY MASS INDEX: 22.9 KG/M2 | WEIGHT: 160 LBS

## 2021-09-21 DIAGNOSIS — D48.5 NEOPLASM OF UNCERTAIN BEHAVIOR OF SKIN: ICD-10-CM

## 2021-09-21 PROCEDURE — 11103 TANGNTL BX SKIN EA SEP/ADDL: CPT

## 2021-09-21 PROCEDURE — 11104 PUNCH BX SKIN SINGLE LESION: CPT

## 2021-09-21 PROCEDURE — 99214 OFFICE O/P EST MOD 30 MIN: CPT | Mod: 25

## 2021-09-28 ENCOUNTER — NON-APPOINTMENT (OUTPATIENT)
Age: 86
End: 2021-09-28

## 2021-10-07 ENCOUNTER — APPOINTMENT (OUTPATIENT)
Dept: DERMATOLOGY | Facility: CLINIC | Age: 86
End: 2021-10-07
Payer: MEDICARE

## 2021-10-07 VITALS — BODY MASS INDEX: 22.19 KG/M2 | WEIGHT: 155 LBS | HEIGHT: 70 IN

## 2021-10-07 PROCEDURE — 99214 OFFICE O/P EST MOD 30 MIN: CPT

## 2021-10-20 ENCOUNTER — APPOINTMENT (OUTPATIENT)
Dept: DERMATOLOGY | Facility: CLINIC | Age: 86
End: 2021-10-20
Payer: MEDICARE

## 2021-10-20 PROCEDURE — 99214 OFFICE O/P EST MOD 30 MIN: CPT

## 2021-10-25 ENCOUNTER — APPOINTMENT (OUTPATIENT)
Dept: DERMATOLOGY | Facility: CLINIC | Age: 86
End: 2021-10-25

## 2021-11-04 ENCOUNTER — APPOINTMENT (OUTPATIENT)
Dept: DERMATOLOGY | Facility: CLINIC | Age: 86
End: 2021-11-04
Payer: MEDICARE

## 2021-11-04 PROCEDURE — 99214 OFFICE O/P EST MOD 30 MIN: CPT

## 2021-11-10 ENCOUNTER — NON-APPOINTMENT (OUTPATIENT)
Age: 86
End: 2021-11-10

## 2021-11-10 LAB
ALBUMIN SERPL ELPH-MCNC: 4.5 G/DL
ALP BLD-CCNC: 95 U/L
ALT SERPL-CCNC: 9 U/L
ANION GAP SERPL CALC-SCNC: 14 MMOL/L
AST SERPL-CCNC: 18 U/L
BILIRUB SERPL-MCNC: 0.7 MG/DL
BUN SERPL-MCNC: 25 MG/DL
CALCIUM SERPL-MCNC: 10.1 MG/DL
CHLORIDE SERPL-SCNC: 104 MMOL/L
CO2 SERPL-SCNC: 23 MMOL/L
CREAT SERPL-MCNC: 1.19 MG/DL
GLUCOSE SERPL-MCNC: 81 MG/DL
POTASSIUM SERPL-SCNC: 4.5 MMOL/L
PROT SERPL-MCNC: 7.2 G/DL
SODIUM SERPL-SCNC: 140 MMOL/L
TRIGL SERPL-MCNC: 110 MG/DL

## 2021-11-16 ENCOUNTER — APPOINTMENT (OUTPATIENT)
Dept: DERMATOLOGY | Facility: CLINIC | Age: 86
End: 2021-11-16
Payer: MEDICARE

## 2021-11-16 ENCOUNTER — NON-APPOINTMENT (OUTPATIENT)
Age: 86
End: 2021-11-16

## 2021-11-16 PROCEDURE — 13132 CMPLX RPR F/C/C/M/N/AX/G/H/F: CPT

## 2021-11-16 PROCEDURE — 17311 MOHS 1 STAGE H/N/HF/G: CPT

## 2021-11-16 PROCEDURE — 17312 MOHS ADDL STAGE: CPT

## 2021-12-03 NOTE — CURRENT MEDS
Procedure: lung biopsy  Date of procedure: TBD  Location:  [facility]  Surgeon: Jaret Penny MD  ANES: moderate sedation  Type of clearance: medication clearance  Last seen: 09/07/2021 Dr. Moy  Next visit: 03/04/2022 Dr. Moy   [Takes medication as prescribed] : takes

## 2021-12-08 ENCOUNTER — APPOINTMENT (OUTPATIENT)
Dept: ORTHOPEDIC SURGERY | Facility: CLINIC | Age: 86
End: 2021-12-08
Payer: MEDICARE

## 2021-12-08 VITALS — HEIGHT: 70 IN | BODY MASS INDEX: 21.47 KG/M2 | WEIGHT: 150 LBS

## 2021-12-08 DIAGNOSIS — Z96.642 PRESENCE OF LEFT ARTIFICIAL HIP JOINT: ICD-10-CM

## 2021-12-08 PROCEDURE — 99212 OFFICE O/P EST SF 10 MIN: CPT

## 2021-12-08 PROCEDURE — 73502 X-RAY EXAM HIP UNI 2-3 VIEWS: CPT | Mod: LT

## 2021-12-08 NOTE — PHYSICAL EXAM
[de-identified] : Well-nourished, in no acute distress\par Alert and oriented to time, place and person\par Skin: no lesions discoloration\par Respirations: unlabored\par Cardiac: no leg swelling\par Lymphatic: no groin adenopathy\par left hip: flexion 110 degrees , internal 5 degrees , external 40 degrees , abduction 40 degrees , adduction 25 degrees SATISFActory gait nv intact  Pain-free resisted left hip flexion 5/5 pain-free resisted left knee extension 5/5 pain-free no local tenderness\par  [de-identified] : X-rays AP pelvis left hip AP lateral reveals satisfactory postop appearance left total hip replacement components and global alignment no interval change

## 2021-12-20 ENCOUNTER — APPOINTMENT (OUTPATIENT)
Dept: DERMATOLOGY | Facility: CLINIC | Age: 86
End: 2021-12-20
Payer: MEDICARE

## 2021-12-20 PROCEDURE — 99214 OFFICE O/P EST MOD 30 MIN: CPT

## 2022-03-07 ENCOUNTER — APPOINTMENT (OUTPATIENT)
Dept: DERMATOLOGY | Facility: CLINIC | Age: 87
End: 2022-03-07
Payer: MEDICARE

## 2022-03-07 PROCEDURE — 99214 OFFICE O/P EST MOD 30 MIN: CPT

## 2022-06-09 ENCOUNTER — APPOINTMENT (OUTPATIENT)
Dept: DERMATOLOGY | Facility: CLINIC | Age: 87
End: 2022-06-09
Payer: MEDICARE

## 2022-06-09 DIAGNOSIS — D48.5 NEOPLASM OF UNCERTAIN BEHAVIOR OF SKIN: ICD-10-CM

## 2022-06-09 PROCEDURE — 99214 OFFICE O/P EST MOD 30 MIN: CPT

## 2022-09-12 ENCOUNTER — NON-APPOINTMENT (OUTPATIENT)
Age: 87
End: 2022-09-12

## 2022-09-13 ENCOUNTER — RX RENEWAL (OUTPATIENT)
Age: 87
End: 2022-09-13

## 2022-09-15 ENCOUNTER — APPOINTMENT (OUTPATIENT)
Dept: DERMATOLOGY | Facility: CLINIC | Age: 87
End: 2022-09-15

## 2022-09-15 PROCEDURE — 99213 OFFICE O/P EST LOW 20 MIN: CPT

## 2022-12-15 ENCOUNTER — APPOINTMENT (OUTPATIENT)
Dept: DERMATOLOGY | Facility: CLINIC | Age: 87
End: 2022-12-15

## 2022-12-15 ENCOUNTER — LABORATORY RESULT (OUTPATIENT)
Age: 87
End: 2022-12-15

## 2022-12-15 DIAGNOSIS — D49.2 NEOPLASM OF UNSPECIFIED BEHAVIOR OF BONE, SOFT TISSUE, AND SKIN: ICD-10-CM

## 2022-12-15 PROCEDURE — 11104 PUNCH BX SKIN SINGLE LESION: CPT

## 2022-12-15 PROCEDURE — 99214 OFFICE O/P EST MOD 30 MIN: CPT | Mod: 25

## 2023-01-02 ENCOUNTER — NON-APPOINTMENT (OUTPATIENT)
Age: 88
End: 2023-01-02

## 2023-03-06 ENCOUNTER — APPOINTMENT (OUTPATIENT)
Dept: DERMATOLOGY | Facility: CLINIC | Age: 88
End: 2023-03-06
Payer: MEDICARE

## 2023-03-06 DIAGNOSIS — L57.0 ACTINIC KERATOSIS: ICD-10-CM

## 2023-03-06 PROCEDURE — 99214 OFFICE O/P EST MOD 30 MIN: CPT

## 2023-03-15 LAB
25(OH)D3 SERPL-MCNC: 35.5 NG/ML
ALBUMIN SERPL ELPH-MCNC: 4.1 G/DL
ALP BLD-CCNC: 90 U/L
ALT SERPL-CCNC: 12 U/L
ANION GAP SERPL CALC-SCNC: 10 MMOL/L
APPEARANCE: CLEAR
AST SERPL-CCNC: 22 U/L
BASOPHILS # BLD AUTO: 0.05 K/UL
BASOPHILS NFR BLD AUTO: 0.8 %
BILIRUB SERPL-MCNC: 0.8 MG/DL
BILIRUBIN URINE: NEGATIVE
BLOOD URINE: NEGATIVE
BUN SERPL-MCNC: 25 MG/DL
CALCIUM SERPL-MCNC: 10.3 MG/DL
CHLORIDE SERPL-SCNC: 106 MMOL/L
CHOLEST SERPL-MCNC: 155 MG/DL
CO2 SERPL-SCNC: 25 MMOL/L
COLOR: YELLOW
CREAT SERPL-MCNC: 1.29 MG/DL
EGFR: 52 ML/MIN/1.73M2
EOSINOPHIL # BLD AUTO: 0.77 K/UL
EOSINOPHIL NFR BLD AUTO: 11.7 %
ESTIMATED AVERAGE GLUCOSE: 114 MG/DL
GLUCOSE BS SERPL-MCNC: 96 MG/DL
GLUCOSE QUALITATIVE U: NEGATIVE
GLUCOSE SERPL-MCNC: 99 MG/DL
HBA1C MFR BLD HPLC: 5.6 %
HCT VFR BLD CALC: 43.7 %
HDLC SERPL-MCNC: 49 MG/DL
HGB BLD-MCNC: 14.2 G/DL
IMM GRANULOCYTES NFR BLD AUTO: 0.3 %
KETONES URINE: NEGATIVE
LDLC SERPL CALC-MCNC: 96 MG/DL
LEUKOCYTE ESTERASE URINE: NEGATIVE
LYMPHOCYTES # BLD AUTO: 1.43 K/UL
LYMPHOCYTES NFR BLD AUTO: 21.7 %
MAN DIFF?: NORMAL
MCHC RBC-ENTMCNC: 30.2 PG
MCHC RBC-ENTMCNC: 32.5 GM/DL
MCV RBC AUTO: 93 FL
MONOCYTES # BLD AUTO: 0.76 K/UL
MONOCYTES NFR BLD AUTO: 11.6 %
NEUTROPHILS # BLD AUTO: 3.55 K/UL
NEUTROPHILS NFR BLD AUTO: 53.9 %
NITRITE URINE: NEGATIVE
NONHDLC SERPL-MCNC: 106 MG/DL
PH URINE: 6
PLATELET # BLD AUTO: 154 K/UL
POTASSIUM SERPL-SCNC: 4.6 MMOL/L
PROT SERPL-MCNC: 6.9 G/DL
PROTEIN URINE: NORMAL
PSA FREE FLD-MCNC: 29 %
PSA FREE SERPL-MCNC: 0.8 NG/ML
PSA SERPL-MCNC: 2.77 NG/ML
RBC # BLD: 4.7 M/UL
RBC # FLD: 13.5 %
SODIUM SERPL-SCNC: 141 MMOL/L
SPECIFIC GRAVITY URINE: 1.02
T4 FREE SERPL-MCNC: 1.2 NG/DL
TRIGL SERPL-MCNC: 48 MG/DL
TSH SERPL-ACNC: 4.56 UIU/ML
UROBILINOGEN URINE: NORMAL
VIT B12 SERPL-MCNC: 373 PG/ML
WBC # FLD AUTO: 6.58 K/UL

## 2023-03-20 ENCOUNTER — NON-APPOINTMENT (OUTPATIENT)
Age: 88
End: 2023-03-20

## 2023-03-20 ENCOUNTER — APPOINTMENT (OUTPATIENT)
Dept: INTERNAL MEDICINE | Facility: CLINIC | Age: 88
End: 2023-03-20
Payer: MEDICARE

## 2023-03-20 VITALS
HEART RATE: 84 BPM | SYSTOLIC BLOOD PRESSURE: 126 MMHG | OXYGEN SATURATION: 97 % | HEIGHT: 70 IN | BODY MASS INDEX: 23.34 KG/M2 | RESPIRATION RATE: 16 BRPM | WEIGHT: 163 LBS | TEMPERATURE: 97.5 F | DIASTOLIC BLOOD PRESSURE: 68 MMHG

## 2023-03-20 DIAGNOSIS — C44.90 UNSPECIFIED MALIGNANT NEOPLASM OF SKIN, UNSPECIFIED: ICD-10-CM

## 2023-03-20 DIAGNOSIS — L11.1 TRANSIENT ACANTHOLYTIC DERMATOSIS [GROVER]: ICD-10-CM

## 2023-03-20 DIAGNOSIS — M19.90 UNSPECIFIED OSTEOARTHRITIS, UNSPECIFIED SITE: ICD-10-CM

## 2023-03-20 DIAGNOSIS — R00.2 PALPITATIONS: ICD-10-CM

## 2023-03-20 DIAGNOSIS — C44.202 UNSPECIFIED MALIGNANT NEOPLASM OF SKIN OF RIGHT EAR AND EXTERNAL AURICULAR CANAL: ICD-10-CM

## 2023-03-20 DIAGNOSIS — N40.0 BENIGN PROSTATIC HYPERPLASIA WITHOUT LOWER URINARY TRACT SYMPMS: ICD-10-CM

## 2023-03-20 PROCEDURE — G0439: CPT

## 2023-03-20 PROCEDURE — 93000 ELECTROCARDIOGRAM COMPLETE: CPT | Mod: 59

## 2023-03-20 RX ORDER — BETAMETHASONE DIPROPIONATE 0.5 MG/G
0.05 OINTMENT TOPICAL TWICE DAILY
Qty: 1 | Refills: 0 | Status: DISCONTINUED | COMMUNITY
Start: 2021-10-07 | End: 2023-03-20

## 2023-03-20 RX ORDER — ELTROMBOPAG OLAMINE 25 MG/1
25 TABLET, FILM COATED ORAL
Qty: 30 | Refills: 0 | Status: DISCONTINUED | COMMUNITY
Start: 2019-02-12 | End: 2023-03-20

## 2023-03-20 RX ORDER — CLOBETASOL PROPIONATE 0.5 MG/G
0.05 CREAM TOPICAL 3 TIMES DAILY
Qty: 2 | Refills: 11 | Status: DISCONTINUED | COMMUNITY
Start: 2021-01-13 | End: 2023-03-20

## 2023-03-20 RX ORDER — TRIAMCINOLONE ACETONIDE 1 MG/G
0.1 OINTMENT TOPICAL
Qty: 1 | Refills: 0 | Status: DISCONTINUED | COMMUNITY
Start: 2021-09-21 | End: 2023-03-20

## 2023-03-23 ENCOUNTER — NON-APPOINTMENT (OUTPATIENT)
Age: 88
End: 2023-03-23

## 2023-03-23 ENCOUNTER — APPOINTMENT (OUTPATIENT)
Dept: CARDIOLOGY | Facility: CLINIC | Age: 88
End: 2023-03-23
Payer: MEDICARE

## 2023-03-23 VITALS — DIASTOLIC BLOOD PRESSURE: 60 MMHG | HEART RATE: 80 BPM | SYSTOLIC BLOOD PRESSURE: 118 MMHG

## 2023-03-23 VITALS
OXYGEN SATURATION: 97 % | WEIGHT: 159 LBS | HEIGHT: 70 IN | TEMPERATURE: 97.4 F | HEART RATE: 75 BPM | BODY MASS INDEX: 22.76 KG/M2 | RESPIRATION RATE: 17 BRPM

## 2023-03-23 PROCEDURE — 93000 ELECTROCARDIOGRAM COMPLETE: CPT

## 2023-03-23 PROCEDURE — 99204 OFFICE O/P NEW MOD 45 MIN: CPT

## 2023-03-23 NOTE — REASON FOR VISIT
[Arrhythmia/ECG Abnorrmalities] : arrhythmia/ECG abnormalities [FreeTextEntry1] : This is a 89-year-old man referred for cardiac evaluation.  Since his  last cardiogram in 2020 which revealed a normal QRS he has developed a left bundle branch block on EKG. as well and has no complaints of chest discomfort shortness of breath palpitations dizziness or syncope.  He has no known history of coronary events review of past records reveals that he does have a history of mild mitral and moderate to severe aortic insufficiency.  Vasodilator myocardial perfusion imaging in 2019 was negative for ischemia

## 2023-03-23 NOTE — ASSESSMENT
[FreeTextEntry1] : In summary, the patient is an elderly man with a new onset of left bundle branch block.\par \par Given his age it is very possible that this is simply a reflection of a breakdown with deterioration in his conduction system.\par \par We will get echocardiography to assess for any new wall motion abnormalities that could be indicative of a possible coronary event causing his bundle branch block pattern but given his prior work-up and lack of symptoms of any coronary event this is less likely.  Currently if there is any evidence of left ventricular systolic deterioration or worsening of his aortic insufficiency other options could be considered as well\par \par I had a long discussion with the patient regarding the concept of conduction abnormalities and explained to him that there is a possibility that in the future should his conduction system breakdown further he might require a pacemaker

## 2023-03-23 NOTE — PHYSICAL EXAM
[5th Left ICS - MCL] : palpated at the 5th LICS in the midclavicular line [Normal] : normal [Normal Rate] : normal [Rhythm Regular] : regular [Normal S1] : normal S1 [Normal S2] : normal S2 [II] : a grade 2 [III] : a grade 3

## 2023-03-30 ENCOUNTER — NON-APPOINTMENT (OUTPATIENT)
Age: 88
End: 2023-03-30

## 2023-03-30 ENCOUNTER — APPOINTMENT (OUTPATIENT)
Dept: CARDIOLOGY | Facility: CLINIC | Age: 88
End: 2023-03-30
Payer: MEDICARE

## 2023-03-30 PROCEDURE — 93306 TTE W/DOPPLER COMPLETE: CPT

## 2023-03-30 PROCEDURE — 99213 OFFICE O/P EST LOW 20 MIN: CPT

## 2023-03-30 PROCEDURE — 93000 ELECTROCARDIOGRAM COMPLETE: CPT

## 2023-03-30 NOTE — ASSESSMENT
[FreeTextEntry1] : Impression:\par 1.  Patient with new onset left bundle branch block pattern has the possibility of a mild cardiomyopathy\par \par Plan:\par 1.  Have decided to begin patient on low-dose ARB with losartan 25 mg/day\par 2.  Patient have renal profile in 1 month's time\par 3.  Return for follow-up as scheduled

## 2023-03-30 NOTE — REASON FOR VISIT
[Symptom and Test Evaluation] : symptom and test evaluation [Arrhythmia/ECG Abnorrmalities] : arrhythmia/ECG abnormalities [FreeTextEntry1] : Patient requested evaluation following his echocardiogram.  The echo revealed mild left ventricular systolic dysfunction possibly on the basis of the left bundle branch block pattern.  He continues to have significant aortic insufficiency.  The patient denies chest discomfort shortness of breath palpitations dizziness or syncope.

## 2023-04-11 VITALS
SYSTOLIC BLOOD PRESSURE: 120 MMHG | HEART RATE: 80 BPM | HEIGHT: 70 IN | DIASTOLIC BLOOD PRESSURE: 60 MMHG | WEIGHT: 159 LBS | BODY MASS INDEX: 22.76 KG/M2 | TEMPERATURE: 97.8 F | RESPIRATION RATE: 16 BRPM | OXYGEN SATURATION: 97 %

## 2023-04-11 PROBLEM — N40.0 BPH (BENIGN PROSTATIC HYPERPLASIA): Status: ACTIVE | Noted: 2018-04-23

## 2023-04-11 PROBLEM — C44.202 CANCER OF SKIN OF RIGHT EAR: Status: ACTIVE | Noted: 2018-09-27

## 2023-04-11 PROBLEM — C44.90 SKIN CANCER: Status: ACTIVE | Noted: 2021-01-14

## 2023-04-11 PROBLEM — M19.90 OSTEOARTHRITIS: Status: ACTIVE | Noted: 2020-10-29

## 2023-04-11 PROBLEM — R00.2 PALPITATIONS: Status: ACTIVE | Noted: 2020-10-22

## 2023-04-11 NOTE — ASSESSMENT
[FreeTextEntry1] : Physical examination reveals a well-developed elderly man in no acute distress blood pressure 126/60 height 5 feet 10 inches weight 159 pounds BMI 22.81 temperature 97.8 °F heart rate of 80 respirations 16 oxygen saturation on room air 97% HEENT was unremarkable chest was clear cardiovascular exam was regular there was a grade 2 systolic murmur best heard in the right upper sternal border and left lower sternal border abdomen was soft extremities showed trace lower extremity edema neurologic exam was nonfocal patient recently seeing a cardiologist to evaluate an abnormal EKG echocardiogram to be performed on March 30, 2023 patient previously followed with a hematologist for thrombocytopenia which has disappeared blood test were performed CBC showing an elevated eosinophil count hemoglobin A1c 5.6% PSA 2.77 vitamin D 35.5 cholesterol 155 triglycerides 48 HDL 49 LDL 96 BUN 25 follows regularly with his ophthalmologist and dermatologist for his Grovers disease and history of skin cancers EKG showed a left bundle branch block which is new since previous EKG cardiology referral has been made patient defers on colorectal cancer screening blood pressure was adequately controlled at the present visit

## 2023-04-11 NOTE — REVIEW OF SYSTEMS
[Lower Ext Edema] : lower extremity edema [Nocturia] : nocturia [Frequency] : frequency [Impotence] : impotence [Poor Libido] : poor libido [Joint Pain] : joint pain [Joint Stiffness] : joint stiffness [Back Pain] : back pain [Fever] : no fever [Chills] : no chills [Fatigue] : no fatigue [Hot Flashes] : no hot flashes [Night Sweats] : no night sweats [Recent Change In Weight] : ~T no recent weight change [Discharge] : no discharge [Pain] : no pain [Redness] : no redness [Dryness] : no dryness [Vision Problems] : no vision problems [Itching] : no itching [Earache] : no earache [Hearing Loss] : no hearing loss [Nosebleeds] : no nosebleeds [Postnasal Drip] : no postnasal drip [Nasal Discharge] : no nasal discharge [Sore Throat] : no sore throat [Hoarseness] : no hoarseness [Chest Pain] : no chest pain [Claudication] : no  leg claudication [Palpitations] : no palpitations [Orthopena] : no orthopnea [Paroxysmal Nocturnal Dyspnea] : no paroxysmal nocturnal dyspnea [Shortness Of Breath] : no shortness of breath [Wheezing] : no wheezing [Dyspnea on Exertion] : not dyspnea on exertion [Cough] : no cough [Abdominal Pain] : no abdominal pain [Nausea] : no nausea [Constipation] : no constipation [Diarrhea] : no diarrhea [Heartburn] : no heartburn [Vomiting] : no vomiting [Melena] : no melena [Dysuria] : no dysuria [Incontinence] : no incontinence [Hesitancy] : no hesitancy [Hematuria] : no hematuria [Muscle Weakness] : no muscle weakness [Muscle Pain] : no muscle pain [Joint Swelling] : no joint swelling [Mole Changes] : no mole changes [Nail Changes] : no nail changes [Hair Changes] : no hair changes [Skin Rash] : no skin rash [Headache] : no headache [Dizziness] : no dizziness [Fainting] : no fainting [Confusion] : no confusion [Unsteady Walk] : no ataxia [Memory Loss] : no memory loss [Suicidal] : not suicidal [Insomnia] : no insomnia [Anxiety] : no anxiety [Depression] : no depression [Easy Bleeding] : no easy bleeding [Easy Bruising] : no easy bruising

## 2023-04-11 NOTE — HISTORY OF PRESENT ILLNESS
[Spouse] : spouse [FreeTextEntry1] : 89-year-old man comes to the office for a comprehensive physical examination to review his medications and discuss his overall health Chief complaints dealing with frequent urination and nocturia and back pain [de-identified] : Comes to the office for a comprehensive physical examination with a history of Grovers disease skin cancers benign prostatic hyperplasia hypertension osteoarthritis in multiple locations aortic valve insufficiency cardiomyopathy review of systems is significant for nocturia and urinary frequency mild lower extremity edema previously did not go back pain and joint pains remaining review of systems is noncontributory

## 2023-04-11 NOTE — PHYSICAL EXAM
[No Acute Distress] : no acute distress [Well Nourished] : well nourished [Well Developed] : well developed [Well-Appearing] : well-appearing [Normal Voice/Communication] : normal voice/communication [Normal Sclera/Conjunctiva] : normal sclera/conjunctiva [PERRL] : pupils equal round and reactive to light [EOMI] : extraocular movements intact [Normal Outer Ear/Nose] : the outer ears and nose were normal in appearance [Normal Oropharynx] : the oropharynx was normal [Normal TMs] : both tympanic membranes were normal [Normal Nasal Mucosa] : the nasal mucosa was normal [No JVD] : no jugular venous distention [No Lymphadenopathy] : no lymphadenopathy [Supple] : supple [Thyroid Normal, No Nodules] : the thyroid was normal and there were no nodules present [No Respiratory Distress] : no respiratory distress  [No Accessory Muscle Use] : no accessory muscle use [Clear to Auscultation] : lungs were clear to auscultation bilaterally [Normal Percussion] : the chest was normal to percussion [Normal Rate] : normal rate  [Regular Rhythm] : with a regular rhythm [Normal S1, S2] : normal S1 and S2 [No Murmur] : no murmur heard [No Carotid Bruits] : no carotid bruits [No Abdominal Bruit] : a ~M bruit was not heard ~T in the abdomen [No Varicosities] : no varicosities [Pedal Pulses Present] : the pedal pulses are present [No Edema] : there was no peripheral edema [No Palpable Aorta] : no palpable aorta [No Extremity Clubbing/Cyanosis] : no extremity clubbing/cyanosis [Declined Breast Exam] : declined breast exam  [Soft] : abdomen soft [Non Tender] : non-tender [Non-distended] : non-distended [No Masses] : no abdominal mass palpated [No HSM] : no HSM [Normal Bowel Sounds] : normal bowel sounds [No Hernias] : no hernias [Declined Rectal Exam] : declined rectal exam [No CVA Tenderness] : no CVA  tenderness [No Spinal Tenderness] : no spinal tenderness [No Joint Swelling] : no joint swelling [Grossly Normal Strength/Tone] : grossly normal strength/tone [No Rash] : no rash [No Skin Lesions] : no skin lesions [Coordination Grossly Intact] : coordination grossly intact [No Focal Deficits] : no focal deficits [Normal Gait] : normal gait [Deep Tendon Reflexes (DTR)] : deep tendon reflexes were 2+ and symmetric [Speech Grossly Normal] : speech grossly normal [Memory Grossly Normal] : memory grossly normal [Normal Affect] : the affect was normal [Alert and Oriented x3] : oriented to person, place, and time [Normal Mood] : the mood was normal [Normal Insight/Judgement] : insight and judgment were intact [Kyphosis] : no kyphosis [Scoliosis] : no scoliosis [Acne] : no acne

## 2023-04-11 NOTE — HEALTH RISK ASSESSMENT
[Yes] : Yes [No falls in past year] : Patient reported no falls in the past year [0] : 2) Feeling down, depressed, or hopeless: Not at all (0) [PHQ-2 Negative - No further assessment needed] : PHQ-2 Negative - No further assessment needed [Never] : Never [de-identified] : Social [[id number]] : 0

## 2023-04-29 ENCOUNTER — LABORATORY RESULT (OUTPATIENT)
Age: 88
End: 2023-04-29

## 2023-06-05 ENCOUNTER — APPOINTMENT (OUTPATIENT)
Dept: DERMATOLOGY | Facility: CLINIC | Age: 88
End: 2023-06-05

## 2023-07-10 ENCOUNTER — APPOINTMENT (OUTPATIENT)
Dept: DERMATOLOGY | Facility: CLINIC | Age: 88
End: 2023-07-10
Payer: MEDICARE

## 2023-07-10 PROCEDURE — 99214 OFFICE O/P EST MOD 30 MIN: CPT

## 2023-07-10 NOTE — HISTORY OF PRESENT ILLNESS
[FreeTextEntry1] : f/u dermatitis [de-identified] : 89M with a hx of multiple NMSC here for f/u dermatitis. At , patient was using triamcinolone cream with improvement; does not use it regularly. 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 Does not moisturize. Here today with his wife to discuss alternative treatment options. \par \par Pt is s/p Mohs surgery 11/2021 to [facility] to [facility] w/Dr. Cerna (3 stages), w/incidental focus of BCC noted during Mohs surgery on lateral edge of stage 3, discussed possible Efudex to area, pt did not tolerate surgery well. \par Also with HAK on the LLE diagnosed 9/2021\par \par Also has other likely NMSC clinically diagnosed at prior visits. Has elected to clinically monitor these lesions

## 2023-07-10 NOTE — PHYSICAL EXAM
[Alert] : alert [Oriented x 3] : ~L oriented x 3 [Well Nourished] : well nourished [Conjunctiva Non-injected] : conjunctiva non-injected [No Visual Lymphadenopathy] : no visual  lymphadenopathy [No Clubbing] : no clubbing [No Edema] : no edema [No Bromhidrosis] : no bromhidrosis [No Chromhidrosis] : no chromhidrosis [FreeTextEntry3] : Edematous erythematous papules and plaques on the abdomen and lower back. No true collarettes of scale or erosions\par

## 2023-07-12 ENCOUNTER — APPOINTMENT (OUTPATIENT)
Dept: INTERNAL MEDICINE | Facility: CLINIC | Age: 88
End: 2023-07-12
Payer: MEDICARE

## 2023-07-12 VITALS
WEIGHT: 155 LBS | BODY MASS INDEX: 22.19 KG/M2 | RESPIRATION RATE: 16 BRPM | OXYGEN SATURATION: 100 % | HEIGHT: 70 IN | DIASTOLIC BLOOD PRESSURE: 68 MMHG | SYSTOLIC BLOOD PRESSURE: 118 MMHG | TEMPERATURE: 97.6 F | HEART RATE: 78 BPM

## 2023-07-12 DIAGNOSIS — N18.32 CHRONIC KIDNEY DISEASE, STAGE 3B: ICD-10-CM

## 2023-07-12 DIAGNOSIS — N18.30 CHRONIC KIDNEY DISEASE, STAGE 3 UNSPECIFIED: ICD-10-CM

## 2023-07-12 PROCEDURE — 99204 OFFICE O/P NEW MOD 45 MIN: CPT

## 2023-07-17 ENCOUNTER — APPOINTMENT (OUTPATIENT)
Dept: CARDIOLOGY | Facility: CLINIC | Age: 88
End: 2023-07-17
Payer: MEDICARE

## 2023-07-17 ENCOUNTER — NON-APPOINTMENT (OUTPATIENT)
Age: 88
End: 2023-07-17

## 2023-07-17 VITALS — SYSTOLIC BLOOD PRESSURE: 122 MMHG | HEART RATE: 80 BPM | DIASTOLIC BLOOD PRESSURE: 60 MMHG

## 2023-07-17 VITALS — OXYGEN SATURATION: 96 % | BODY MASS INDEX: 22.62 KG/M2 | HEIGHT: 70 IN | WEIGHT: 158 LBS | HEART RATE: 70 BPM

## 2023-07-17 PROCEDURE — 99214 OFFICE O/P EST MOD 30 MIN: CPT

## 2023-07-17 PROCEDURE — 93000 ELECTROCARDIOGRAM COMPLETE: CPT

## 2023-07-17 RX ORDER — LOSARTAN POTASSIUM 25 MG/1
25 TABLET, FILM COATED ORAL
Qty: 90 | Refills: 3 | Status: DISCONTINUED | COMMUNITY
Start: 2023-03-30 | End: 2023-07-17

## 2023-07-18 NOTE — ASSESSMENT
[FreeTextEntry1] : Impression:\par 1.  Patient with  left bundle branch block pattern has the possibility of a mild cardiomyopathy.  He is hemodynamically stable at this time and without signs or symptoms of congestive heart failure.  Given the fact that he has renal insufficiency discontinuation of losartan is appropriate.  Given the fact that he appears hemodynamically stable at this time and is complaining of dizziness his dizziness is most probably not cardiac related\par \par Plan:\par 1.  Continue current regimen\par 2.  Have told patient that we will attempt to help him get an earlier neurologic evaluation

## 2023-07-18 NOTE — REASON FOR VISIT
[Arrhythmia/ECG Abnorrmalities] : arrhythmia/ECG abnormalities [Other: ____] : [unfilled] [FreeTextEntry1] : Patient returns for followup. Feeling well. Offers no complaints of chest discomfort shortness of breath palpitations or syncope.  Does however complain of dizziness and states he is even dizzy right now talking to me.  He is unable to state whether the dizziness is worse with moving his head he has discontinued his losartan due to renal issues.  He is concerned over his left bundle branch block pattern.  \par

## 2023-07-25 ENCOUNTER — NON-APPOINTMENT (OUTPATIENT)
Age: 88
End: 2023-07-25

## 2023-07-25 PROCEDURE — 93224 XTRNL ECG REC UP TO 48 HRS: CPT

## 2023-08-17 ENCOUNTER — LABORATORY RESULT (OUTPATIENT)
Age: 88
End: 2023-08-17

## 2023-08-17 ENCOUNTER — APPOINTMENT (OUTPATIENT)
Dept: DERMATOLOGY | Facility: CLINIC | Age: 88
End: 2023-08-17
Payer: MEDICARE

## 2023-08-17 DIAGNOSIS — L57.0 ACTINIC KERATOSIS: ICD-10-CM

## 2023-08-17 DIAGNOSIS — D48.5 NEOPLASM OF UNCERTAIN BEHAVIOR OF SKIN: ICD-10-CM

## 2023-08-17 DIAGNOSIS — C44.319 BASAL CELL CARCINOMA OF SKIN OF OTHER PARTS OF FACE: ICD-10-CM

## 2023-08-17 DIAGNOSIS — L82.0 INFLAMED SEBORRHEIC KERATOSIS: ICD-10-CM

## 2023-08-17 DIAGNOSIS — D04.39 CARCINOMA IN SITU OF SKIN OF OTHER PARTS OF FACE: ICD-10-CM

## 2023-08-17 PROCEDURE — 17003 DESTRUCT PREMALG LES 2-14: CPT | Mod: 59

## 2023-08-17 PROCEDURE — 17110 DESTRUCTION B9 LES UP TO 14: CPT

## 2023-08-17 PROCEDURE — 11102 TANGNTL BX SKIN SINGLE LES: CPT | Mod: 59

## 2023-08-17 PROCEDURE — 99214 OFFICE O/P EST MOD 30 MIN: CPT | Mod: 25

## 2023-08-17 PROCEDURE — 17000 DESTRUCT PREMALG LESION: CPT | Mod: 59

## 2023-08-17 NOTE — PHYSICAL EXAM
[Alert] : alert [Oriented x 3] : ~L oriented x 3 [Well Nourished] : well nourished [Conjunctiva Non-injected] : conjunctiva non-injected [No Visual Lymphadenopathy] : no visual  lymphadenopathy [No Clubbing] : no clubbing [No Edema] : no edema [No Bromhidrosis] : no bromhidrosis [No Chromhidrosis] : no chromhidrosis [FreeTextEntry3] : Scaly erythematous papule on the R cheek Stuck-on brown papule on the L shoulder R chest with ulcerated plaque Trunk with mild erythema with significant background xerosis

## 2023-08-17 NOTE — HISTORY OF PRESENT ILLNESS
[FreeTextEntry1] : f/u dermatitis [de-identified] : 89M with a hx of multiple NMSC here for f/u dermatitis. At , patient was started on low dose prednisone 10mg QD with significant improvement in itching. Not using TAC cream or moisturizer.   Also notes an area on the R chest that is not healing as well as bumps on the face, R dorsal hand and L shoulder  Pt is s/p Mohs surgery 11/2021 to [facility] to L [facility] w/Dr. Cerna (3 stages), w/incidental focus of BCC noted during Mohs surgery on lateral edge of stage 3, discussed possible Efudex to area, pt did not tolerate surgery well.  Also with HAK on the LLE diagnosed 9/2021  Also has other likely NMSC clinically diagnosed at prior visits. Has elected to clinically monitor these lesions

## 2023-08-23 ENCOUNTER — APPOINTMENT (OUTPATIENT)
Dept: INTERNAL MEDICINE | Facility: CLINIC | Age: 88
End: 2023-08-23
Payer: MEDICARE

## 2023-08-23 VITALS
WEIGHT: 160 LBS | DIASTOLIC BLOOD PRESSURE: 66 MMHG | HEART RATE: 57 BPM | OXYGEN SATURATION: 99 % | RESPIRATION RATE: 16 BRPM | BODY MASS INDEX: 22.9 KG/M2 | HEIGHT: 70 IN | SYSTOLIC BLOOD PRESSURE: 142 MMHG | TEMPERATURE: 97 F

## 2023-08-23 LAB
25(OH)D3 SERPL-MCNC: 38.6 NG/ML
ALBUMIN SERPL ELPH-MCNC: 4 G/DL
ANION GAP SERPL CALC-SCNC: 9 MMOL/L
BUN SERPL-MCNC: 24 MG/DL
CALCIUM SERPL-MCNC: 10 MG/DL
CALCIUM SERPL-MCNC: 9.9 MG/DL
CHLORIDE SERPL-SCNC: 103 MMOL/L
CO2 SERPL-SCNC: 24 MMOL/L
CREAT SERPL-MCNC: 1.34 MG/DL
CREAT SPEC-SCNC: 103 MG/DL
CREAT/PROT UR: 0.1 RATIO
EGFR: 49 ML/MIN/1.73M2
GLUCOSE SERPL-MCNC: 92 MG/DL
PARATHYROID HORMONE INTACT: 90 PG/ML
PHOSPHATE SERPL-MCNC: 2.6 MG/DL
POTASSIUM SERPL-SCNC: 4.3 MMOL/L
PROT UR-MCNC: 7 MG/DL
PSA FREE FLD-MCNC: 24 %
PSA FREE SERPL-MCNC: 0.59 NG/ML
PSA SERPL-MCNC: 2.47 NG/ML
SODIUM SERPL-SCNC: 137 MMOL/L

## 2023-08-23 PROCEDURE — 99213 OFFICE O/P EST LOW 20 MIN: CPT

## 2023-08-23 RX ORDER — TRIAMCINOLONE ACETONIDE 1 MG/G
0.1 CREAM TOPICAL TWICE DAILY
Qty: 1 | Refills: 1 | Status: DISCONTINUED | COMMUNITY
Start: 2022-03-07 | End: 2023-08-23

## 2023-08-30 ENCOUNTER — NON-APPOINTMENT (OUTPATIENT)
Age: 88
End: 2023-08-30

## 2023-09-26 ENCOUNTER — APPOINTMENT (OUTPATIENT)
Dept: NEUROLOGY | Facility: CLINIC | Age: 88
End: 2023-09-26

## 2023-10-05 ENCOUNTER — APPOINTMENT (OUTPATIENT)
Dept: NEPHROLOGY | Facility: CLINIC | Age: 88
End: 2023-10-05
Payer: MEDICARE

## 2023-10-05 ENCOUNTER — OUTPATIENT (OUTPATIENT)
Dept: OUTPATIENT SERVICES | Facility: HOSPITAL | Age: 88
LOS: 1 days | End: 2023-10-05
Payer: MEDICARE

## 2023-10-05 ENCOUNTER — APPOINTMENT (OUTPATIENT)
Dept: ULTRASOUND IMAGING | Facility: HOSPITAL | Age: 88
End: 2023-10-05
Payer: MEDICARE

## 2023-10-05 VITALS
TEMPERATURE: 97.7 F | OXYGEN SATURATION: 97 % | HEIGHT: 70 IN | BODY MASS INDEX: 22.72 KG/M2 | WEIGHT: 158.73 LBS | DIASTOLIC BLOOD PRESSURE: 71 MMHG | SYSTOLIC BLOOD PRESSURE: 130 MMHG | HEART RATE: 82 BPM

## 2023-10-05 VITALS — DIASTOLIC BLOOD PRESSURE: 70 MMHG | SYSTOLIC BLOOD PRESSURE: 132 MMHG

## 2023-10-05 DIAGNOSIS — Z98.890 OTHER SPECIFIED POSTPROCEDURAL STATES: Chronic | ICD-10-CM

## 2023-10-05 DIAGNOSIS — N18.32 CHRONIC KIDNEY DISEASE, STAGE 3B: ICD-10-CM

## 2023-10-05 DIAGNOSIS — N18.4 CHRONIC KIDNEY DISEASE, STAGE 4 (SEVERE): ICD-10-CM

## 2023-10-05 PROCEDURE — 76770 US EXAM ABDO BACK WALL COMP: CPT

## 2023-10-05 PROCEDURE — 76770 US EXAM ABDO BACK WALL COMP: CPT | Mod: 26

## 2023-10-05 PROCEDURE — 99205 OFFICE O/P NEW HI 60 MIN: CPT

## 2023-10-10 LAB
ALBUMIN SERPL ELPH-MCNC: 4 G/DL
ANION GAP SERPL CALC-SCNC: 9 MMOL/L
BUN SERPL-MCNC: 25 MG/DL
CALCIUM SERPL-MCNC: 10.5 MG/DL
CHLORIDE SERPL-SCNC: 101 MMOL/L
CO2 SERPL-SCNC: 28 MMOL/L
CREAT SERPL-MCNC: 1.48 MG/DL
EGFR: 44 ML/MIN/1.73M2
GLUCOSE SERPL-MCNC: 96 MG/DL
PHOSPHATE SERPL-MCNC: 3 MG/DL
POTASSIUM SERPL-SCNC: 3.9 MMOL/L
SODIUM SERPL-SCNC: 138 MMOL/L

## 2023-10-16 ENCOUNTER — APPOINTMENT (OUTPATIENT)
Dept: DERMATOLOGY | Facility: CLINIC | Age: 88
End: 2023-10-16
Payer: MEDICARE

## 2023-10-16 ENCOUNTER — NON-APPOINTMENT (OUTPATIENT)
Age: 88
End: 2023-10-16

## 2023-10-16 PROCEDURE — 17313 MOHS 1 STAGE T/A/L: CPT

## 2023-10-16 PROCEDURE — 17314 MOHS ADDL STAGE T/A/L: CPT

## 2023-10-16 PROCEDURE — 12032 INTMD RPR S/A/T/EXT 2.6-7.5: CPT

## 2023-10-19 ENCOUNTER — APPOINTMENT (OUTPATIENT)
Dept: DERMATOLOGY | Facility: CLINIC | Age: 88
End: 2023-10-19
Payer: MEDICARE

## 2023-10-19 DIAGNOSIS — C44.91 BASAL CELL CARCINOMA OF SKIN, UNSPECIFIED: ICD-10-CM

## 2023-10-19 PROCEDURE — 99214 OFFICE O/P EST MOD 30 MIN: CPT | Mod: 24

## 2023-10-19 RX ORDER — TRIAMCINOLONE ACETONIDE 1 MG/G
0.1 CREAM TOPICAL TWICE DAILY
Qty: 1 | Refills: 1 | Status: ACTIVE | COMMUNITY
Start: 2023-10-19 | End: 1900-01-01

## 2023-10-24 PROBLEM — N18.32 STAGE 3B CHRONIC KIDNEY DISEASE: Status: ACTIVE | Noted: 2023-08-23

## 2023-10-25 ENCOUNTER — APPOINTMENT (OUTPATIENT)
Dept: INTERNAL MEDICINE | Facility: CLINIC | Age: 88
End: 2023-10-25
Payer: MEDICARE

## 2023-10-25 VITALS
OXYGEN SATURATION: 98 % | HEART RATE: 95 BPM | HEIGHT: 70 IN | TEMPERATURE: 97.7 F | SYSTOLIC BLOOD PRESSURE: 138 MMHG | WEIGHT: 156.3 LBS | DIASTOLIC BLOOD PRESSURE: 60 MMHG | RESPIRATION RATE: 16 BRPM | BODY MASS INDEX: 22.38 KG/M2

## 2023-10-25 DIAGNOSIS — I35.1 NONRHEUMATIC AORTIC (VALVE) INSUFFICIENCY: ICD-10-CM

## 2023-10-25 PROCEDURE — 99213 OFFICE O/P EST LOW 20 MIN: CPT

## 2023-11-01 ENCOUNTER — APPOINTMENT (OUTPATIENT)
Dept: DERMATOLOGY | Facility: CLINIC | Age: 88
End: 2023-11-01

## 2023-11-20 ENCOUNTER — APPOINTMENT (OUTPATIENT)
Dept: CARDIOLOGY | Facility: CLINIC | Age: 88
End: 2023-11-20

## 2023-11-25 ENCOUNTER — APPOINTMENT (OUTPATIENT)
Dept: RADIOLOGY | Facility: HOSPITAL | Age: 88
End: 2023-11-25
Payer: MEDICARE

## 2023-11-25 ENCOUNTER — OUTPATIENT (OUTPATIENT)
Dept: OUTPATIENT SERVICES | Facility: HOSPITAL | Age: 88
LOS: 1 days | End: 2023-11-25
Payer: MEDICARE

## 2023-11-25 DIAGNOSIS — N25.81 SECONDARY HYPERPARATHYROIDISM OF RENAL ORIGIN: ICD-10-CM

## 2023-11-25 DIAGNOSIS — Z98.890 OTHER SPECIFIED POSTPROCEDURAL STATES: Chronic | ICD-10-CM

## 2023-11-25 PROCEDURE — 77080 DXA BONE DENSITY AXIAL: CPT

## 2023-11-25 PROCEDURE — 77080 DXA BONE DENSITY AXIAL: CPT | Mod: 26

## 2023-12-28 NOTE — ASSESSMENT
[FreeTextEntry1] : *Left bundle branch block *Moderate to severe aortic regurgitation. Nuclear perfusion study negative for myocardial ischemia 2019. Upcoming echo follow-up leaky aortic valve, and Holter given LBBB.  *Hypertension. Amlodipine 5 daily. (losartan 25 on hold) cardiology approved with holding losartan.  Systolic blood pressure up 10-15 points, may need to increase amlodipine if trend persists, but systolic around 140 should be okay.  Recheck on return, patient seeing other specialists frequently as well.  *Chronic kidney disease stage IIIa.  Probable hypertensive nephropathy.  In 2021 creatinine 1.19, early 2023 1.29, though looking back to 2017 and 2020 creatinine about 1.4. Rising creatinine we held losartan 25, cardiology informed. Cr recovered to 1.3 due to improved GFR and rising not falling BP (against B OBED). PTH mildly elevated, normal vit D and no proteinuria, reassuring PSA.   Will obtain second opinion via nephrology, start Rocaltrol, obtain DEXA.  *Benign prostatic hyperplasia. Sees Dr. Mccoy and urology prescribed alfuzosin 10 mg.  I do not have access to PVRs but presumably these are being monitored and probably not elevated too badly.  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 Followed by Dr. Floyd hematology every 3 to 6 months.  In 2017 received IVIG rituximab and . Lowest count 100 in 2018, more recently 150-250 fortunately.  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 Followed closely by Dr. Pickard. Periodic systemic glucocorticoids.  *Subclinical hypothyroidism. TSH just above the upper limit of normal March 2023 recheck March 2024.  *Routine adult health maintenance. Has aged out of colon cancer screening, AAA screening, HPV HCV HIV screening. Technically eligible for Tdap Prevnar 20 Shingrix and this fall COVID monovalent booster and influenza. Screening for thyroid disease, diabetes March 2024. Would stop lipid screen at this age.  It was a pleasure to visit with Mr. Sherman today. Answered all questions as best I could. Disposition: Report visit 1 month. Time 25 minutes.

## 2023-12-28 NOTE — HISTORY OF PRESENT ILLNESS
[FreeTextEntry1] : Follow-up kidney disease [de-identified] : Most pleasant 89-year-old white male with history of ITP (platelets 154 recently) atopic dermatitis hypertension left bundle branch block moderate aortic insufficiency who recently established care, at which time it was noted that his creatinine had begun to climb.  We stopped his ARB and rechecked a couple weeks later in addition to checking PTH urine protein.  Results showed typical physiologic improvement in creatinine dropping from 1.53-1.34, PTH 90 with vit D 39,  unimpressive PSA and random protein:creatinine spot urine.  He also struggles with disequilibrium.  Neuro evaluation pending.  Saw cardiology who did not feel that this was cardiac.

## 2024-02-26 RX ORDER — CALCITRIOL 0.25 UG/1
0.25 CAPSULE, LIQUID FILLED ORAL
Qty: 90 | Refills: 1 | Status: ACTIVE | COMMUNITY
Start: 2023-08-23 | End: 1900-01-01

## 2024-03-27 RX ORDER — CALCIUM CARBONATE/VITAMIN D3 600 MG-10
600-10 TABLET ORAL
Qty: 180 | Refills: 1 | Status: ACTIVE | COMMUNITY
Start: 2023-08-23 | End: 1900-01-01

## 2024-04-15 ENCOUNTER — APPOINTMENT (OUTPATIENT)
Dept: DERMATOLOGY | Facility: CLINIC | Age: 89
End: 2024-04-15
Payer: MEDICARE

## 2024-04-15 DIAGNOSIS — L30.9 DERMATITIS, UNSPECIFIED: ICD-10-CM

## 2024-04-15 PROCEDURE — 99214 OFFICE O/P EST MOD 30 MIN: CPT

## 2024-04-15 NOTE — HISTORY OF PRESENT ILLNESS
[FreeTextEntry1] : f/u dermatitis, leg swelling [de-identified] : 89M with a hx of multiple NMSC here for f/u dermatitis. At , patient was taking prednisone 10mg alternating with 5mg with improvement of his rash. Not moisturizing. Here more urgently for LE swelling x weeks. Notes fluid drainage.   Pt is s/p Mohs surgery 11/2021 to [facility] to L [facility] w/Dr. Cerna (3 stages), w/incidental focus of BCC noted during Mohs surgery on lateral edge of stage 3, discussed possible Efudex to area, pt did not tolerate surgery well.  Also with HAK on the LLE diagnosed 9/2021  Also has other likely NMSC clinically diagnosed at prior visits. Has elected to clinically monitor these lesions

## 2024-04-15 NOTE — PHYSICAL EXAM
[FreeTextEntry3] : Pink patches on the abdomen LEs with edematous hyperpigmented plaques with 2+ edema (L>R)

## 2024-04-30 ENCOUNTER — APPOINTMENT (OUTPATIENT)
Dept: INTERNAL MEDICINE | Facility: CLINIC | Age: 89
End: 2024-04-30
Payer: MEDICARE

## 2024-04-30 VITALS
SYSTOLIC BLOOD PRESSURE: 112 MMHG | HEIGHT: 70 IN | OXYGEN SATURATION: 97 % | RESPIRATION RATE: 16 BRPM | HEART RATE: 94 BPM | WEIGHT: 156 LBS | TEMPERATURE: 97.4 F | DIASTOLIC BLOOD PRESSURE: 66 MMHG | BODY MASS INDEX: 22.33 KG/M2

## 2024-04-30 DIAGNOSIS — I87.2 VENOUS INSUFFICIENCY (CHRONIC) (PERIPHERAL): ICD-10-CM

## 2024-04-30 DIAGNOSIS — D69.6 THROMBOCYTOPENIA, UNSPECIFIED: ICD-10-CM

## 2024-04-30 DIAGNOSIS — I44.7 LEFT BUNDLE-BRANCH BLOCK, UNSPECIFIED: ICD-10-CM

## 2024-04-30 DIAGNOSIS — R26.81 UNSTEADINESS ON FEET: ICD-10-CM

## 2024-04-30 DIAGNOSIS — Z00.00 ENCOUNTER FOR GENERAL ADULT MEDICAL EXAMINATION W/OUT ABNORMAL FINDINGS: ICD-10-CM

## 2024-04-30 DIAGNOSIS — R94.31 ABNORMAL ELECTROCARDIOGRAM [ECG] [EKG]: ICD-10-CM

## 2024-04-30 DIAGNOSIS — I10 ESSENTIAL (PRIMARY) HYPERTENSION: ICD-10-CM

## 2024-04-30 DIAGNOSIS — N25.81 SECONDARY HYPERPARATHYROIDISM OF RENAL ORIGIN: ICD-10-CM

## 2024-04-30 PROCEDURE — 99215 OFFICE O/P EST HI 40 MIN: CPT

## 2024-04-30 PROCEDURE — G2211 COMPLEX E/M VISIT ADD ON: CPT

## 2024-04-30 RX ORDER — AMLODIPINE BESYLATE 5 MG/1
5 TABLET ORAL DAILY
Qty: 90 | Refills: 3 | Status: DISCONTINUED | COMMUNITY
Start: 2017-09-06 | End: 2024-04-30

## 2024-04-30 RX ORDER — PREDNISONE 10 MG/1
10 TABLET ORAL
Qty: 30 | Refills: 1 | Status: DISCONTINUED | COMMUNITY
Start: 2023-07-10 | End: 2024-04-30

## 2024-04-30 NOTE — HEALTH RISK ASSESSMENT
[Good] : ~his/her~ current health as good [Very Good] : ~his/her~  mood as very good [No] : In the past 12 months have you used drugs other than those required for medical reasons? No [No falls in past year] : Patient reported no falls in the past year [0] : 1) Little interest or pleasure doing things: Not at all (0) [1] : 2) Feeling down, depressed, or hopeless for several days (1) [PHQ-2 Negative - No further assessment needed] : PHQ-2 Negative - No further assessment needed [Audit-CScore] : 0 [de-identified] : sedentary [de-identified] : fair [[facility]] : 12 [[id number]] : 1 [Patient declined mammogram] : Patient declined mammogram [Patient declined PAP Smear] : Patient declined PAP Smear [Patient declined bone density test] : Patient declined bone density test [Patient declined colonoscopy] : Patient declined colonoscopy [HIV test declined] : HIV test declined [Change in mental status noted] : No change in mental status noted [Language] : denies difficulty with language [Behavior] : denies difficulty with behavior [Learning/Retaining New Information] : difficulty learning/retaining new information [Handling Complex Tasks] : difficulty handling complex tasks [Reasoning] : denies difficulty with reasoning [Spatial Ability and Orientation] : denies difficulty with spatial ability and orientation [With Family] : lives with family [# of Members in Household ___] :  household currently consist of [unfilled] member(s) [Retired] : retired [Sexually Active] : not sexually active [High Risk Behavior] : no high risk behavior [Feels Safe at Home] : Feels safe at home [Fully functional (bathing, dressing, toileting, transferring, walking, feeding)] : Fully functional (bathing, dressing, toileting, transferring, walking, feeding) [Fully functional (using the telephone, shopping, preparing meals, housekeeping, doing laundry, using] : Fully functional and needs no help or supervision to perform IADLs (using the telephone, shopping, preparing meals, housekeeping, doing laundry, using transportation, managing medications and managing finances) [Reports changes in hearing] : Reports changes in hearing [Reports changes in vision] : Reports no changes in vision [Reports normal functional visual acuity (ie: able to read med bottle)] : Reports normal functional visual acuity [Reports changes in dental health] : Reports no changes in dental health [Smoke Detector] : smoke detector [Carbon Monoxide Detector] : carbon monoxide detector [Guns at Home] : no guns at home [Safety elements used in home] : safety elements used in home [Seat Belt] : does not use seat belt [Sunscreen] : does not use sunscreen [Travel to Developing Areas] : does not  travel to developing areas [TB Exposure] : is not being exposed to tuberculosis [Caregiver Concerns] : does not have caregiver concerns [de-identified] : Bilateral hearing aids [Never] : Never

## 2024-04-30 NOTE — ASSESSMENT
[FreeTextEntry1] : *Osteoporosis.  T-score -3.6 right femoral neck, November 2023.  Risk factors chronic corticosteroid use, chronic kidney disease.  Update PTH vitamin D.  Willing to televisit with osteoporosis clinic, referral sent  *Left bundle branch block *Moderate aortic regurgitation. Nuclear perfusion study negative for myocardial ischemia 2019. 2023 echo follow-up leaky aortic valve moderate AR mild MR mild to moderate TR EF 52%, and 2023Holter mean 72 bpm, range 48-1 02 with frequent APCs, 15% of beats; and typically 3 beat runs of atrial tachycardia longest 17 beats lasting about 8 seconds.  Update EKG on return to screen for progression of left bundle to bifascicular block.  *Hypertension. Amlodipine 5 hs.  Blood pressure in the clinic today appears adequate.  Creatinine 1.5 2023, see below. Plan switch to lisinopril 10/HCTZ 12.5 q AM to see if helps with ankle edema. Check BMP now and in 2 weeks.  *Dry friable skin. Asked to apply cerave cream daily to extremities.  *Ankle edema.  Discontinue amlodipine and start at least calf high compression hose, and improve skin moisturization to reduce risk of dermatitis and skin breakdown.  *Chronic disequilibrium.  Patient feels "off" when he lays flat in bed but states that it is worsened when he is up right.  He tried walking a mile at the track he had the day, having to frequently rest.  Extensive workup neurology and cardiology failed to identify a discrete reversible cause other than orthostatic hypotension.  Today blood pressure supine to standing 110/56 pulse 54 versus 118/70 pulse 93 standing.  Suspect volume depletion, asked to drink 2 quarts of water a day, engage physical therapy on strengthening though not vestibular rehab.  Do not expect much dehydration from 12.5 mg hydrochlorothiazide.  *Chronic kidney disease stage IIIa. *Secondary hyperparathyroidism.  In 2021 creatinine 1.19, early 2023 1.29, though looking back to 2017 in 2020 creatinine about 1.4. Rising creatinine prompted trial holding ARB summer 2023, cardiology informed, without durable recovery of renal function PTH elevated at 90 vitamin D levels fair though not excellent without history of stone and PSA stable.  Nephrology kindly obtained renal ultrasound which showed no hydronephrosis, agreed most likely hypertensive nephropathy. No specific recommendations forthcoming regarding vitamin D supplementation from them, in August we started patient on calcitriol 0.25 mcg daily plus calcium/vitamin D 600-10 twice daily. Update PTH, vitamin D, renal panel.  *Benign prostatic hyperplasia. Sees Dr. Mccoy and urology prescribed alfuzosin 10 mg.  At follow up, will ask to hold briefly to see if any improvement in his disequilibrium.  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 Followed by Dr. Floyd hematology every 3 to 6 months. Plat 150-290 past few yrs.  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 Followed closely by Dr. Pickard. Asked to see for Left ear lesion if not healing on rtn..  *Subclinical hypothyroidism. TSH just above the upper limit of normal March 2023 recheck now.  *Routine adult health maintenance. Has aged out of colon cancer screening, AAA screening, HPV HCV HIV screening. Technically eligible for Tdap Prevnar 20 Shingrix and this fall COVID monovalent booster and influenza. Screening for thyroid disease, diabetes now.  Would stop lipid screen at this age.  It was a pleasure to visit with Mr. Sherman today. Answered all questions as best I could. Disposition: Follow-up 2 weeks orthostatics EKG repeat BMP assess impact on ankle edema and chronic dizziness. Time 60 minutes

## 2024-04-30 NOTE — PHYSICAL EXAM
[No Acute Distress] : no acute distress [Well Developed] : well developed [Well-Appearing] : well-appearing [No CVA Tenderness] : no CVA  tenderness [Kyphosis] : kyphosis [Normal] : normal gait, coordination grossly intact, no focal deficits and deep tendon reflexes were 2+ and symmetric [Speech Grossly Normal] : speech grossly normal [Alert and Oriented x3] : oriented to person, place, and time [de-identified] : Mildly cachectic [de-identified] : Bilateral hearing aids.  Small amount of earwax removed with some difficulty bilaterally [de-identified] : 2+ ankle edema no calf edema no foot edema [de-identified] : OA changes. [de-identified] : Dry extremely friable.  Left ear with a pickers lesion [de-identified] : Narrow-based gait.  No resting tremor.

## 2024-05-06 ENCOUNTER — APPOINTMENT (OUTPATIENT)
Dept: ENDOCRINOLOGY | Facility: CLINIC | Age: 89
End: 2024-05-06

## 2024-05-08 RX ORDER — LISINOPRIL AND HYDROCHLOROTHIAZIDE TABLETS 20; 12.5 MG/1; MG/1
20-12.5 TABLET ORAL
Qty: 90 | Refills: 3 | Status: DISCONTINUED | COMMUNITY
Start: 2024-04-30 | End: 2024-05-08

## 2024-05-12 ENCOUNTER — INPATIENT (INPATIENT)
Facility: HOSPITAL | Age: 89
LOS: 2 days | Discharge: HOME | DRG: 149 | End: 2024-05-15
Attending: FAMILY MEDICINE | Admitting: INTERNAL MEDICINE
Payer: MEDICARE

## 2024-05-12 VITALS
RESPIRATION RATE: 18 BRPM | WEIGHT: 156.09 LBS | DIASTOLIC BLOOD PRESSURE: 72 MMHG | OXYGEN SATURATION: 97 % | HEART RATE: 85 BPM | HEIGHT: 70 IN | TEMPERATURE: 98 F | SYSTOLIC BLOOD PRESSURE: 106 MMHG

## 2024-05-12 DIAGNOSIS — Z98.890 OTHER SPECIFIED POSTPROCEDURAL STATES: Chronic | ICD-10-CM

## 2024-05-12 DIAGNOSIS — R42 DIZZINESS AND GIDDINESS: ICD-10-CM

## 2024-05-12 LAB
ALBUMIN SERPL ELPH-MCNC: 3.2 G/DL — LOW (ref 3.3–5)
ALP SERPL-CCNC: 70 U/L — SIGNIFICANT CHANGE UP (ref 40–120)
ALT FLD-CCNC: 15 U/L — SIGNIFICANT CHANGE UP (ref 10–45)
ANION GAP SERPL CALC-SCNC: 9 MMOL/L — SIGNIFICANT CHANGE UP (ref 5–17)
APPEARANCE UR: CLEAR — SIGNIFICANT CHANGE UP
AST SERPL-CCNC: 18 U/L — SIGNIFICANT CHANGE UP (ref 10–40)
BASOPHILS # BLD AUTO: 0.02 K/UL — SIGNIFICANT CHANGE UP (ref 0–0.2)
BASOPHILS NFR BLD AUTO: 0.4 % — SIGNIFICANT CHANGE UP (ref 0–2)
BILIRUB SERPL-MCNC: 0.9 MG/DL — SIGNIFICANT CHANGE UP (ref 0.2–1.2)
BILIRUB UR-MCNC: NEGATIVE — SIGNIFICANT CHANGE UP
BUN SERPL-MCNC: 34 MG/DL — HIGH (ref 7–23)
CALCIUM SERPL-MCNC: 10 MG/DL — SIGNIFICANT CHANGE UP (ref 8.4–10.5)
CHLORIDE SERPL-SCNC: 104 MMOL/L — SIGNIFICANT CHANGE UP (ref 96–108)
CO2 SERPL-SCNC: 23 MMOL/L — SIGNIFICANT CHANGE UP (ref 22–31)
COLOR SPEC: YELLOW — SIGNIFICANT CHANGE UP
CREAT SERPL-MCNC: 1.72 MG/DL — HIGH (ref 0.5–1.3)
DIFF PNL FLD: NEGATIVE — SIGNIFICANT CHANGE UP
EGFR: 37 ML/MIN/1.73M2 — LOW
EOSINOPHIL # BLD AUTO: 0.05 K/UL — SIGNIFICANT CHANGE UP (ref 0–0.5)
EOSINOPHIL NFR BLD AUTO: 0.9 % — SIGNIFICANT CHANGE UP (ref 0–6)
GLUCOSE SERPL-MCNC: 121 MG/DL — HIGH (ref 70–99)
GLUCOSE UR QL: NEGATIVE MG/DL — SIGNIFICANT CHANGE UP
HCT VFR BLD CALC: 40.8 % — SIGNIFICANT CHANGE UP (ref 39–50)
HGB BLD-MCNC: 14.2 G/DL — SIGNIFICANT CHANGE UP (ref 13–17)
IMM GRANULOCYTES NFR BLD AUTO: 0.2 % — SIGNIFICANT CHANGE UP (ref 0–0.9)
KETONES UR-MCNC: NEGATIVE MG/DL — SIGNIFICANT CHANGE UP
LEUKOCYTE ESTERASE UR-ACNC: NEGATIVE — SIGNIFICANT CHANGE UP
LYMPHOCYTES # BLD AUTO: 0.81 K/UL — LOW (ref 1–3.3)
LYMPHOCYTES # BLD AUTO: 14.7 % — SIGNIFICANT CHANGE UP (ref 13–44)
MAGNESIUM SERPL-MCNC: 2 MG/DL — SIGNIFICANT CHANGE UP (ref 1.6–2.6)
MCHC RBC-ENTMCNC: 31.9 PG — SIGNIFICANT CHANGE UP (ref 27–34)
MCHC RBC-ENTMCNC: 34.8 GM/DL — SIGNIFICANT CHANGE UP (ref 32–36)
MCV RBC AUTO: 91.7 FL — SIGNIFICANT CHANGE UP (ref 80–100)
MONOCYTES # BLD AUTO: 0.65 K/UL — SIGNIFICANT CHANGE UP (ref 0–0.9)
MONOCYTES NFR BLD AUTO: 11.8 % — SIGNIFICANT CHANGE UP (ref 2–14)
NEUTROPHILS # BLD AUTO: 3.96 K/UL — SIGNIFICANT CHANGE UP (ref 1.8–7.4)
NEUTROPHILS NFR BLD AUTO: 72 % — SIGNIFICANT CHANGE UP (ref 43–77)
NITRITE UR-MCNC: NEGATIVE — SIGNIFICANT CHANGE UP
NRBC # BLD: 0 /100 WBCS — SIGNIFICANT CHANGE UP (ref 0–0)
PH UR: 5 — SIGNIFICANT CHANGE UP (ref 5–8)
PLATELET # BLD AUTO: 169 K/UL — SIGNIFICANT CHANGE UP (ref 150–400)
POTASSIUM SERPL-MCNC: 4.9 MMOL/L — SIGNIFICANT CHANGE UP (ref 3.5–5.3)
POTASSIUM SERPL-SCNC: 4.9 MMOL/L — SIGNIFICANT CHANGE UP (ref 3.5–5.3)
PROT SERPL-MCNC: 6.9 G/DL — SIGNIFICANT CHANGE UP (ref 6–8.3)
PROT UR-MCNC: NEGATIVE MG/DL — SIGNIFICANT CHANGE UP
RBC # BLD: 4.45 M/UL — SIGNIFICANT CHANGE UP (ref 4.2–5.8)
RBC # FLD: 13.2 % — SIGNIFICANT CHANGE UP (ref 10.3–14.5)
SODIUM SERPL-SCNC: 136 MMOL/L — SIGNIFICANT CHANGE UP (ref 135–145)
SP GR SPEC: 1.02 — SIGNIFICANT CHANGE UP (ref 1–1.03)
TROPONIN I, HIGH SENSITIVITY RESULT: 24.8 NG/L — SIGNIFICANT CHANGE UP
TSH SERPL-MCNC: 2.94 UIU/ML — SIGNIFICANT CHANGE UP (ref 0.36–3.74)
UROBILINOGEN FLD QL: 1 MG/DL — SIGNIFICANT CHANGE UP (ref 0.2–1)
WBC # BLD: 5.5 K/UL — SIGNIFICANT CHANGE UP (ref 3.8–10.5)
WBC # FLD AUTO: 5.5 K/UL — SIGNIFICANT CHANGE UP (ref 3.8–10.5)

## 2024-05-12 PROCEDURE — 71045 X-RAY EXAM CHEST 1 VIEW: CPT | Mod: 26

## 2024-05-12 PROCEDURE — 99223 1ST HOSP IP/OBS HIGH 75: CPT

## 2024-05-12 PROCEDURE — 70450 CT HEAD/BRAIN W/O DYE: CPT | Mod: 26,MC

## 2024-05-12 PROCEDURE — 99285 EMERGENCY DEPT VISIT HI MDM: CPT

## 2024-05-12 PROCEDURE — 93010 ELECTROCARDIOGRAM REPORT: CPT | Mod: 76

## 2024-05-12 RX ORDER — METOCLOPRAMIDE HCL 10 MG
10 TABLET ORAL ONCE
Refills: 0 | Status: COMPLETED | OUTPATIENT
Start: 2024-05-12 | End: 2024-05-12

## 2024-05-12 RX ORDER — CALCITRIOL 0.5 UG/1
0.25 CAPSULE ORAL DAILY
Refills: 0 | Status: DISCONTINUED | OUTPATIENT
Start: 2024-05-13 | End: 2024-05-15

## 2024-05-12 RX ORDER — SODIUM CHLORIDE 9 MG/ML
500 INJECTION INTRAMUSCULAR; INTRAVENOUS; SUBCUTANEOUS ONCE
Refills: 0 | Status: COMPLETED | OUTPATIENT
Start: 2024-05-12 | End: 2024-05-12

## 2024-05-12 RX ORDER — SODIUM CHLORIDE 9 MG/ML
1000 INJECTION INTRAMUSCULAR; INTRAVENOUS; SUBCUTANEOUS ONCE
Refills: 0 | Status: COMPLETED | OUTPATIENT
Start: 2024-05-12 | End: 2024-05-12

## 2024-05-12 RX ORDER — ASPIRIN/CALCIUM CARB/MAGNESIUM 324 MG
81 TABLET ORAL DAILY
Refills: 0 | Status: DISCONTINUED | OUTPATIENT
Start: 2024-05-12 | End: 2024-05-15

## 2024-05-12 RX ORDER — DUREZOL 0.5 MG/ML
1 EMULSION OPHTHALMIC
Qty: 0 | Refills: 0 | DISCHARGE

## 2024-05-12 RX ORDER — ENOXAPARIN SODIUM 100 MG/ML
30 INJECTION SUBCUTANEOUS EVERY 24 HOURS
Refills: 0 | Status: DISCONTINUED | OUTPATIENT
Start: 2024-05-12 | End: 2024-05-14

## 2024-05-12 RX ORDER — TAMSULOSIN HYDROCHLORIDE 0.4 MG/1
0.8 CAPSULE ORAL AT BEDTIME
Refills: 0 | Status: DISCONTINUED | OUTPATIENT
Start: 2024-05-12 | End: 2024-05-15

## 2024-05-12 RX ORDER — PREDNISOLONE 5 MG
1 TABLET ORAL
Refills: 0 | DISCHARGE

## 2024-05-12 RX ORDER — MOXIFLOXACIN HCL 0.5 %
1 DROPS OPHTHALMIC (EYE)
Qty: 0 | Refills: 0 | DISCHARGE

## 2024-05-12 RX ORDER — ENOXAPARIN SODIUM 100 MG/ML
40 INJECTION SUBCUTANEOUS EVERY 24 HOURS
Refills: 0 | Status: DISCONTINUED | OUTPATIENT
Start: 2024-05-12 | End: 2024-05-12

## 2024-05-12 RX ORDER — ROMIPLOSTIM 250 UG/.5ML
0 INJECTION, POWDER, LYOPHILIZED, FOR SOLUTION SUBCUTANEOUS
Qty: 0 | Refills: 0 | DISCHARGE

## 2024-05-12 RX ORDER — MULTIVIT-MIN/FERROUS GLUCONATE 9 MG/15 ML
1 LIQUID (ML) ORAL
Qty: 0 | Refills: 0 | DISCHARGE

## 2024-05-12 RX ORDER — CALCITRIOL 0.5 UG/1
1 CAPSULE ORAL
Refills: 0 | DISCHARGE

## 2024-05-12 RX ORDER — ACETAMINOPHEN 500 MG
650 TABLET ORAL EVERY 6 HOURS
Refills: 0 | Status: DISCONTINUED | OUTPATIENT
Start: 2024-05-12 | End: 2024-05-15

## 2024-05-12 RX ORDER — METOPROLOL TARTRATE 50 MG
12.5 TABLET ORAL EVERY 12 HOURS
Refills: 0 | Status: DISCONTINUED | OUTPATIENT
Start: 2024-05-12 | End: 2024-05-13

## 2024-05-12 RX ORDER — SODIUM CHLORIDE 9 MG/ML
1000 INJECTION, SOLUTION INTRAVENOUS
Refills: 0 | Status: DISCONTINUED | OUTPATIENT
Start: 2024-05-12 | End: 2024-05-13

## 2024-05-12 RX ORDER — MECLIZINE HCL 12.5 MG
12.5 TABLET ORAL
Refills: 0 | Status: DISCONTINUED | OUTPATIENT
Start: 2024-05-12 | End: 2024-05-15

## 2024-05-12 RX ORDER — MECLIZINE HCL 12.5 MG
50 TABLET ORAL ONCE
Refills: 0 | Status: COMPLETED | OUTPATIENT
Start: 2024-05-12 | End: 2024-05-12

## 2024-05-12 RX ORDER — ALFUZOSIN HYDROCHLORIDE 10 MG/1
1 TABLET, EXTENDED RELEASE ORAL
Refills: 0 | DISCHARGE

## 2024-05-12 RX ADMIN — Medication 10 MILLIGRAM(S): at 16:43

## 2024-05-12 RX ADMIN — SODIUM CHLORIDE 2000 MILLILITER(S): 9 INJECTION INTRAMUSCULAR; INTRAVENOUS; SUBCUTANEOUS at 16:15

## 2024-05-12 RX ADMIN — SODIUM CHLORIDE 500 MILLILITER(S): 9 INJECTION INTRAMUSCULAR; INTRAVENOUS; SUBCUTANEOUS at 19:00

## 2024-05-12 RX ADMIN — SODIUM CHLORIDE 75 MILLILITER(S): 9 INJECTION, SOLUTION INTRAVENOUS at 23:36

## 2024-05-12 RX ADMIN — SODIUM CHLORIDE 75 MILLILITER(S): 9 INJECTION, SOLUTION INTRAVENOUS at 21:44

## 2024-05-12 RX ADMIN — Medication 50 MILLIGRAM(S): at 18:00

## 2024-05-12 RX ADMIN — SODIUM CHLORIDE 500 MILLILITER(S): 9 INJECTION INTRAMUSCULAR; INTRAVENOUS; SUBCUTANEOUS at 18:00

## 2024-05-12 RX ADMIN — SODIUM CHLORIDE 1000 MILLILITER(S): 9 INJECTION INTRAMUSCULAR; INTRAVENOUS; SUBCUTANEOUS at 16:45

## 2024-05-12 RX ADMIN — TAMSULOSIN HYDROCHLORIDE 0.8 MILLIGRAM(S): 0.4 CAPSULE ORAL at 23:36

## 2024-05-12 NOTE — ED ADULT NURSE NOTE - NSFALLHARMRISKINTERV_ED_ALL_ED
Assistance OOB with selected safe patient handling equipment if applicable/Assistance with ambulation/Communicate risk of Fall with Harm to all staff, patient, and family/Encourage patient to sit up slowly, dangle for a short time, stand at bedside before walking/Monitor gait and stability/Orthostatic vital signs/Provide visual cue: red socks, yellow wristband, yellow gown, etc/Reinforce activity limits and safety measures with patient and family/Bed in lowest position, wheels locked, appropriate side rails in place/Call bell, personal items and telephone in reach/Instruct patient to call for assistance before getting out of bed/chair/stretcher/Non-slip footwear applied when patient is off stretcher/[place] to call system/Physically safe environment - no spills, clutter or unnecessary equipment/Purposeful Proactive Rounding/Room/bathroom lighting operational, light cord in reach

## 2024-05-12 NOTE — H&P ADULT - HISTORY OF PRESENT ILLNESS
Mr Sherman is a 89-year-old male with hypertension, chronic ITP, chronic dermatitis on chronic Prednisone 5 mg/day, BPH, skin cancer, presents to the emergency department from home complaining of worsening dizziness and weakness this week.  As per wife, patient apparently has been having dizziness for months, he had seen a Neurologist about 8 months ago, had MRI, was told it was unremarkable. Pt has also been following up with his PCP and BP meds were also switched at some point but it also did not resolve the dizziness.  Mr Sherman is a 89-year-old male with hypertension, aortic insufficiency, chronic ITP, chronic dermatitis on chronic Prednisone 5 mg/day, BPH, mild CKD, skin cancer, presents to the emergency department from home complaining of worsening dizziness and weakness this week.  As per wife, patient apparently has been having dizziness for months, he had seen a Neurologist about 8 months ago, had MRI, was told it was unremarkable. Pt has also been following up with his PCP and BP meds were also switched at some point but it also did not resolve the dizziness. Patient states he gets dizzy whenever he moves and he apparently fell last  week because of this, since he also lost his balance.  There was no head injury nor loss of consciousness.  He denies chest pain, dyspnea, nausea, vomiting, abdominal pain.  Pt reports that he just stayed in bed today because of the worsening dizziness. He states he had palpitations today.   On tele monitor in ED, pt noted to be in and out of afib.     Labs significant for BUN/crea 34/1.7  (baseline creat is 1.29).  1st EKG showed sinus rhythm, 98/min with PAC's, LBBB.  2nd EKG showed atrial fib with /min, LBBB.   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 There is heavily calcified atheromatous disease at both V4 vertebral segments. Moderate generalized cerebral volume loss, with distention of the sulci \and concomitant ex-vacuo ventricular dilatation. Mild-to-moderate nonspecific low attenuation in the periventricular and subcortical white matter, likely due to small vessel disease. No acute intracranial hemorrhage. No midline shift or herniation. No CT   evidence of acute territorial infarction,  Cxray is unremarkable.

## 2024-05-12 NOTE — ED ADULT NURSE NOTE - OBJECTIVE STATEMENT
Pt presents to the ER complaining of dizziness and generalized weakness for the past few weeks. Pt states that on May 3rd he had a fall without head strike or LOC but states that he injured his right shoulder but it is feeling better. A&OX4. Pt denies SOB, chest pain, vomiting, diarrhea, fevers, or headache.

## 2024-05-12 NOTE — H&P ADULT - ASSESSMENT
Dizziness, chronic but has worsened this week  Causing pt to fall  acute kidney injury, dehydration  Atrial fibrillation, paroxysmal - CHADsVasc score of 3, however, pt is at increased risk of falls (due to dizziness)  Hypertension  Aortic insufficiency  CKD, mild  BPH    Admit to telemetry  Gentle hydration  Would d/c amlodipine  Would start Metoprolol 12.5 mg twice daily   Would start aspirin 162 mg/day  Echo ordered  MRI brain ordered  FFup labs, check lipid panel       Dizziness, chronic but has worsened this week  Causing pt to fall  acute kidney injury, dehydration  Atrial fibrillation, paroxysmal - CHADsVasc score of 3, however, pt is at increased risk of falls (due to dizziness)  Hypertension  Aortic insufficiency  CKD, mild  BPH    Admit to telemetry  Gentle hydration  Would d/c amlodipine  Would start Metoprolol 12.5 mg twice daily   Would start aspirin 81 mg/day  Echo ordered  MRI brain ordered  FFup labs, check lipid panel  Cardiology consult  Neurology consult  PT eval  DVT prophylaxis       Dizziness, chronic but has worsened this week, causing poor balance and fall  Vertebrobasilar Dolichoectasia  Acute kidney injury, dehydration  Atrial fibrillation, paroxysmal - CHADsVasc score of 3  Hypertension  Left Bundle branch block  Aortic insufficiency  CKD, mild  BPH    Admit to telemetry  Gentle hydration  Would d/c amlodipine  Would start Metoprolol 12.5 mg twice daily   Would start aspirin 81 mg/day  No anticoagulant ordered at this time, in view of risk for falls  Echo ordered  MRI brain ordered  FFup labs, check lipid panel  Cardiology consult  Neurology consult  PT eval  DVT prophylaxis

## 2024-05-12 NOTE — ED ADULT NURSE NOTE - NSICDXPASTMEDICALHX_GEN_ALL_CORE_FT
PAST MEDICAL HISTORY:  Bladder polyps     BPH (benign prostatic hyperplasia)     Ruslan's disease     HTN (hypertension)     Osteoarthritis     Thrombocytopenia

## 2024-05-12 NOTE — H&P ADULT - GASTROINTESTINAL
soft/nontender/nondistended/normal active bowel sounds/no guarding/no rigidity/no organomegaly/no palpable kevin

## 2024-05-12 NOTE — H&P ADULT - TIME BILLING
Management of acute medical problem, thorough review of labs, imaging and previous charts, discussion with consultants.  In my medical judgement, I deem it necessary for this patient to require inpatient management, at least 2 midnight stay, possibly more, for acute treatment and reassessment, event monitoring that may require immediate intervention and further diagnostic and medical evaluation to establish a definitive treatment plan.

## 2024-05-12 NOTE — ED PROVIDER NOTE - CARE PLAN
1 Principal Discharge DX:	Dizziness  Secondary Diagnosis:	ASHER (acute kidney injury)  Secondary Diagnosis:	Atrial fibrillation

## 2024-05-12 NOTE — H&P ADULT - NSICDXPASTSURGICALHX_GEN_ALL_CORE_FT
PAST SURGICAL HISTORY:  H/O hernia repair     Status post Mohs surgery      PAST SURGICAL HISTORY:  H/O hernia repair     H/O transurethral resection of bladder tumor (TURBT)     History of transurethral resection of bladder tumor (TURBT)     Status post Mohs surgery

## 2024-05-12 NOTE — H&P ADULT - NSHPPHYSICALEXAM_GEN_ALL_CORE
Vital Signs (24 Hrs):  T(C): 36.9 (05-12-24 @ 15:37), Max: 36.9 (05-12-24 @ 15:37)  HR: 90 (05-12-24 @ 19:27) (85 - 93)  BP: 121/76 (05-12-24 @ 19:27) (106/72 - 136/57)  RR: 20 (05-12-24 @ 19:27) (18 - 20)  SpO2: 98% (05-12-24 @ 19:27) (97% - 98%)  Wt(kg): --  Daily Height in cm: 177.8 (12 May 2024 15:37)    Daily     I&O's Summary

## 2024-05-12 NOTE — ED PROVIDER NOTE - CLINICAL SUMMARY MEDICAL DECISION MAKING FREE TEXT BOX
89-year-old male history of hypertension presents to the emergency department from home for dizziness and weakness.  Patient has been seeing his provider within the past few weeks where they have made modifications to his antihypertensives to see if it would help.  Patient is usually on amlodipine.  It was advised that he took it at bedtime which she did however it was not helping.  It was switched to lisinopril hydrochlorothiazide 10–12 0.5 and he took it for 2 days however after those 2 days, he sustained a fall on or about 3 May.  Patient states no head injury but he did hurt his right shoulder and that is getting better.  Patient reporting that he still feels room spinning sensation dizziness.  He also is acknowledging that he under hydrates.  Not eating well.  He states that he had blood work done on Friday and was advised to follow-up with his primary on Tuesday however did not feel he could wait until then so came to the emergency department.  There is no chest pain or shortness of breath.  There is no abdominal pain.  No vomiting or diarrhea.  Exam as stated. Plan for labs with CT. CXR EKG. IV fluids Reglan reassess. 89-year-old male history of hypertension presents to the emergency department from home for dizziness and weakness.  Patient has been seeing his provider within the past few weeks where they have made modifications to his antihypertensives to see if it would help.  Patient is usually on amlodipine.  It was advised that he took it at bedtime which she did however it was not helping.  It was switched to lisinopril hydrochlorothiazide 10–12 0.5 and he took it for 2 days however after those 2 days, he sustained a fall on or about 3 May.  Patient states no head injury but he did hurt his right shoulder and that is getting better.  Patient reporting that he still feels room spinning sensation dizziness.  He also is acknowledging that he under hydrates.  Not eating well.  He states that he had blood work done on Friday and was advised to follow-up with his primary on Tuesday however did not feel he could wait until then so came to the emergency department.  There is no chest pain or shortness of breath.  There is no abdominal pain.  No vomiting or diarrhea.  Exam as stated. Plan for labs with CT. CXR EKG. IV fluids Reglan reassess.    Reassessed pt. Still dizzy. On monitor, pt having intermittent AFib. EKG repeated and confirms. D/W pt . Will admit. Son at bedside, agrees.

## 2024-05-12 NOTE — ED PROVIDER NOTE - PHYSICAL EXAMINATION
General:     NAD  Eyes: PERRL, white sclera  Head:     NC/AT, EOMI  Pharynx: pharynx wnl, oral mucosa dry  Neck:     trachea midline  Lungs:     CTA b/l  CVS:     RRR  Abd:     +BS, s/nt/nd  Ext:   no deformities   Skin: no rash and tenting  Neuro: AAOx3, no sensory/motor deficits, cn 3-12 intact.

## 2024-05-12 NOTE — PATIENT PROFILE ADULT - FALL HARM RISK - HARM RISK INTERVENTIONS

## 2024-05-12 NOTE — ED PROVIDER NOTE - OBJECTIVE STATEMENT
89-year-old male history of hypertension presents to the emergency department from home for dizziness and weakness.  Patient has been seeing his provider within the past few weeks where they have made modifications to his antihypertensives to see if it would help.  Patient is usually on amlodipine.  It was advised that he took it at bedtime which she did however it was not helping.  It was switched to lisinopril hydrochlorothiazide 10–12 0.5 and he took it for 2 days however after those 2 days, he sustained a fall on or about 3 May.  Patient states no head injury but he did hurt his right shoulder and that is getting better.  Patient reporting that he still feels room spinning sensation dizziness.  He also is acknowledging that he under hydrates.  Not eating well.  He states that he had blood work done on Friday and was advised to follow-up with his primary on Tuesday however did not feel he could wait until then so came to the emergency department.  There is no chest pain or shortness of breath.  There is no abdominal pain.  No vomiting or diarrhea.

## 2024-05-13 ENCOUNTER — RESULT REVIEW (OUTPATIENT)
Age: 89
End: 2024-05-13

## 2024-05-13 LAB
25(OH)D3 SERPL-MCNC: 33.9 NG/ML
ALBUMIN SERPL ELPH-MCNC: 4 G/DL
ALP BLD-CCNC: 69 U/L
ALT SERPL-CCNC: 8 U/L
ANION GAP SERPL CALC-SCNC: 11 MMOL/L
AST SERPL-CCNC: 15 U/L
BASOPHILS # BLD AUTO: 0.04 K/UL
BASOPHILS NFR BLD AUTO: 0.7 %
BILIRUB SERPL-MCNC: 1.1 MG/DL
BUN SERPL-MCNC: 25 MG/DL
CALCIUM SERPL-MCNC: 10.4 MG/DL
CALCIUM SERPL-MCNC: 10.4 MG/DL
CHLORIDE SERPL-SCNC: 102 MMOL/L
CO2 SERPL-SCNC: 25 MMOL/L
COLLAGEN NTX UR-SCNC: 234 NMOL BCE
COLLAGEN NTX/CREAT UR-SRTO: 22
CREAT SERPL-MCNC: 1.5 MG/DL
CREAT SPEC-SCNC: 136 MG/DL
CREAT UR-MCNC: 119.3 MG/DL
DEPRECATED KAPPA LC FREE/LAMBDA SER: 0.63 RATIO
EGFR: 43 ML/MIN/1.73M2
EOSINOPHIL # BLD AUTO: 0.25 K/UL
EOSINOPHIL NFR BLD AUTO: 4.2 %
GLUCOSE SERPL-MCNC: 91 MG/DL
HCT VFR BLD CALC: 41.9 %
HGB BLD-MCNC: 13.8 G/DL
IMM GRANULOCYTES NFR BLD AUTO: 0.3 %
INTERPRETIVE GUIDE:: NORMAL
KAPPA LC CSF-MCNC: 6.27 MG/DL
KAPPA LC SERPL-MCNC: 3.92 MG/DL
LYMPHOCYTES # BLD AUTO: 1.12 K/UL
LYMPHOCYTES NFR BLD AUTO: 18.9 %
MAGNESIUM SERPL-MCNC: 1.7 MG/DL — SIGNIFICANT CHANGE UP (ref 1.6–2.6)
MAGNESIUM SERPL-MCNC: 1.9 MG/DL
MAN DIFF?: NORMAL
MCHC RBC-ENTMCNC: 31.4 PG
MCHC RBC-ENTMCNC: 32.9 GM/DL
MCV RBC AUTO: 95.4 FL
MICROALBUMIN 24H UR DL<=1MG/L-MCNC: <1.2 MG/DL
MICROALBUMIN/CREAT 24H UR-RTO: NORMAL MG/G
MONOCYTES # BLD AUTO: 0.79 K/UL
MONOCYTES NFR BLD AUTO: 13.3 %
NEUTROPHILS # BLD AUTO: 3.7 K/UL
NEUTROPHILS NFR BLD AUTO: 62.6 %
NT-PROBNP SERPL-MCNC: 2101 PG/ML
PARATHYROID HORMONE INTACT: 60 PG/ML
PHOSPHATE SERPL-MCNC: 2.8 MG/DL
PLATELET # BLD AUTO: 163 K/UL
POTASSIUM SERPL-SCNC: 4.4 MMOL/L
PROT SERPL-MCNC: 6.6 G/DL
RBC # BLD: 4.39 M/UL
RBC # FLD: 13.4 %
SODIUM SERPL-SCNC: 139 MMOL/L
TSH SERPL-ACNC: 3.37 UIU/ML
WBC # FLD AUTO: 5.92 K/UL

## 2024-05-13 PROCEDURE — 99233 SBSQ HOSP IP/OBS HIGH 50: CPT | Mod: GC

## 2024-05-13 PROCEDURE — 99223 1ST HOSP IP/OBS HIGH 75: CPT

## 2024-05-13 PROCEDURE — 93306 TTE W/DOPPLER COMPLETE: CPT | Mod: 26

## 2024-05-13 PROCEDURE — 99222 1ST HOSP IP/OBS MODERATE 55: CPT

## 2024-05-13 RX ORDER — METOPROLOL TARTRATE 50 MG
25 TABLET ORAL DAILY
Refills: 0 | Status: DISCONTINUED | OUTPATIENT
Start: 2024-05-13 | End: 2024-05-15

## 2024-05-13 RX ADMIN — Medication 5 MILLIGRAM(S): at 06:07

## 2024-05-13 RX ADMIN — Medication 12.5 MILLIGRAM(S): at 06:06

## 2024-05-13 RX ADMIN — SODIUM CHLORIDE 75 MILLILITER(S): 9 INJECTION, SOLUTION INTRAVENOUS at 12:09

## 2024-05-13 RX ADMIN — TAMSULOSIN HYDROCHLORIDE 0.8 MILLIGRAM(S): 0.4 CAPSULE ORAL at 21:12

## 2024-05-13 RX ADMIN — Medication 81 MILLIGRAM(S): at 12:09

## 2024-05-13 RX ADMIN — CALCITRIOL 0.25 MICROGRAM(S): 0.5 CAPSULE ORAL at 12:08

## 2024-05-13 RX ADMIN — Medication 1 TABLET(S): at 12:09

## 2024-05-13 RX ADMIN — ENOXAPARIN SODIUM 30 MILLIGRAM(S): 100 INJECTION SUBCUTANEOUS at 06:07

## 2024-05-13 NOTE — PROGRESS NOTE ADULT - ASSESSMENT
Dizziness, chronic but has worsened this week, causing poor balance and fall  Vertebrobasilar Dolichoectasia  - CTH  - MR brain ordered  - neuro consult, cardio consult  - PT    Atrial fibrillation, paroxysmal  - CHADsVasc score of 3    Acute kidney injury, dehydration  - BUN 34, Cr 1.7 on admission  - Cr baseline 1.29  - IV LR@75  - avoid nephrotoxins  - trend renal fxn    Hypertension    Left Bundle branch block  Aortic insufficiency  CKD, mild  BPH    Would d/c amlodipine  Would start Metoprolol 12.5 mg twice daily   Would start aspirin 81 mg/day  No anticoagulant ordered at this time, in view of risk for falls  Echo ordered  MRI brain ordered  FFup labs, check lipid panel  Cardiology consult  Neurology consult  PT eval  DVT prophylaxis       Dizziness, chronic but has worsened this week, causing poor balance and fall  Vertebrobasilar Dolichoectasia  - CTH  - MR brain ordered  - neuro consult, cardio consult  - PT    Atrial fibrillation, paroxysmal  LBBB  - CHADsVasc score of 3    Acute kidney injury, dehydration  CKD, mild  - BUN 34, Cr 1.7 on admission  - Cr baseline 1.29  - IV LR@75  - avoid nephrotoxins  - trend renal fxn    Hypertension  - held home amlodipine  - started metoprolol tartrate 12.5mg q12h  - orthostatics ordered    Aortic insufficiency    BPH  - c/w flomax    DVT ppx      Would d/c amlodipine  Would start Metoprolol 12.5 mg twice daily   Would start aspirin 81 mg/day  No anticoagulant ordered at this time, in view of risk for falls  Echo ordered  MRI brain ordered  FFup labs, check lipid panel  Cardiology consult  Neurology consult  PT eval  DVT prophylaxis       Dizziness, chronic but has worsened this week, causing poor balance and fall  Vertebrobasilar Dolichoectasia  - CTH  - MR brain ordered  - neuro consult, cardio consult  - PT consult  - TTE ordered    Atrial fibrillation, paroxysmal  LBBB  - CHADsVasc score of 3  - f/u TTE    Acute kidney injury, dehydration  CKD, mild  - BUN 34, Cr 1.7 on admission  - Cr baseline 1.29  - IV LR@75  - avoid nephrotoxins  - trend renal fxn    Hypertension  - held home amlodipine  - started metoprolol tartrate 12.5mg q12h  - orthostatics ordered    Aortic insufficiency    BPH  - c/w flomax    DVT ppx  Lovenox    Would d/c amlodipine  Would start Metoprolol 12.5 mg twice daily   Would start aspirin 81 mg/day  No anticoagulant ordered at this time, in view of risk for falls    FFup labs, check lipid panel  Cardiology consult  Neurology consult  PT eval  DVT prophylaxis       Dizziness, chronic but has worsened this week, causing poor balance and fall  Vertebrobasilar Dolichoectasia  - CTH  - MR brain ordered  - ASA 81  - neuro consult, cardio consult  - PT consult  - TTE ordered  - f/u lipid panel    Atrial fibrillation, paroxysmal  LBBB  - CHADsVasc score of 3  - f/u TTE    Acute kidney injury, dehydration  CKD, mild  - BUN 34, Cr 1.7 on admission  - Cr baseline 1.29  - IV LR@75  - avoid nephrotoxins  - trend renal fxn    Hypertension  - held home amlodipine  - started metoprolol tartrate 12.5mg q12h  - orthostatics ordered    Aortic insufficiency    Chronic dermatitis  - patient reports prednisone prescribed by dermatologist for rash, will continue home prednisone 5mg qd    BPH  - c/w flomax    DVT ppx  Lovenox    Discussed with Dr. Mac. 89M h/o HTN, aortic insufficiency, chronic ITP, chronic dermatitits on prednisone, BPH, mild CKD, skin cancer presenting for dizziness found to have paroxysmal afib, admitted for further workup and management.    Dizziness, chronic but has worsened this week, causing poor balance and fall  Vertebrobasilar Dolichoectasia  - CTH vertebrobasilar dolichoectasia with mass effect on right ventral alfred, heavily calcified atheromatous disease both V4 vertebral segments, gen cerebral volume loss, small vessel disease, no acute ICH/midline shift/infarct  - MR brain ordered  - ASA 81  - neuro consult, cardio consult  - PT consult  - TTE ordered  - f/u lipid panel    Atrial fibrillation, paroxysmal  LBBB  - CHADsVasc score of 3  - f/u TTE    Acute kidney injury, dehydration  CKD, mild  - BUN 34, Cr 1.7 on admission  - Cr baseline 1.29  - IV LR@75  - avoid nephrotoxins  - trend renal fxn    Hypertension  Aortic insufficiency, chronic  - held home amlodipine  - started metoprolol tartrate 12.5mg q12h  - orthostatics ordered    Chronic dermatitis  - patient reports prednisone prescribed by dermatologist for rash, will continue home prednisone 5mg qd    BPH  - c/w flomax    DVT ppx  Lovenox    Discussed with Dr. Mac. 89M h/o HTN, aortic insufficiency, chronic ITP, chronic dermatitits on prednisone, BPH, mild CKD, skin cancer presenting for dizziness found to have paroxysmal afib, admitted for further workup and management.    Dizziness, chronic but has worsened this week, causing poor balance and fall  Vertebrobasilar Dolichoectasia  - CTH vertebrobasilar dolichoectasia with mass effect on right ventral alfred, heavily calcified atheromatous disease both V4 vertebral segments, gen cerebral volume loss, small vessel disease, no acute ICH/midline shift/infarct  - MR brain ordered  - ASA 81  - neuro consult, cardio consult  - PT consult  - TTE ordered  - fall precautions  - f/u lipid panel    Atrial fibrillation, paroxysmal  LBBB  - CHADsVasc score of 3  - f/u TTE    Acute kidney injury, dehydration  CKD, mild  - BUN 34, Cr 1.7 on admission  - Cr baseline 1.29  - IV LR@75  - avoid nephrotoxins  - trend renal fxn    Hypertension  Aortic insufficiency, chronic  - held home amlodipine  - started metoprolol tartrate 12.5mg q12h  - orthostatics ordered    Chronic dermatitis  - patient reports prednisone prescribed by dermatologist for rash, will continue home prednisone 5mg qd    BPH  - c/w flomax    DVT ppx  Lovenox    Discussed with Dr. Mac. 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, heavily calcified atheromatous disease both V4 vertebral segments, gen cerebral volume loss, small vessel disease, no acute ICH/midline shift/infarct  - MR brain ordered  - ASA 81  - neuro consult, cardio consult  - PT consult  - TTE ordered  - fall precautions  - f/u lipid panel    Atrial fibrillation, paroxysmal  LBBB  - CHADsVasc score of 3  - f/u TTE    Acute kidney injury, dehydration  CKD, mild  - BUN 34, Cr 1.7 on admission  - Cr baseline 1.29  - IV LR@75  - avoid nephrotoxins  - trend renal fxn    Hypertension  Aortic insufficiency, chronic  - held home amlodipine  - started metoprolol tartrate 12.5mg q12h  - orthostatics ordered    Chronic dermatitis  - patient reports prednisone prescribed by dermatologist for rash, will continue home prednisone 5mg qd    BPH  - c/w flomax    DVT ppx  Lovenox    Discussed with Dr. Mac.

## 2024-05-13 NOTE — CONSULT NOTE ADULT - ASSESSMENT
Assessment:  Smooth Sherman is a 89 year old man with past medical history of Mild cardiomyopathy, Hypertension, Aortic insufficiency, Mild CKD, Chronic ITP who presented with dizziness and generalized weakness, with recent fall, found to have systolic heart failure.       Troponin negative. CXR consistent with clear lungs. CT head consistent with cerebral changes without acute intracranial hemorrhage.     Assessment:  Smooth Sherman is a 89 year old man with past medical history of Mild cardiomyopathy, Hypertension, Aortic insufficiency, Mild CKD, Chronic ITP who presented with dizziness and generalized weakness, with recent fall, found to have systolic heart failure.     The patient reports history of leg edema and dyspnea on exertion for several months now. ECG consistent with sinus rhythm, PACs and LBBB, the LBBB is old. Troponin negative. Echo consistent with LVEF 30-35%, moderate aortic regurgitation, stasis of flow in IVC, no obvious thrombus visualized. Telemetry consistent with sinus rhythm with PACs. CXR consistent with clear lungs. CT head consistent with cerebral changes without acute intracranial hemorrhage.    Recommendations:  [] Systolic heart failure: Likely subacute presentation. Near-euvolemic. In regards to etiology, recommend evaluation for ischemic cardiomyopathy; discussed possible coronary angiogram with patient and family, he is at increased risk due to age of 89 and CKD, will need Nephrology clearance for coronary angiogram. In regards to guideline directed medical therapy, would transition Metoprolol to succinate 25 mg daily. If renal function improves, would consider addition of ARNI and MRA. Will need repeat echo with IV ultrasonic enhancing agent to ensure no thrombus. Continue to monitor on telemetry, no signs of atrial fibrillation at this time. Moderate AR can be monitored at this time, may need structural heart team evaluation.   [] Dizziness: Follow up Neurology, plan for MRI brain.     We will continue to follow along.    Madie Clements MD  Cardiology

## 2024-05-13 NOTE — PHYSICAL THERAPY INITIAL EVALUATION ADULT - ADDITIONAL COMMENTS
Pt lives with his wife in a , at baseline pt is independent with ambulation without at assistive device. Was walking 1 mile a day until a few weeks ago. Gave up driving 1 week ago.  Pt reports dizziness past few weeks which has affected his balance

## 2024-05-13 NOTE — PROGRESS NOTE ADULT - SUBJECTIVE AND OBJECTIVE BOX
Patient is a 89y old  Male who presents with a chief complaint of dizziness, worsening  new atrial fib (13 May 2024 08:06)      INTERVAL HPI/OVERNIGHT EVENTS: Patient seen and examined at bedside. No overnight events.  No tele events overnight.         MEDICATIONS  (STANDING):  aspirin enteric coated 81 milliGRAM(s) Oral daily  calcitriol   Capsule 0.25 MICROGram(s) Oral daily  calcium carbonate 1250 mG  + Vitamin D (OsCal 500 + D) 1 Tablet(s) Oral daily  enoxaparin Injectable 30 milliGRAM(s) SubCutaneous every 24 hours  lactated ringers. 1000 milliLiter(s) (75 mL/Hr) IV Continuous <Continuous>  metoprolol tartrate 12.5 milliGRAM(s) Oral every 12 hours  predniSONE   Tablet 5 milliGRAM(s) Oral daily  tamsulosin 0.8 milliGRAM(s) Oral at bedtime    MEDICATIONS  (PRN):  acetaminophen     Tablet .. 650 milliGRAM(s) Oral every 6 hours PRN Temp greater or equal to 38C (100.4F), Mild Pain (1 - 3)  meclizine 12.5 milliGRAM(s) Oral four times a day PRN Dizziness      Allergies    No Known Allergies    Intolerances        REVIEW OF SYSTEMS:  CONSTITUTIONAL: No fever or chills  HEENT:  No headache, no sore throat  RESPIRATORY: No cough, wheezing, or shortness of breath  CARDIOVASCULAR: No chest pain, palpitations  GASTROINTESTINAL: No abd pain, nausea, vomiting, or diarrhea  GENITOURINARY: No dysuria, frequency, or hematuria  NEUROLOGICAL: no focal weakness or dizziness  MUSCULOSKELETAL: no myalgias     Vital Signs Last 24 Hrs  T(C): 36.4 (13 May 2024 06:30), Max: 36.9 (12 May 2024 15:37)  T(F): 97.6 (13 May 2024 06:30), Max: 98.4 (12 May 2024 15:37)  HR: 88 (13 May 2024 06:30) (85 - 97)  BP: 130/79 (13 May 2024 06:30) (103/76 - 136/57)  BP(mean): --  RR: 20 (13 May 2024 06:30) (18 - 20)  SpO2: 95% (13 May 2024 06:30) (93% - 98%)    Parameters below as of 13 May 2024 06:30  Patient On (Oxygen Delivery Method): room air      I&O's Summary    BMI (kg/m2): 22.4 (24 @ 15:37)    PHYSICAL EXAM:  GENERAL: NAD  HEENT:  AT/NC, moist mucous membranes, EOMI  CHEST/LUNG:  CTA b/l, no rales, wheezes, or rhonchi,  normal respiratory effort  HEART:  RRR, S1, S2, no murmurs; no pitting edema  ABDOMEN:  BS+, soft, nontender, nondistended  MSK/EXTREMITIES: 2+ peripheral pulses, no clubbing or cyanosis  NERVOUS SYSTEM: answers questions and follows commands appropriately, grossly moves all extremities   PSYCH: Appropriate affect, Alert & Awake; Good judgement      LABS: Personally reviewed  CBC                        14.2   5.50  )-----------( 169      ( 12 May 2024 16:15 )             40.8     CMP      136  |  104  |  34  ----------------------------<  121  4.9   |  23  |  1.72    Ca    10.0      12 May 2024 16:15  Mg     2.0         TPro  6.9  /  Alb  3.2  /  TBili  0.9  /  DBili  x   /  AST  18  /  ALT  15  /  AlkPhos  70                  CARDIAC MARKERS ( 12 May 2024 19:27 )  x     / 24.8 ng/L / x     / x     / x            TSH 2.943   TSH with FT4 reflex --  Total T3 --                  Urinalysis Basic - ( 12 May 2024 17:30 )    Color: Yellow / Appearance: Clear / S.018 / pH: x  Gluc: x / Ketone: Negative mg/dL  / Bili: Negative / Urobili: 1.0 mg/dL   Blood: x / Protein: Negative mg/dL / Nitrite: Negative   Leuk Esterase: Negative / RBC: x / WBC x   Sq Epi: x / Non Sq Epi: x / Bacteria: x                RADIOLOGY & ADDITIONAL TESTS: Personally reviewed.   < from: CT Head No Cont (24 @ 16:37) >  There is vertebrobasilar dolichoectasia, with mass effect on the right   ventral alfred. There is heavily calcified atheromatous disease at both V4   vertebral segments.    Moderate generalized cerebral volume loss, with distention of the sulci   and concomitant ex-vacuo ventricular dilatation. Mild-to-moderate   nonspecific low attenuation in the periventricular and subcortical white   matter, likely due to small vessel disease.    No acute intracranial hemorrhage. No midline shift or herniation. No CT   evidence of acute territorial infarction, although MRI with DWI would be   more sensitive.    The visualized sinuses and mastoids are clear.    Bilateral lens implants. Limited views of the orbits and visualized soft   tissues of the neck, face, scalp, skull base, and calvarium are otherwise   unremarkable. There are degenerative changes in the TMJs.    IMPRESSION:    1.  Senescent cerebral changes without acute intracranial hemorrhage.    < end of copied text >      Consultant(s) Notes Reviewed:  [x] YES  [ ] NO   Discussed with LADAN/EITAN, RN       Patient is a 89y old  Male who presents with a chief complaint of dizziness, worsening  new atrial fib (13 May 2024 08:06)      INTERVAL HPI/OVERNIGHT EVENTS: Patient seen and examined at bedside. No overnight events.  No tele events overnight.    MEDICATIONS  (STANDING):  aspirin enteric coated 81 milliGRAM(s) Oral daily  calcitriol   Capsule 0.25 MICROGram(s) Oral daily  calcium carbonate 1250 mG  + Vitamin D (OsCal 500 + D) 1 Tablet(s) Oral daily  enoxaparin Injectable 30 milliGRAM(s) SubCutaneous every 24 hours  lactated ringers. 1000 milliLiter(s) (75 mL/Hr) IV Continuous <Continuous>  metoprolol tartrate 12.5 milliGRAM(s) Oral every 12 hours  predniSONE   Tablet 5 milliGRAM(s) Oral daily  tamsulosin 0.8 milliGRAM(s) Oral at bedtime    MEDICATIONS  (PRN):  acetaminophen     Tablet .. 650 milliGRAM(s) Oral every 6 hours PRN Temp greater or equal to 38C (100.4F), Mild Pain (1 - 3)  meclizine 12.5 milliGRAM(s) Oral four times a day PRN Dizziness      Allergies    No Known Allergies    Intolerances        Vital Signs Last 24 Hrs  T(C): 36.4 (13 May 2024 06:30), Max: 36.9 (12 May 2024 15:37)  T(F): 97.6 (13 May 2024 06:30), Max: 98.4 (12 May 2024 15:37)  HR: 88 (13 May 2024 06:30) (85 - 97)  BP: 130/79 (13 May 2024 06:30) (103/76 - 136/57)  BP(mean): --  RR: 20 (13 May 2024 06:30) (18 - 20)  SpO2: 95% (13 May 2024 06:30) (93% - 98%)    Parameters below as of 13 May 2024 06:30  Patient On (Oxygen Delivery Method): room air      I&O's Summary    BMI (kg/m2): 22.4 (24 @ 15:37)    PHYSICAL EXAM:  GENERAL: NAD  HEENT:  AT/NC, moist mucous membranes, EOMI  CHEST/LUNG:  CTA b/l, no rales, wheezes, or rhonchi,  normal respiratory effort  HEART:  RRR, S1, S2, no murmurs; no pitting edema  ABDOMEN:  BS+, soft, nontender, nondistended  MSK/EXTREMITIES: 2+ peripheral pulses, no clubbing or cyanosis  NERVOUS SYSTEM: answers questions and follows commands appropriately, grossly moves all extremities   PSYCH: Appropriate affect, Alert & Awake; Good judgement      LABS: Personally reviewed  CBC                        14.2   5.50  )-----------( 169      ( 12 May 2024 16:15 )             40.8     CMP      136  |  104  |  34  ----------------------------<  121  4.9   |  23  |  1.72    Ca    10.0      12 May 2024 16:15  Mg     2.0         TPro  6.9  /  Alb  3.2  /  TBili  0.9  /  DBili  x   /  AST  18  /  ALT  15  /  AlkPhos  70                  CARDIAC MARKERS ( 12 May 2024 19:27 )  x     / 24.8 ng/L / x     / x     / x            TSH 2.943   TSH with FT4 reflex --  Total T3 --                  Urinalysis Basic - ( 12 May 2024 17:30 )    Color: Yellow / Appearance: Clear / S.018 / pH: x  Gluc: x / Ketone: Negative mg/dL  / Bili: Negative / Urobili: 1.0 mg/dL   Blood: x / Protein: Negative mg/dL / Nitrite: Negative   Leuk Esterase: Negative / RBC: x / WBC x   Sq Epi: x / Non Sq Epi: x / Bacteria: x                RADIOLOGY & ADDITIONAL TESTS: Personally reviewed.   < from: CT Head No Cont (24 @ 16:37) >  There is vertebrobasilar dolichoectasia, with mass effect on the right   ventral alfred. There is heavily calcified atheromatous disease at both V4   vertebral segments.    Moderate generalized cerebral volume loss, with distention of the sulci   and concomitant ex-vacuo ventricular dilatation. Mild-to-moderate   nonspecific low attenuation in the periventricular and subcortical white   matter, likely due to small vessel disease.    No acute intracranial hemorrhage. No midline shift or herniation. No CT   evidence of acute territorial infarction, although MRI with DWI would be   more sensitive.    The visualized sinuses and mastoids are clear.    Bilateral lens implants. Limited views of the orbits and visualized soft   tissues of the neck, face, scalp, skull base, and calvarium are otherwise   unremarkable. There are degenerative changes in the TMJs.    IMPRESSION:    1.  Senescent cerebral changes without acute intracranial hemorrhage.    < end of copied text >      Consultant(s) Notes Reviewed:  [x] YES  [ ] NO   Discussed with LADAN/EITAN RN

## 2024-05-13 NOTE — CONSULT NOTE ADULT - SUBJECTIVE AND OBJECTIVE BOX
Neurology consult    BERNARDINO [id number]    HPI:  Mr Sherman is a 89-year-old male with hypertension, aortic insufficiency, chronic ITP, chronic dermatitis on chronic Prednisone 5 mg/day, BPH, mild CKD, skin cancer, presents to the emergency department from home complaining of worsening dizziness and weakness this week.  As per wife, patient apparently has been having dizziness for months, he had seen a Neurologist about 8 months ago, had MRI, was told it was unremarkable. Pt has also been following up with his PCP and BP meds were also switched at some point but it also did not resolve the dizziness. Patient states he gets dizzy whenever he moves and he apparently fell last  week because of this, since he also lost his balance.  There was no head injury nor loss of consciousness.  He denies chest pain, dyspnea, nausea, vomiting, abdominal pain.  Pt reports that he just stayed in bed today because of the worsening dizziness. He states he had palpitations today.   On tele monitor in ED, pt noted to be in and out of afib.     Labs significant for BUN/crea 34/1.7  (baseline creat is 1.29).  1st EKG showed sinus rhythm, 98/min with PAC's, LBBB.  2nd EKG showed atrial fib with /min, LBBB.   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 There is heavily calcified atheromatous disease at both V4 vertebral segments. Moderate generalized cerebral volume loss, with distention of the sulci concomitant ex-vacuo ventricular dilatation. Mild-to-moderate nonspecific low attenuation in the periventricular and subcortical white matter, likely due to small vessel disease. No acute intracranial hemorrhage. No midline shift or herniation. No CT   evidence of acute territorial infarction,  Cxray is unremarkable.  (12 May 2024 20:19)          MEDICATIONS    acetaminophen     Tablet .. 650 milliGRAM(s) Oral every 6 hours PRN  aspirin enteric coated 81 milliGRAM(s) Oral daily  calcitriol   Capsule 0.25 MICROGram(s) Oral daily  calcium carbonate 1250 mG  + Vitamin D (OsCal 500 + D) 1 Tablet(s) Oral daily  enoxaparin Injectable 30 milliGRAM(s) SubCutaneous every 24 hours  lactated ringers. 1000 milliLiter(s) IV Continuous <Continuous>  meclizine 12.5 milliGRAM(s) Oral four times a day PRN  metoprolol tartrate 12.5 milliGRAM(s) Oral every 12 hours  predniSONE   Tablet 5 milliGRAM(s) Oral daily  tamsulosin 0.8 milliGRAM(s) Oral at bedtime       Family history: No history of dementia, strokes, or seizures   FAMILY HISTORY:    SOCIAL HISTORY -- No history of tobacco or alcohol use     Allergies    No Known Allergies          Height (cm): 177.8 (05-12 @ 15:37)  Weight (kg): 70.76 (05-12 @ 15:37)  BMI (kg/m2): 22.4 (05-12 @ 15:37)    Vital Signs Last 24 Hrs  T(C): 36.5 (13 May 2024 11:49), Max: 36.9 (12 May 2024 15:37)  T(F): 97.7 (13 May 2024 11:49), Max: 98.4 (12 May 2024 15:37)  HR: 80 (13 May 2024 11:49) (80 - 97)  BP: 120/66 (13 May 2024 11:49) (103/76 - 136/57)  BP(mean): --  RR: 20 (13 May 2024 11:49) (18 - 20)  SpO2: 96% (13 May 2024 11:49) (93% - 98%)    Parameters below as of 13 May 2024 11:49  Patient On (Oxygen Delivery Method): room air          REVIEW OF SYSTEMS: hearing problem, needed hearing aid.       On Neurological Examination:    Mental Status - Patient is alert, awake, oriented X3.   Follows commands well and able to answer questions appropriately. Mood and affect  normal  Speech -   Fluent, No Aphasia                              Cranial Nerves - Pupils 3 mm equal and reactive to light, extraocular eye movements intact, face symmetric, tongue midline.   No nystagmus.   normal FTN, VAL    Motor Exam - 5/5   Muscle tone - is normal all over. No asymmetry is seen.      Sensory    Bilateral intact to light touch               LABS:  CBC Full  -  ( 12 May 2024 16:15 )  WBC Count : 5.50 K/uL  RBC Count : 4.45 M/uL  Hemoglobin : 14.2 g/dL  Hematocrit : 40.8 %  Platelet Count - Automated : 169 K/uL  Mean Cell Volume : 91.7 fl  Mean Cell Hemoglobin : 31.9 pg  Mean Cell Hemoglobin Concentration : 34.8 gm/dL  Auto Neutrophil # : 3.96 K/uL  Auto Lymphocyte # : 0.81 K/uL  Auto Monocyte # : 0.65 K/uL  Auto Eosinophil # : 0.05 K/uL  Auto Basophil # : 0.02 K/uL  Auto Neutrophil % : 72.0 %  Auto Lymphocyte % : 14.7 %  Auto Monocyte % : 11.8 %  Auto Eosinophil % : 0.9 %  Auto Basophil % : 0.4 %    05-12    136  |  104  |  34<H>  ----------------------------<  121<H>  4.9   |  23  |  1.72<H>    Ca    10.0      12 May 2024 16:15  Mg     2.0     05-12    TPro  6.9  /  Alb  3.2<L>  /  TBili  0.9  /  DBili  x   /  AST  18  /  ALT  15  /  AlkPhos  70  05-12      ACC: [id number] EXAM:  CT BRAIN   ORDERED BY: ZAIN ARAIZA     PROCEDURE DATE:  05/12/2024          INTERPRETATION:  EXAMINATION: CT HEAD    CLINICAL INDICATION: weakness; dizziness  TECHNIQUE: CT images of the head were obtained without contrast.Coronal   and sagittal reconstructions were performed. Dose reduction techniques   were utilized including but not limited to automated exposure control   (AEC), iterative reconstruction technique, and/or mA and/or kV dose   adjustments based on patient size.  COMPARISON: None.    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 There is heavily calcified atheromatous disease at both V4   vertebral segments.    Moderate generalized cerebral volume loss, with distention of the sulci   and concomitant ex-vacuo ventricular dilatation. Mild-to-moderate   nonspecific low attenuation in the periventricular and subcortical white   matter, likely due to small vessel disease.    No acute intracranial hemorrhage. No midline shift or herniation. No CT   evidence of acute territorial infarction, although MRI with DWI would be   more sensitive.    The visualized sinuses and mastoids are clear.    Bilateral lens implants. Limited views of the orbits and visualized soft   tissues of the neck, face, scalp, skull base, and calvarium are otherwise   unremarkable. There are degenerative changes in the TMJs.    IMPRESSION:    1.  Senescent cerebral changes without acute intracranial hemorrhage.        Patient Name: BERNARDINO SHERMAN  MRN: [id number], Accession: [id number]  DOS: 05/12/24 04:37 PM    --- End of Report ---            DERIK CANALES MD; Attending Radiologist  This document has been electronically signed. May 12 2024  5:09PM

## 2024-05-13 NOTE — CONSULT NOTE ADULT - TIME BILLING
chart reviewed, face to face with patient for history, physical exam, discussed possible diagnosis, treatment plan. discussed with primary team, documentation

## 2024-05-13 NOTE — PHYSICAL THERAPY INITIAL EVALUATION ADULT - PERTINENT HX OF CURRENT PROBLEM, REHAB EVAL
Mr Sherman is a 89-year-old male with hypertension, aortic insufficiency, chronic ITP, chronic dermatitis on chronic Prednisone 5 mg/day, BPH, mild CKD, skin cancer, presents to the emergency department from home complaining of worsening dizziness and weakness this week.  As per wife, patient apparently has been having dizziness for months, he had seen a Neurologist about 8 months ago, had MRI, was told it was unremarkable. Pt has also been following up with his PCP and BP meds were also switched at some point but it also did not resolve the dizziness. Patient states he gets dizzy whenever he moves and he apparently fell last  week because of this, since he also lost his balance.  There was no head injury nor loss of consciousness.  He denies chest pain, dyspnea, nausea, vomiting, abdominal pain.  Pt reports that he just stayed in bed today because of the worsening dizziness. He states he had palpitations today.   On tele monitor in ED, pt noted to be in and out of afib.

## 2024-05-13 NOTE — CONSULT NOTE ADULT - SUBJECTIVE AND OBJECTIVE BOX
SMOOTH SHERMAN  [id number]      HPI:    Smooth Sherman is a 89 year old man with past medical history of Mild cardiomyopathy, Hypertension, Aortic insufficiency, Mild CKD, Chronic ITP who presented with dizziness and generalized weakness, with recent fall.      ALLERGIES:  No Known Allergies    CURRENT MEDICATIONS:  acetaminophen     Tablet .. 650 milliGRAM(s) Oral every 6 hours PRN  aspirin enteric coated 81 milliGRAM(s) Oral daily  calcitriol   Capsule 0.25 MICROGram(s) Oral daily  calcium carbonate 1250 mG  + Vitamin D (OsCal 500 + D) 1 Tablet(s) Oral daily  enoxaparin Injectable 30 milliGRAM(s) SubCutaneous every 24 hours  lactated ringers. 1000 milliLiter(s) IV Continuous <Continuous>  meclizine 12.5 milliGRAM(s) Oral four times a day PRN  metoprolol tartrate 12.5 milliGRAM(s) Oral every 12 hours  predniSONE   Tablet 5 milliGRAM(s) Oral daily  tamsulosin 0.8 milliGRAM(s) Oral at bedtime      ROS:  All 10 systems reviewed and positives noted in HPI    OBJECTIVE:    VITAL SIGNS:  Vital Signs Last 24 Hrs  T(C): 36.5 (13 May 2024 11:49), Max: 36.9 (12 May 2024 15:37)  T(F): 97.7 (13 May 2024 11:49), Max: 98.4 (12 May 2024 15:37)  HR: 80 (13 May 2024 11:49) (80 - 97)  BP: 120/66 (13 May 2024 11:49) (103/76 - 136/57)  BP(mean): --  RR: 20 (13 May 2024 11:49) (18 - 20)  SpO2: 96% (13 May 2024 11:49) (93% - 98%)    Parameters below as of 13 May 2024 11:49  Patient On (Oxygen Delivery Method): room air        PHYSICAL EXAM:  General: well appearing, no distress  HEENT: sclera anicteric  Neck: supple, no carotid bruits b/l  CVS: JVP ~ 7 cm H20, RRR, s1, s2, no murmurs/rubs/gallops  Chest: unlabored respirations, clear to auscultation b/l  Abdomen: non-distended  Extremities: no lower extremity edema b/l  Neuro: awake, alert & oriented x 3  Psych: normal affect      LABS:                        14.2   5.50  )-----------( 169      ( 12 May 2024 16:15 )             40.8     05-12    136  |  104  |  34<H>  ----------------------------<  121<H>  4.9   |  23  |  1.72<H>    Ca    10.0      12 May 2024 16:15  Mg     2.0     05-12    TPro  6.9  /  Alb  3.2<L>  /  TBili  0.9  /  DBili  x   /  AST  18  /  ALT  15  /  AlkPhos  70  05-12        Nuclear stress test (2019):  Normal     Outpatient TTE (3/2023):  Mildly impaired LV systolic function  Moderate AR, Mild-moderate TR      TTE (5/13/24):   1. Moderately decreased global left ventricular systolic function.   2. Left ventricular ejection fraction, by visual estimation, is 30 to 35%.   3. Moderate global left ventricle hypokinesis. Consider use of IV   ultrasonic enhancing agent to better evaluate regional wall motion and apex, if clinically indicated.   4. Mildly increased left ventricular internal cavity size.   5. Mildly increased LV wall thickness.   6. Mildly enlarged right ventricle with normal right ventricle systolic   function.   7. Right atrial enlargement.   8. Degenerative mitral valve.   9. Moderate thickening of the anterior and posterior mitral valve   leaflets.  10. Mild mitral valve regurgitation.  11. Mild tricuspid regurgitation.  12. Aortic valve leaflet calcification. No aortic valve stenosis.  13. Moderate aortic regurgitation.  14. Top normal sized aorta at the Sinuses of Valsalva (3.8 cm). Top   normal proximal ascending aorta (3.9 cm).  15. Stasis of flow noted in the IVC with increased risk for thrombus   formation. Recommend clinical correlation.  16. There is no evidence of pericardial effusion.    ECG (5/12/24): sinus rhythm, PACs, LBBB (LBBB present on outpatient ECG)           SMOOTH SHERMAN  [id number]      HPI:    Smooth Sherman is a 89 year old man with past medical history of Mild cardiomyopathy, Hypertension, Aortic insufficiency, Mild CKD, Chronic ITP who presented with dizziness and generalized weakness, with recent fall.      ALLERGIES:  No Known Allergies    CURRENT MEDICATIONS:  acetaminophen     Tablet .. 650 milliGRAM(s) Oral every 6 hours PRN  aspirin enteric coated 81 milliGRAM(s) Oral daily  calcitriol   Capsule 0.25 MICROGram(s) Oral daily  calcium carbonate 1250 mG  + Vitamin D (OsCal 500 + D) 1 Tablet(s) Oral daily  enoxaparin Injectable 30 milliGRAM(s) SubCutaneous every 24 hours  lactated ringers. 1000 milliLiter(s) IV Continuous <Continuous>  meclizine 12.5 milliGRAM(s) Oral four times a day PRN  metoprolol tartrate 12.5 milliGRAM(s) Oral every 12 hours  predniSONE   Tablet 5 milliGRAM(s) Oral daily  tamsulosin 0.8 milliGRAM(s) Oral at bedtime      ROS:  All 10 systems reviewed and positives noted in HPI    OBJECTIVE:    VITAL SIGNS:  Vital Signs Last 24 Hrs  T(C): 36.5 (13 May 2024 11:49), Max: 36.9 (12 May 2024 15:37)  T(F): 97.7 (13 May 2024 11:49), Max: 98.4 (12 May 2024 15:37)  HR: 80 (13 May 2024 11:49) (80 - 97)  BP: 120/66 (13 May 2024 11:49) (103/76 - 136/57)  BP(mean): --  RR: 20 (13 May 2024 11:49) (18 - 20)  SpO2: 96% (13 May 2024 11:49) (93% - 98%)    Parameters below as of 13 May 2024 11:49  Patient On (Oxygen Delivery Method): room air        PHYSICAL EXAM:  General: no distress  HEENT: sclera anicteric  Neck: supple  CVS: JVP ~ 7 cm H20, RRR, s1, s2  Chest: unlabored respirations, clear to auscultation b/l  Abdomen: non-distended  Extremities: no lower extremity edema b/l  Neuro: awake, alert & oriented  Psych: normal affect      LABS:                        14.2   5.50  )-----------( 169      ( 12 May 2024 16:15 )             40.8     05-12    136  |  104  |  34<H>  ----------------------------<  121<H>  4.9   |  23  |  1.72<H>    Ca    10.0      12 May 2024 16:15  Mg     2.0     05-12    TPro  6.9  /  Alb  3.2<L>  /  TBili  0.9  /  DBili  x   /  AST  18  /  ALT  15  /  AlkPhos  70  05-12        Nuclear stress test (2019):  Normal     Outpatient TTE (3/2023):  Mildly impaired LV systolic function  Moderate AR, Mild-moderate TR      TTE (5/13/24):   1. Moderately decreased global left ventricular systolic function.   2. Left ventricular ejection fraction, by visual estimation, is 30 to 35%.   3. Moderate global left ventricle hypokinesis. Consider use of IV   ultrasonic enhancing agent to better evaluate regional wall motion and apex, if clinically indicated.   4. Mildly increased left ventricular internal cavity size.   5. Mildly increased LV wall thickness.   6. Mildly enlarged right ventricle with normal right ventricle systolic   function.   7. Right atrial enlargement.   8. Degenerative mitral valve.   9. Moderate thickening of the anterior and posterior mitral valve   leaflets.  10. Mild mitral valve regurgitation.  11. Mild tricuspid regurgitation.  12. Aortic valve leaflet calcification. No aortic valve stenosis.  13. Moderate aortic regurgitation.  14. Top normal sized aorta at the Sinuses of Valsalva (3.8 cm). Top   normal proximal ascending aorta (3.9 cm).  15. Stasis of flow noted in the IVC with increased risk for thrombus   formation. Recommend clinical correlation.  16. There is no evidence of pericardial effusion.    ECG (5/12/24): sinus rhythm, PACs, LBBB (LBBB present on outpatient ECG)

## 2024-05-13 NOTE — CONSULT NOTE ADULT - ASSESSMENT
Mr. Sherman is a 89 yo man presented to the hospital for dizziness. He reports he has dizziness when he gets up. He had this issue in the past, was seen by audiologist and was given hearing aid.  Exam today is non-focal.  likely peripheral in origin but he has stroke risk factors, will recommend MRI brain. continue Aspirin 81 mg for now.  Newly diagnosed paroxysmal Afib: need to discuss with patient about AC. consult cardiology. can dc aspirin if patient is ok on AC.   The rest of care per primary team

## 2024-05-13 NOTE — PHYSICAL THERAPY INITIAL EVALUATION ADULT - GAIT TRAINING, PT EVAL
In 1-3 therapy sessions pt will be able to ambulate 100 ft with supervision and with appropriate assistive device.

## 2024-05-13 NOTE — GOALS OF CARE CONVERSATION - ADVANCED CARE PLANNING - CONVERSATION DETAILS
Met with patient at bedside. He is A&Ox3. Wife Riya also at bedside. Pt. here from home with increase weakness and dizziness. Fell at home last week. Pt. has hx. chronic ITP, HTN, BPH, skin cancer, and Grovers disease. Found to have afib and LBBB.   Pt. states his wife is his HCP, she concurs. I also discussed GOC with him. I reviewed risks such as pain during chest compressions, and possibility of cracked ribs.  He stated he would want attempted resuscitation and intubation in the event of cardiac arrest; Full Code.

## 2024-05-14 ENCOUNTER — TRANSCRIPTION ENCOUNTER (OUTPATIENT)
Age: 89
End: 2024-05-14

## 2024-05-14 ENCOUNTER — APPOINTMENT (OUTPATIENT)
Dept: INTERNAL MEDICINE | Facility: CLINIC | Age: 89
End: 2024-05-14

## 2024-05-14 LAB
ANION GAP SERPL CALC-SCNC: 8 MMOL/L — SIGNIFICANT CHANGE UP (ref 5–17)
BUN SERPL-MCNC: 23 MG/DL — SIGNIFICANT CHANGE UP (ref 7–23)
CALCIUM SERPL-MCNC: 9.2 MG/DL — SIGNIFICANT CHANGE UP (ref 8.4–10.5)
CHLORIDE SERPL-SCNC: 107 MMOL/L — SIGNIFICANT CHANGE UP (ref 96–108)
CHOLEST SERPL-MCNC: 111 MG/DL — SIGNIFICANT CHANGE UP
CO2 SERPL-SCNC: 25 MMOL/L — SIGNIFICANT CHANGE UP (ref 22–31)
CREAT SERPL-MCNC: 1.34 MG/DL — HIGH (ref 0.5–1.3)
EGFR: 49 ML/MIN/1.73M2 — LOW
GLUCOSE SERPL-MCNC: 101 MG/DL — HIGH (ref 70–99)
HCT VFR BLD CALC: 37.4 % — LOW (ref 39–50)
HDLC SERPL-MCNC: 40 MG/DL — LOW
HGB BLD-MCNC: 12.9 G/DL — LOW (ref 13–17)
LIPID PNL WITH DIRECT LDL SERPL: 60 MG/DL — SIGNIFICANT CHANGE UP
MAGNESIUM SERPL-MCNC: 1.6 MG/DL — SIGNIFICANT CHANGE UP (ref 1.6–2.6)
MCHC RBC-ENTMCNC: 31.7 PG — SIGNIFICANT CHANGE UP (ref 27–34)
MCHC RBC-ENTMCNC: 34.5 GM/DL — SIGNIFICANT CHANGE UP (ref 32–36)
MCV RBC AUTO: 91.9 FL — SIGNIFICANT CHANGE UP (ref 80–100)
NON HDL CHOLESTEROL: 72 MG/DL — SIGNIFICANT CHANGE UP
NRBC # BLD: 0 /100 WBCS — SIGNIFICANT CHANGE UP (ref 0–0)
PLATELET # BLD AUTO: 157 K/UL — SIGNIFICANT CHANGE UP (ref 150–400)
POTASSIUM SERPL-MCNC: 4.2 MMOL/L — SIGNIFICANT CHANGE UP (ref 3.5–5.3)
POTASSIUM SERPL-SCNC: 4.2 MMOL/L — SIGNIFICANT CHANGE UP (ref 3.5–5.3)
RBC # BLD: 4.07 M/UL — LOW (ref 4.2–5.8)
RBC # FLD: 13.2 % — SIGNIFICANT CHANGE UP (ref 10.3–14.5)
SODIUM SERPL-SCNC: 140 MMOL/L — SIGNIFICANT CHANGE UP (ref 135–145)
TRIGL SERPL-MCNC: 50 MG/DL — SIGNIFICANT CHANGE UP
WBC # BLD: 10.73 K/UL — HIGH (ref 3.8–10.5)
WBC # FLD AUTO: 10.73 K/UL — HIGH (ref 3.8–10.5)

## 2024-05-14 PROCEDURE — 70551 MRI BRAIN STEM W/O DYE: CPT | Mod: 26

## 2024-05-14 PROCEDURE — 99233 SBSQ HOSP IP/OBS HIGH 50: CPT

## 2024-05-14 PROCEDURE — 99232 SBSQ HOSP IP/OBS MODERATE 35: CPT

## 2024-05-14 RX ORDER — ENOXAPARIN SODIUM 100 MG/ML
40 INJECTION SUBCUTANEOUS
Refills: 0 | Status: DISCONTINUED | OUTPATIENT
Start: 2024-05-15 | End: 2024-05-15

## 2024-05-14 RX ADMIN — Medication 1 TABLET(S): at 12:20

## 2024-05-14 RX ADMIN — Medication 5 MILLIGRAM(S): at 05:13

## 2024-05-14 RX ADMIN — TAMSULOSIN HYDROCHLORIDE 0.8 MILLIGRAM(S): 0.4 CAPSULE ORAL at 21:12

## 2024-05-14 RX ADMIN — ENOXAPARIN SODIUM 30 MILLIGRAM(S): 100 INJECTION SUBCUTANEOUS at 05:12

## 2024-05-14 RX ADMIN — Medication 25 MILLIGRAM(S): at 05:12

## 2024-05-14 RX ADMIN — Medication 81 MILLIGRAM(S): at 12:20

## 2024-05-14 RX ADMIN — CALCITRIOL 0.25 MICROGRAM(S): 0.5 CAPSULE ORAL at 12:20

## 2024-05-14 NOTE — DISCHARGE NOTE PROVIDER - ATTENDING DISCHARGE PHYSICAL EXAMINATION:
GENERAL: NAD  HEENT:  AT/NC, moist mucous membranes, EOMI  CHEST/LUNG:  CTA b/l, no rales, wheezes, or rhonchi,  normal respiratory effort  HEART:  RRR, S1, S2, no murmurs; no pitting edema  ABDOMEN:  BS+, soft, nontender, nondistended  MSK/EXTREMITIES: 2+ peripheral pulses, no clubbing or cyanosis  NERVOUS SYSTEM: answers questions and follows commands appropriately, grossly moves all extremities   PSYCH: Appropriate affect, Alert & Awake; Good judgement

## 2024-05-14 NOTE — DIETITIAN INITIAL EVALUATION ADULT - NS FNS DIET ORDER
Diet, DASH/TLC:   Sodium & Cholesterol Restricted  1500mL Fluid Restriction ([id number])     Special Instructions for Nursin milliLiter(s) to 2000 milliLiter(s) fluid restriction (24 @ 15:26)

## 2024-05-14 NOTE — DISCHARGE NOTE PROVIDER - NSDCFUSCHEDAPPT_GEN_ALL_CORE_FT
[facility]  INT[facility] [address] [facility]  Scheduled Appointment: 05/17/2024    [facility]  CARDIOLOGY [address]  Scheduled Appointment: 05/22/2024     [facility]  [facility]  Scheduled Appointment: 05/17/2024    [facility]  CARDIOLOGY [address]  Scheduled Appointment: 05/23/2024

## 2024-05-14 NOTE — DISCHARGE NOTE PROVIDER - NSDCHHENCOUNTER_GEN_ALL_CORE
15-May-2024 RISK                                                          Points  [] Previous VTE                                           3  [] Thrombophilia                                        2  [] Lower limb paralysis                              2   [] Current Cancer                                       2   [x] Immobilization > 24 hrs                        1  [] ICU/CCU stay > 24 hours                       1  [x] Age > 60                                                   1    sc heparin

## 2024-05-14 NOTE — PROGRESS NOTE ADULT - ATTENDING COMMENTS
89M h/o HTN, aortic insufficiency, chronic ITP, chronic dermatitits on prednisone, BPH, mild CKD, skin cancer presenting for dizziness found to have paroxysmal afib, admitted for intractable dizziness and millie Plan: apprec cardio and neuro recs, monitor clinical course, trend renal function, apprec PT eval, for cardiac cath pending pt discussion with daughters, apprec renal recs, poss dc home vs transfer tomorrow
89M h/o HTN, aortic insufficiency, chronic ITP, chronic dermatitits on prednisone, BPH, mild CKD, skin cancer presenting for dizziness found to have paroxysmal afib, admitted for intractable dizziness and millie Plan: apprec cardio and neuro recs, monitor clinical course, trend renal function, apprec PT eval

## 2024-05-14 NOTE — DIETITIAN INITIAL EVALUATION ADULT - PERTINENT LABORATORY DATA
05-14    140  |  107  |  23  ----------------------------<  101<H>  4.2   |  25  |  1.34<H>    Ca    9.2      14 May 2024 08:16  Mg     1.6     05-14    TPro  6.9  /  Alb  3.2<L>  /  TBili  0.9  /  DBili  x   /  AST  18  /  ALT  15  /  AlkPhos  70  05-12

## 2024-05-14 NOTE — DIETITIAN INITIAL EVALUATION ADULT - ETIOLOGY
related to inadequate protein-energy intake in setting of advanced age, loss of taste, HF/dizziness

## 2024-05-14 NOTE — DIETITIAN INITIAL EVALUATION ADULT - ADD RECOMMEND
1. Continue DASH/TLC, defer fluid restriction to medical team  2. Add Ensure HP qd to optimize intake  3. Ongoing heart failure nutrition therapy education

## 2024-05-14 NOTE — DISCHARGE NOTE PROVIDER - HOSPITAL COURSE
Hospital Course  HPI:  Mr Sherman is a 89-year-old male with hypertension, aortic insufficiency, chronic ITP, chronic dermatitis on chronic Prednisone 5 mg/day, BPH, mild CKD, skin cancer, presented to [facility] from home with c/o dizziness over several months worsening over the past week. In the ED pt was on tele monitor and noted to be in and out of afib.  Labs significant for BUN/crea 34/1.7 (baseline creat is 1.29). 1st EKG showed sinus rhythm, 98/min with PAC's, LBBB.  2nd EKG showed atrial fib with /min, LBBB. A CXR showed clear lungs.  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 There is heavily calcified atheromatous disease at both V4 vertebral segments. Moderate generalized cerebral volume loss, with distention of the sulci concomitant ex-vacuo ventricular dilatation. Mild-to-moderate nonspecific low attenuation in the periventricular and subcortical white matter, likely due to small vessel disease. There was no evidence of acute ICH, midline shift, or acute territorial infarction.  Neurology was consulted and recommended an MRI which revealed dolichoectasia most evident at the left vertebral artery and basilar artery, as well as age-related changes.  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    You were admitted for   You were diagnosed with   You were treated with   You were prescribed the following new medications:    You will need to follow up with your primary care physician.    Source of Infection:  Antibiotic / Last Day:    Palliative Care / Advanced Care Planning  Code Status:  Patient/Family agreeable to Hospice/Palliative (Y/N)?  Summary of Goals of Care Conversation:    Discharging Provider:    Contact Info: [phone]  Please call with any questions or concerns.    Outpatient Provider:    Signout given to  SNF Provider:       Hospital Course  HPI:  Mr Sherman is a 89-year-old male with hypertension, aortic insufficiency, chronic ITP, chronic dermatitis on chronic Prednisone 5 mg/day, BPH, mild CKD, skin cancer, presented to [facility] from home with c/o dizziness over several months worsening over the past week. In the ED pt was on tele monitor and noted to be in and out of afib.  Labs significant for BUN/crea 34/1.7 (baseline creat is 1.29). 1st EKG showed sinus rhythm, 98/min with PAC's, LBBB.  2nd EKG showed atrial fib with /min, LBBB. A CXR showed clear lungs.  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 There is heavily calcified atheromatous disease at both V4 vertebral segments. Moderate generalized cerebral volume loss, with distention of the sulci concomitant ex-vacuo ventricular dilatation. Mild-to-moderate nonspecific low attenuation in the periventricular and subcortical white matter, likely due to small vessel disease. There was no evidence of acute ICH, midline shift, or acute territorial infarction.  Neurology was consulted and recommended an MRI which revealed dolichoectasia most evident at the left vertebral artery and basilar artery, as well as age-related changes.  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  Patient's amlodipine was discontinued.   GDMT was started as tolerated; He was started on metoprolol succinate 25mg qd. Patient's Cr improved to 1.24 and he was started on entresto 24/26mg bid.  He was seen by PT who recommended home PT.  He underwent a repeat echo with IV ultrasonic enhancing agent.  Patient should follow up with his outpatient PCP and cardiologist; he should have labs drawn within 1 week to monitor kidney function while on entresto. He is otherwise medically optimized for discharge home with home PT.    You were admitted for   You were diagnosed with   You were treated with   You were prescribed the following new medications:    You will need to follow up with your primary care physician.    Source of Infection:  Antibiotic / Last Day:    Palliative Care / Advanced Care Planning  Code Status:  Patient/Family agreeable to Hospice/Palliative (Y/N)?  Summary of Goals of Care Conversation:    Discharging Provider:    Contact Info: [phone]  Please call with any questions or concerns.    Outpatient Provider:    Signout given to  SNF Provider:       Hospital Course  HPI:  Mr Sherman is a 89-year-old male with hypertension, aortic insufficiency, chronic ITP, chronic dermatitis on chronic Prednisone 5 mg/day, BPH, mild CKD, skin cancer, presented to [facility] from home with c/o dizziness over several months worsening over the past week. In the ED pt was on tele monitor and noted to be in and out of afib.  Labs significant for BUN/crea 34/1.7 (baseline creat is 1.29). 1st EKG showed sinus rhythm, 98/min with PAC's, LBBB.  2nd EKG showed atrial fib with /min, LBBB. A CXR showed clear lungs.  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 There is heavily calcified atheromatous disease at both V4 vertebral segments. Moderate generalized cerebral volume loss, with distention of the sulci concomitant ex-vacuo ventricular dilatation. Mild-to-moderate nonspecific low attenuation in the periventricular and subcortical white matter, likely due to small vessel disease. There was no evidence of acute ICH, midline shift, or acute territorial infarction.  Neurology was consulted and recommended an MRI which revealed dolichoectasia most evident at the left vertebral artery and basilar artery, as well as age-related changes.  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  Patient's amlodipine was discontinued.   GDMT was started as tolerated; He was started on metoprolol succinate 25mg qd. Patient's Cr improved to 1.24 and he was started on entresto 24/26mg bid.  He was seen by PT who recommended home PT.  He underwent a repeat echo with IV ultrasonic enhancing agent.  Patient should follow up with his outpatient PCP and cardiologist; he should have labs drawn within 1 week to monitor kidney function while on entresto. He is otherwise medically optimized for discharge home with home PT.    You were admitted for dizziness, ASHER  You were diagnosed with systolic heart failure  You were treated with metoprolol succinate, entresto  You were prescribed the following new medications: metoprolol succinate 25mg qd, entresto 24mg-26mg q12h    You will need to follow up with your primary care physician and cardiologist.      Discharging Provider:  Celio Smith MD  Contact Info: [phone]  Please call with any questions or concerns.   Hospital Course  HPI:  Mr Shermna is a 89-year-old male with hypertension, aortic insufficiency, chronic ITP, chronic dermatitis on chronic Prednisone 5 mg/day, BPH, mild CKD, skin cancer, presented to [facility] from home with c/o dizziness over several months worsening over the past week. In the ED pt was on tele monitor and noted to be in and out of afib.  Labs significant for BUN/crea 34/1.7 (baseline creat is 1.29). 1st EKG showed sinus rhythm, 98/min with PAC's, LBBB.  2nd EKG showed atrial fib with /min, LBBB. A CXR showed clear lungs.  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 There is heavily calcified atheromatous disease at both V4 vertebral segments. Moderate generalized cerebral volume loss, with distention of the sulci concomitant ex-vacuo ventricular dilatation. Mild-to-moderate nonspecific low attenuation in the periventricular and subcortical white matter, likely due to small vessel disease. There was no evidence of acute ICH, midline shift, or acute territorial infarction.  Neurology was consulted and recommended an MRI which revealed dolichoectasia most evident at the left vertebral artery and basilar artery, as well as age-related changes.  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  Patient's amlodipine was discontinued.   Cardio recommended further evaluation for ischemic cardiomyopathy; coronary angiogram was discussed, however patient does not wish to proceed with an angiogram, and he expressed appropriate understanding of the risks of unrevascularized CAD and fatal arrhythmias. He agreed to pursue angio as an outpatient.  GDMT was started as tolerated; He was started on metoprolol succinate 25mg qd. Patient's Cr improved to 1.24 and he was started on entresto 24/26mg bid.  He was seen by PT who recommended home PT.  He underwent a repeat echo with IV ultrasonic enhancing agent.  Patient should follow up with his outpatient PCP and cardiologist; he should have labs drawn within 1 week to monitor kidney function while on entresto. He is otherwise medically optimized for discharge home with home PT.    You were admitted for dizziness, ASHER  You were diagnosed with systolic heart failure  You were treated with metoprolol succinate, entresto  You were prescribed the following new medications: metoprolol succinate 25mg qd, entresto 24mg-26mg q12h    You will need to follow up with your primary care physician and cardiologist.      Discharging Provider:  Celio Smith MD  Contact Info: [phone]  Please call with any questions or concerns.   Hospital Course  HPI:  Mr Sherman is a 89-year-old male with hypertension, aortic insufficiency, chronic ITP, chronic dermatitis on chronic Prednisone 5 mg/day, BPH, mild CKD, skin cancer, presented to [facility] from home with c/o dizziness over several months worsening over the past week. In the ED pt was on tele monitor and noted to be in and out of afib.  Labs significant for BUN/crea 34/1.7 (baseline creat is 1.29). 1st EKG showed sinus rhythm, 98/min with PAC's, LBBB.  2nd EKG showed atrial fib with /min, LBBB. A CXR showed clear lungs.  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 There is heavily calcified atheromatous disease at both V4 vertebral segments. Moderate generalized cerebral volume loss, with distention of the sulci concomitant ex-vacuo ventricular dilatation. Mild-to-moderate nonspecific low attenuation in the periventricular and subcortical white matter, likely due to small vessel disease. There was no evidence of acute ICH, midline shift, or acute territorial infarction.  Neurology was consulted and recommended an MRI which revealed dolichoectasia most evident at the left vertebral artery and basilar artery, as well as age-related changes.  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  Patient's amlodipine was discontinued.   Cardio recommended further evaluation for ischemic cardiomyopathy; coronary angiogram was discussed, however patient does not wish to proceed with an angiogram, and he expressed appropriate understanding of the risks of unrevascularized CAD and fatal arrhythmias. He agreed to pursue angio as an outpatient.  GDMT was started as renal function tolerated; He was started on metoprolol succinate 25mg qd. Patient's Cr improved to 1.24 and he was started on entresto 24/26mg bid.  He was seen by PT who recommended home PT.  He underwent a repeat echo with IV ultrasonic enhancing agent.  Patient should follow up with his outpatient PCP and cardiologist; he should have labs drawn within 1 week to monitor kidney function while on entresto. He is otherwise medically optimized for discharge home with home PT.    You were admitted for dizziness, ASHER  You were diagnosed with systolic heart failure  You were treated with metoprolol succinate, entresto  You were prescribed the following new medications: metoprolol succinate 25mg qd, entresto 24mg-26mg q12h, spironolactone 12.5mg qd, farxiga 10mg qd    You will need to follow up with your primary care physician and cardiologist.      Discharging Provider:  Celio Smith MD  Contact Info: [phone]  Please call with any questions or concerns.   Hospital Course  HPI:  Mr Sherman is a 89-year-old male with hypertension, aortic insufficiency, chronic ITP, chronic dermatitis on chronic Prednisone 5 mg/day, BPH, mild CKD, skin cancer, presented to [facility] from home with c/o dizziness over several months worsening over the past week. In the ED pt was on tele monitor and noted to be in and out of afib.  Labs significant for BUN/crea 34/1.7 (baseline creat is 1.29). 1st EKG showed sinus rhythm, 98/min with PAC's, LBBB.  2nd EKG showed atrial fib with /min, LBBB. A CXR showed clear lungs.  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 There is heavily calcified atheromatous disease at both V4 vertebral segments. Moderate generalized cerebral volume loss, with distention of the sulci concomitant ex-vacuo ventricular dilatation. Mild-to-moderate nonspecific low attenuation in the periventricular and subcortical white matter, likely due to small vessel disease. There was no evidence of acute ICH, midline shift, or acute territorial infarction.  Neurology was consulted and recommended an MRI which revealed dolichoectasia most evident at the left vertebral artery and basilar artery, as well as age-related changes.  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  Patient's amlodipine was discontinued.   Cardio recommended further evaluation for ischemic cardiomyopathy; coronary angiogram was discussed, however patient does not wish to proceed with an angiogram, and he expressed appropriate understanding of the risks of unrevascularized CAD and fatal arrhythmias. He agreed to pursue angio as an outpatient.  GDMT was started as renal function tolerated; He was started on metoprolol succinate 25mg qd. Patient's Cr improved to 1.24 and he was started on entresto 24/26mg bid.  He was seen by PT who recommended home PT.  He underwent a repeat echo with IV ultrasonic enhancing agent. LVEF again 30-35%. No thrombus detected.  Patient should follow up with his outpatient PCP and cardiologist; he should have labs drawn within 1 week to monitor kidney function while on entresto. He is otherwise medically optimized for discharge home with home PT.    You were admitted for dizziness, ASHER  You were diagnosed with systolic heart failure  You were treated with metoprolol succinate, entresto  You were prescribed the following new medications: metoprolol succinate 25mg qd, entresto 24mg-26mg q12h, spironolactone 12.5mg qd, farxiga 10mg qd    You will need to follow up with your primary care physician and cardiologist.      Discharging Provider:  Celio Smith MD  Contact Info: [phone]  Please call with any questions or concerns.   Hospital Course  HPI:  Mr Sherman is a 89-year-old male with hypertension, aortic insufficiency, chronic ITP, chronic dermatitis on chronic Prednisone 5 mg/day, BPH, mild CKD, skin cancer, presented to [facility] from home with c/o dizziness over several months worsening over the past week. In the ED pt was on tele monitor and noted to be in and out of afib.  Labs significant for BUN/crea 34/1.7 (baseline creat is 1.29). 1st EKG showed sinus rhythm, 98/min with PAC's, LBBB.  2nd EKG showed atrial fib with /min, LBBB. A CXR showed clear lungs.  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 There is heavily calcified atheromatous disease at both V4 vertebral segments. Moderate generalized cerebral volume loss, with distention of the sulci concomitant ex-vacuo ventricular dilatation. Mild-to-moderate nonspecific low attenuation in the periventricular and subcortical white matter, likely due to small vessel disease. There was no evidence of acute ICH, midline shift, or acute territorial infarction.  Neurology was consulted and recommended an MRI which revealed dolichoectasia most evident at the left vertebral artery and basilar artery, as well as age-related changes.  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  Patient's amlodipine was discontinued.   Cardio recommended further evaluation for ischemic cardiomyopathy; coronary angiogram was discussed, however patient does not wish to proceed with an angiogram, and he expressed appropriate understanding of the risks of unrevascularized CAD and fatal arrhythmias. He agreed to pursue angio as an outpatient.  GDMT was started as renal function tolerated; He was started on metoprolol succinate 25mg qd. Patient's Cr improved to 1.24 and he was started on entresto 24/26mg bid.  He was seen by PT who recommended home PT.  He underwent a repeat echo with IV ultrasonic enhancing agent. LVEF again 30-35%. No thrombus detected.  Patient should follow up with his outpatient PCP and cardiologist ; he should have labs drawn within 1 week to monitor kidney function while on entresto. He is otherwise medically optimized for discharge home with home PT.    You were admitted for dizziness, ASHER  You were diagnosed with systolic heart failure  You were treated with metoprolol succinate, entresto  You were prescribed the following new medications: metoprolol succinate 25mg qd, entresto 24mg-26mg q12h, spironolactone 12.5mg qd, farxiga 10mg qd    You will need to follow up with your primary care physician and cardiologist.    PMD made aware of course and dc follow up plan.     Discharging Provider:  Celio Smith MD  Contact Info: [phone]  Please call with any questions or concerns.   Hospital Course  HPI:  Mr Sherman is a 89-year-old male with hypertension, aortic insufficiency, chronic ITP, chronic dermatitis on chronic Prednisone 5 mg/day, BPH, mild CKD, skin cancer, presented to [facility] from home with c/o dizziness over several months worsening over the past week. In the ED pt was on tele monitor and noted to be in and out of afib.  Labs significant for BUN/crea 34/1.7 (baseline creat is 1.29). 1st EKG showed sinus rhythm, 98/min with PAC's, LBBB.  2nd EKG showed atrial fib with /min, LBBB. A CXR showed clear lungs.  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 There is heavily calcified atheromatous disease at both V4 vertebral segments. Moderate generalized cerebral volume loss, with distention of the sulci concomitant ex-vacuo ventricular dilatation. Mild-to-moderate nonspecific low attenuation in the periventricular and subcortical white matter, likely due to small vessel disease. There was no evidence of acute ICH, midline shift, or acute territorial infarction.  Neurology was consulted and recommended an MRI which revealed dolichoectasia most evident at the left vertebral artery and basilar artery, as well as age-related changes.  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  Patient's amlodipine was discontinued.   Cardio recommended further evaluation for ischemic cardiomyopathy; coronary angiogram was discussed, however patient does not wish to proceed with an angiogram, and he expressed appropriate understanding of the risks of unrevascularized CAD and fatal arrhythmias. He agreed to pursue angio as an outpatient.  GDMT was started as renal function tolerated; He was started on metoprolol succinate 25mg qd. Patient's Cr improved to 1.24 and he was started on entresto 24/26mg bid.  Patient did not require diuresis as he did not show signs of fluid overload.  He was seen by PT who recommended home PT.  He underwent a repeat echo with IV ultrasonic enhancing agent. LVEF again 30-35%. No thrombus detected.  Patient should follow up with his outpatient PCP and cardiologist ; he should have labs drawn within 1 week to monitor kidney function while on entresto. He is otherwise medically optimized for discharge home with home PT.    You were admitted for dizziness, ASHER  You were diagnosed with systolic heart failure  You were treated with metoprolol succinate, entresto  You were prescribed the following new medications: metoprolol succinate 25mg qd, entresto 24mg-26mg q12h, spironolactone 12.5mg qd, farxiga 10mg qd    You will need to follow up with your primary care physician and cardiologist.    PMD made aware of course and dc follow up plan.     Discharging Provider:  Celio Smith MD  Contact Info: [phone]  Please call with any questions or concerns.

## 2024-05-14 NOTE — DIETITIAN INITIAL EVALUATION ADULT - PERTINENT MEDS FT
MEDICATIONS  (STANDING):  aspirin enteric coated 81 milliGRAM(s) Oral daily  calcitriol   Capsule 0.25 MICROGram(s) Oral daily  calcium carbonate 1250 mG  + Vitamin D (OsCal 500 + D) 1 Tablet(s) Oral daily  enoxaparin Injectable 30 milliGRAM(s) SubCutaneous every 24 hours  metoprolol succinate ER 25 milliGRAM(s) Oral daily  predniSONE   Tablet 5 milliGRAM(s) Oral daily  tamsulosin 0.8 milliGRAM(s) Oral at bedtime    MEDICATIONS  (PRN):  acetaminophen     Tablet .. 650 milliGRAM(s) Oral every 6 hours PRN Temp greater or equal to 38C (100.4F), Mild Pain (1 - 3)  meclizine 12.5 milliGRAM(s) Oral four times a day PRN Dizziness

## 2024-05-14 NOTE — DIETITIAN INITIAL EVALUATION ADULT - PERSON TAUGHT/METHOD
Heart Failure nutrition therapy/verbal instruction/written material/teach back - (Patient repeats in own words)/patient instructed/spouse instructed/daughter instructed

## 2024-05-14 NOTE — DISCHARGE NOTE PROVIDER - NSDCCPCAREPLAN_GEN_ALL_CORE_FT
PRINCIPAL DISCHARGE DIAGNOSIS  Diagnosis: Dizziness  Assessment and Plan of Treatment: Following your recent hospitalization, here are important instructions for your continued care:  Medication Management:  Take medications as prescribed, including Metoprolol Succinate 25mg daily and Entresto 24/26mg twice daily.  Discontinue Amlodipine as advised.  Follow-up Appointments:  Schedule appointments with your primary care physician (PCP) and cardiologist.  Labs should be drawn within 1 week to monitor kidney function while on Entresto.  Activity:  Engage in home physical therapy (PT) as recommended by the PT team.  Follow any exercise or mobility recommendations provided by your healthcare providers.  Diet and Lifestyle:  Maintain a balanced diet and stay hydrated.  Follow any dietary restrictions advised by your healthcare team.  Symptom Monitoring:  Monitor for symptoms such as dizziness, shortness of breath, or chest pain.  Report any new or worsening symptoms to your healthcare provider promptly.  Home Environment:  Ensure your home is safe and conducive to your recovery.  Arrange for any necessary assistance or support at home.  Emergency Contact:  In case of any emergency or significant deterioration in your condition, seek medical attention immediately.  Caregiver Education:  Educate caregivers about your condition, medications, and any specific care needs.  Emotional Support:  Seek support from family, friends, or support groups if needed.  We are here to support you during your recovery journey. If you have any questions or concerns, don't hesitate to reach out to us or your healthcare provider.      SECONDARY DISCHARGE DIAGNOSES  Diagnosis: ASHER (acute kidney injury)  Assessment and Plan of Treatment:     Diagnosis: Atrial fibrillation  Assessment and Plan of Treatment:      PRINCIPAL DISCHARGE DIAGNOSIS  Diagnosis: Dizziness  Assessment and Plan of Treatment: Medication Management: Take medications as prescribed, including Metoprolol Succinate 25mg daily and Entresto 24/26mg twice daily. Attend follow-up appointments with your cardiologist and primary care physician. Monitor kidney function closely while on Entresto, and have labs drawn within 1 week.  Activity and Therapy: Participate in home physical therapy as recommended by your healthcare team. Follow the exercises prescribed by your physical therapist to improve mobility and strength.  Diet and Lifestyle: Maintain a healthy diet and avoid excessive salt intake. Stay hydrated and engage in light physical activity as tolerated. Follow any dietary recommendations provided by your healthcare provider.  Symptom Monitoring: Keep track of any symptoms such as dizziness or shortness of breath. Report any new or worsening symptoms to your healthcare provider promptly.  Follow-up Appointments: Schedule and attend follow-up appointments with your primary care physician and cardiologist. These visits are crucial for monitoring your heart health and adjusting your treatment plan as needed.  Risk Understanding: Continue to understand the risks associated with your condition, including unrevascularized coronary artery disease and arrhythmias. Make informed decisions about further evaluation and treatment options in consultation with your healthcare team.  Emergency Contact: In case of any emergency or significant symptoms, seek medical attention promptly or call emergency services.  We are here to support you in your recovery journey. If you have any questions or concerns, do not hesitate to reach out to us or your healthcare provider.      SECONDARY DISCHARGE DIAGNOSES  Diagnosis: ASHER (acute kidney injury)  Assessment and Plan of Treatment:     Diagnosis: Atrial fibrillation  Assessment and Plan of Treatment:

## 2024-05-14 NOTE — PROGRESS NOTE ADULT - ASSESSMENT
Assessment:  Smooth Sherman is a 89 year old man with past medical history of Mild cardiomyopathy, Hypertension, Aortic insufficiency, Mild CKD, Chronic ITP who presented with dizziness and generalized weakness, with recent fall, found to have systolic heart failure.     The patient reports history of leg edema and dyspnea on exertion for several months now. ECG consistent with sinus rhythm, PACs and LBBB, the LBBB is old. Troponin negative. Echo consistent with LVEF 30-35%, moderate aortic regurgitation, stasis of flow in IVC, no obvious thrombus visualized. Telemetry consistent with sinus rhythm with PACs. CXR consistent with clear lungs. CT head consistent with cerebral changes without acute intracranial hemorrhage.    Recommendations:  [] Systolic heart failure: Likely subacute presentation. Near-euvolemic. In regards to etiology, recommend evaluation for ischemic cardiomyopathy; discussed possible coronary angiogram with patient and family, he is at increased risk due to age of 89 and CKD, will need Nephrology clearance for coronary angiogram. In regards to guideline directed medical therapy, continue Metoprolol succinate 25 mg daily. If renal function improves, would consider addition of ARNI and MRA. Will need repeat echo with IV ultrasonic enhancing agent to ensure no thrombus. Continue to monitor on telemetry, no signs of atrial fibrillation at this time. Moderate AR can be monitored at this time, may need structural heart team evaluation.   [] Dizziness: Follow up Neurology, plan for MRI brain.     We will continue to follow along.    Madie Clements MD  Cardiology

## 2024-05-14 NOTE — DIETITIAN INITIAL EVALUATION ADULT - OTHER INFO
89M h/o HTN, aortic insufficiency, chronic ITP, chronic dermatitits on prednisone, BPH, mild CKD, skin cancer presenting for dizziness found to have paroxysmal afib, admitted for intractable dizziness and millie.   Pt tolerating diet with report of good appetite. Reports UBW 160lbs. CBW 156lbs. Family at bedside reports mild weight loss over time. No skin breakdown or edema noted. Pt denies N/V/D, constipation. Last BM 5/13. Heart Healthy diet education reviewed with pt and his family at bedside- encouraged sodium reduction, avoidance of processed foods/meats, lower saturated fat & increased unsaturated fat & fiber, reviewed fluid restriction. Written materials provided for reference. Pt receptive to Ensure HP qd to optimize nutritional intake.

## 2024-05-14 NOTE — DISCHARGE NOTE PROVIDER - NSDCHHNEEDSERVICE_GEN_ALL_CORE
Medication teaching and assessment/Rehabilitation services/Teaching and training Medication teaching and assessment/Observation and assessment/Rehabilitation services/Teaching and training

## 2024-05-14 NOTE — PROGRESS NOTE ADULT - ASSESSMENT
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, heavily calcified atheromatous disease both V4 vertebral segments, gen cerebral volume loss, small vessel disease, no acute ICH/midline shift/infarct  - MR brain ordered  - ASA 81  - neuro consult, cardio consult  - PT recommends home PT  - TTE 5/13 LVEF 30-35% mod aortic regurgitation  - fall precautions  - f/u lipid panel    New systolic HF    Atrial fibrillation, paroxysmal  LBBB  - CHADsVasc score of 3  - f/u TTE    Acute kidney injury, dehydration  CKD, mild  - BUN 34, Cr 1.7 on admission  - Cr baseline 1.29  - IV LR@75  - avoid nephrotoxins  - trend renal fxn    Hypertension  Aortic insufficiency, chronic  - held home amlodipine  - started metoprolol tartrate 12.5mg q12h  - orthostatics ordered    Chronic dermatitis  - patient reports prednisone prescribed by dermatologist for rash, will continue home prednisone 5mg qd    BPH  - c/w flomax    DVT ppx  Lovenox    Discussed with Dr. Mac. 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, heavily calcified atheromatous disease both V4 vertebral segments, gen cerebral volume loss, small vessel disease, no acute ICH/midline shift/infarct  - MR brain ordered  - ASA 81  - neuro consult, cardio consult  - PT recommends home PT  - TTE 5/13 LVEF 30-35% mod aortic regurgitation  - fall precautions  - f/u lipid panel    Systolic HF  - TTE 5/13 LVEF 30-35% mod aortic regurgitation  - cardio recs coronary angio  - pending nephro clearance  - GDMT - c/w metoprolol succinate 25mg qd; GDMT meds held for kidney fxn    Atrial fibrillation, paroxysmal  LBBB  - CHADsVasc score of 3  - TTE 5/13 LVEF 30-35% mod aortic regurgitation  - c/w ASA 81    Acute kidney injury, dehydration  CKD, mild  - BUN 34, Cr 1.7 on admission  - Cr baseline 1.29  - s/p IV LR@75 with some improvement  - avoid nephrotoxins  - trend renal fxn    Hypertension  Aortic insufficiency, chronic  - held home amlodipine  - s/p metoprolol tartrate 12.5mg q12h  - DC with metoprolol succinate 25mg qd  - orthostatic negative    Chronic dermatitis  - patient reports prednisone prescribed by dermatologist for rash, will continue home prednisone 5mg qd    BPH  - c/w flomax    DVT ppx  Lovenox    Discussed with Dr. Mac.

## 2024-05-14 NOTE — DISCHARGE NOTE PROVIDER - CARE PROVIDERS DIRECT ADDRESSES
[email or web address]

## 2024-05-14 NOTE — DIETITIAN INITIAL EVALUATION ADULT - ORAL INTAKE PTA/DIET HISTORY
Pt endorses good appetite- states that he has lost most of his taste, but tries to eat a healthy, balanced diet consisting of mostly whole foods (avoids highly processed/packaged foods). Bfast: cheerio's, fresh fruit, yogurt or oatmeal with flax seeds, fruit, yogurt or eggs with corn beef hash. States that he eats mostly poultry & avoids red meat. Drinks Ensure from time to time. Takes Vitamin C and Vitamin D. NKFA. No chewing/swallowing issues.

## 2024-05-14 NOTE — PROGRESS NOTE ADULT - SUBJECTIVE AND OBJECTIVE BOX
s: no acute events    Vital Signs Last 24 Hrs  T(C): 36.6 (14 May 2024 04:58), Max: 36.7 (13 May 2024 20:00)  T(F): 97.8 (14 May 2024 04:58), Max: 98 (13 May 2024 20:00)  HR: 98 (14 May 2024 04:58) (80 - 98)  BP: 153/91 (14 May 2024 04:58) (120/66 - 153/91)  BP(mean): --  RR: 18 (14 May 2024 04:58) (18 - 23)  SpO2: 96% (14 May 2024 04:58) (96% - 97%)    Parameters below as of 14 May 2024 04:58  Patient On (Oxygen Delivery Method): room air    Sitting in bed talking and following commands.  Face symmetric  Moves all 4 extremities against gravity    89-year-old gentleman who is consulted for dizziness.  Patient is awaiting MRI of the brain due to his vascular risk factors.  Patient will continue aspirin 81 mg for now.  With the newly diagnosed A-fib patient should start anticoagulation for stroke prevention.  Once it is started can stop the aspirin unless there is a cardiac indication to continue both aspirin and anticoagulation.

## 2024-05-14 NOTE — DISCHARGE NOTE PROVIDER - PROVIDER TOKENS
PROVIDER:[TOKEN:[[id number]],FOLLOWUP:[1 week]],PROVIDER:[TOKEN:[[id number]],FOLLOWUP:[1 week]]

## 2024-05-14 NOTE — DISCHARGE NOTE PROVIDER - DETAILS OF MALNUTRITION DIAGNOSIS/DIAGNOSES
This patient has been assessed with a concern for Malnutrition and was treated during this hospitalization for the following Nutrition diagnosis/diagnoses:     -  05/14/2024: Moderate protein-calorie malnutrition

## 2024-05-14 NOTE — DISCHARGE NOTE PROVIDER - NSDCMRMEDTOKEN_GEN_ALL_CORE_FT
alfuzosin 10 mg oral tablet, extended release: 1 tab(s) orally once a day (at bedtime)  amLODIPine 5 mg oral tablet: 1 tab(s) orally once a day  calcitriol 0.25 mcg oral capsule: 1 cap(s) orally once a day  calcium (as carbonate)-vitamin D 600 mg-400 intl units (10 mcg) oral tablet: 1 tab(s) orally once a day  predniSONE 5 mg oral tablet: 1 tab(s) orally once a day   alfuzosin 10 mg oral tablet, extended release: 1 tab(s) orally once a day (at bedtime)  aspirin 81 mg oral delayed release tablet: 1 tab(s) orally once a day  calcitriol 0.25 mcg oral capsule: 1 cap(s) orally once a day  calcium (as carbonate)-vitamin D 600 mg-400 intl units (10 mcg) oral tablet: 1 tab(s) orally once a day  metoprolol succinate 25 mg oral tablet, extended release: 1 tab(s) orally once a day  predniSONE 5 mg oral tablet: 1 tab(s) orally once a day  sacubitril-valsartan 24 mg-26 mg oral tablet: 1 tab(s) orally 2 times a day   alfuzosin 10 mg oral tablet, extended release: 1 tab(s) orally once a day (at bedtime)  aspirin 81 mg oral delayed release tablet: 1 tab(s) orally once a day  calcitriol 0.25 mcg oral capsule: 1 cap(s) orally once a day  calcium (as carbonate)-vitamin D 600 mg-400 intl units (10 mcg) oral tablet: 1 tab(s) orally once a day  dapagliflozin 10 mg oral tablet: 1 tab(s) orally once a day  metoprolol succinate 25 mg oral tablet, extended release: 1 tab(s) orally once a day  predniSONE 5 mg oral tablet: 1 tab(s) orally once a day  sacubitril-valsartan 24 mg-26 mg oral tablet: 1 tab(s) orally 2 times a day  spironolactone 25 mg oral tablet: 0.5 tab(s) orally once a day   alfuzosin 10 mg oral tablet, extended release: 1 tab(s) orally once a day (at bedtime)  aspirin 81 mg oral delayed release tablet: 1 tab(s) orally once a day  calcitriol 0.25 mcg oral capsule: 1 cap(s) orally once a day  calcium (as carbonate)-vitamin D 600 mg-400 intl units (10 mcg) oral tablet: 1 tab(s) orally once a day  Farxiga 10 mg oral tablet: 1 tab(s) orally once a day  metoprolol succinate 25 mg oral tablet, extended release: 1 tab(s) orally once a day  predniSONE 5 mg oral tablet: 1 tab(s) orally once a day  sacubitril-valsartan 24 mg-26 mg oral tablet: 1 tab(s) orally 2 times a day  spironolactone 25 mg oral tablet: 0.5 tab(s) orally once a day

## 2024-05-14 NOTE — DISCHARGE NOTE PROVIDER - CARE PROVIDER_API CALL
Bridges, Claude Mustafa  Internal Medicine  [address], [address]  [place] [address]  Phone: [phone]  Fax: [phone]  Follow Up Time: 1 week    Madie Clements  Cardiovascular Disease  [address], [address]  [place] [address]  Phone: [phone]  Fax: [phone]  Follow Up Time: 1 week

## 2024-05-14 NOTE — DIETITIAN NUTRITION RISK NOTIFICATION - TREATMENT: THE FOLLOWING DIET HAS BEEN RECOMMENDED
Diet, DASH/TLC:   Sodium & Cholesterol Restricted  1500mL Fluid Restriction ([id number])     Special Instructions for Nursin milliLiter(s) to 2000 milliLiter(s) fluid restriction (24 @ 15:26) [Active]

## 2024-05-14 NOTE — PROGRESS NOTE ADULT - TIME BILLING
care coordination, plan of care discussed with patient face to face, 2surg IDR team
care coordination, plan of care discussed with patient face to face, 2surg IDR team

## 2024-05-14 NOTE — CONSULT NOTE ADULT - SUBJECTIVE AND OBJECTIVE BOX
NEPHROLOGY CONSULTATION    CHIEF COMPLAINT: dizzy    HPI:  Pt is a 89 yo m with hypertension, aortic insufficiency, chronic ITP, chronic dermatitis on Prednisone 5 mg/day, BPH, mild CKD, skin cancer, presents to the emergency department from home 5/12 complaining of worsening dizziness and weakness. He had seen a Neurologist about 8 months ago, had MRI, was told it was unremarkable. Pt fell last week, lost his balance. There was no head injury or loss of consciousness. No chest pain, dyspnea, nausea, vomiting, abdominal pain. Noticed palpitations day of adm. Noted to be in and out of afib.     ROS:  as above    Allergies:  No Known Allergies    PAST MEDICAL & SURGICAL HISTORY:  BPH (benign prostatic hyperplasia)  Ruslan's disease  HTN (hypertension)  Thrombocytopenia  Osteoarthritis  Bladder polyps  H/O hernia repair  Status post Mohs surgery  H/O transurethral resection of bladder tumor (TURBT)  History of transurethral resection of bladder tumor (TURBT)    SOCIAL HISTORY:  negative    FAMILY HISTORY:  NC    MEDICATIONS  (STANDING):  aspirin enteric coated 81 milliGRAM(s) Oral daily  calcitriol   Capsule 0.25 MICROGram(s) Oral daily  calcium carbonate 1250 mG  + Vitamin D (OsCal 500 + D) 1 Tablet(s) Oral daily  enoxaparin Injectable 30 milliGRAM(s) SubCutaneous every 24 hours  metoprolol succinate ER 25 milliGRAM(s) Oral daily  predniSONE   Tablet 5 milliGRAM(s) Oral daily  tamsulosin 0.8 milliGRAM(s) Oral at bedtime    Home Medications:  alfuzosin 10 mg oral tablet, extended release: 1 tab(s) orally once a day (at bedtime) (12 May 2024 22:06)  amLODIPine 5 mg oral tablet: 1 tab(s) orally once a day (12 May 2024 20:45)  calcitriol 0.25 mcg oral capsule: 1 cap(s) orally once a day (12 May 2024 21:09)  calcium (as carbonate)-vitamin D 600 mg-400 intl units (10 mcg) oral tablet: 1 tab(s) orally once a day (12 May 2024 22:09)  predniSONE 5 mg oral tablet: 1 tab(s) orally once a day (12 May 2024 20:42)    Vital Signs Last 24 Hrs  T(C): 36.6 (05-14-24 @ 04:58), Max: 36.7 (05-13-24 @ 20:00)  T(F): 97.8 (05-14-24 @ 04:58), Max: 98 (05-13-24 @ 20:00)  HR: 98 (05-14-24 @ 04:58) (80 - 98)  BP: 153/91 (05-14-24 @ 04:58) (138/76 - 153/91)  RR: 18 (05-14-24 @ 04:58) (18 - 23)  SpO2: 86% (05-14-24 @ 10:08) (86% - 97%)    LABS:                        12.9   10.73 )-----------( 157      ( 14 May 2024 08:16 )             37.4     05-14    140  |  107  |  23  ----------------------------<  101<H>  4.2   |  25  |  1.34<H>    Ca    9.2      14 May 2024 08:16  Mg     1.6     05-14    TPro  6.9  /  Alb  3.2<L>  /  TBili  0.9  /  DBili  x   /  AST  18  /  ALT  15  /  AlkPhos  70  05-12    LIVER FUNCTIONS - ( 12 May 2024 16:15 )  Alb: 3.2 g/dL / Pro: 6.9 g/dL / ALK PHOS: 70 U/L / ALT: 15 U/L / AST: 18 U/L / GGT: x           A/P:    full consult to follow    [phone]       NEPHROLOGY CONSULTATION    CHIEF COMPLAINT: dizzy    HPI:  Pt is a 89 yo m with hx hypertension, aortic insufficiency, chronic ITP, chronic dermatitis on Prednisone 5 mg/day, BPH, mild CKD, skin cancer, presents to the emergency department from home 5/12 complaining of worsening dizziness and weakness. He had seen a Neurologist about 8 months ago, had MRI, was told it was unremarkable. Pt fell last week, lost his balance. There was no head injury or loss of consciousness. No chest pain, dyspnea, nausea, vomiting, abdominal pain. Noticed palpitations day of adm. Noted to be in and out of afib. Today comfortable on RA, w/o complaints.     ROS:  as above    Allergies:  No Known Allergies    PAST MEDICAL & SURGICAL HISTORY:  BPH (benign prostatic hyperplasia)  Ruslan's disease  HTN (hypertension)  Thrombocytopenia  Osteoarthritis  Bladder polyps  H/O hernia repair  Status post Mohs surgery  H/O transurethral resection of bladder tumor (TURBT)  History of transurethral resection of bladder tumor (TURBT)    SOCIAL HISTORY:  negative    FAMILY HISTORY:  NC    MEDICATIONS  (STANDING):  aspirin enteric coated 81 milliGRAM(s) Oral daily  calcitriol   Capsule 0.25 MICROGram(s) Oral daily  calcium carbonate 1250 mG  + Vitamin D (OsCal 500 + D) 1 Tablet(s) Oral daily  enoxaparin Injectable 30 milliGRAM(s) SubCutaneous every 24 hours  metoprolol succinate ER 25 milliGRAM(s) Oral daily  predniSONE   Tablet 5 milliGRAM(s) Oral daily  tamsulosin 0.8 milliGRAM(s) Oral at bedtime    Home Medications:  alfuzosin 10 mg oral tablet, extended release: 1 tab(s) orally once a day (at bedtime) (12 May 2024 22:06)  amLODIPine 5 mg oral tablet: 1 tab(s) orally once a day (12 May 2024 20:45)  calcitriol 0.25 mcg oral capsule: 1 cap(s) orally once a day (12 May 2024 21:09)  calcium (as carbonate)-vitamin D 600 mg-400 intl units (10 mcg) oral tablet: 1 tab(s) orally once a day (12 May 2024 22:09)  predniSONE 5 mg oral tablet: 1 tab(s) orally once a day (12 May 2024 20:42)    Vital Signs Last 24 Hrs  T(C): 36.6 (05-14-24 @ 04:58), Max: 36.7 (05-13-24 @ 20:00)  T(F): 97.8 (05-14-24 @ 04:58), Max: 98 (05-13-24 @ 20:00)  HR: 98 (05-14-24 @ 04:58) (80 - 98)  BP: 153/91 (05-14-24 @ 04:58) (138/76 - 153/91)  RR: 18 (05-14-24 @ 04:58) (18 - 23)  SpO2: 86% (05-14-24 @ 10:08) (86% - 97%)    s1s2  b/l air entry  soft, ND  tr edema    LABS:                        12.9   10.73 )-----------( 157      ( 14 May 2024 08:16 )             37.4     05-14    140  |  107  |  23  ----------------------------<  101<H>  4.2   |  25  |  1.34<H>    Ca    9.2      14 May 2024 08:16  Mg     1.6     05-14    TPro  6.9  /  Alb  3.2<L>  /  TBili  0.9  /  DBili  x   /  AST  18  /  ALT  15  /  AlkPhos  70  05-12    LIVER FUNCTIONS - ( 12 May 2024 16:15 )  Alb: 3.2 g/dL / Pro: 6.9 g/dL / ALK PHOS: 70 U/L / ALT: 15 U/L / AST: 18 U/L / GGT: x           A/P:    CKD 3 stable and near baseline, UA negative   CM, EF 30 - 35%, mod AR, Afib  Pt is at small risk of ASHER due to IV dye study  Cardiac benefits of cath may justify potential renal risk  No renal contraindications to cardiac cath if indicated  Avoid nephrotoxins  F/u BMP  D/w family     [phone]

## 2024-05-14 NOTE — PROGRESS NOTE ADULT - SUBJECTIVE AND OBJECTIVE BOX
[facility]  [id number]      Chief Complaint: New systolic heart failure    Interval History: The patient denies chest pain or shortness of breath.     Tele: sinus rhythm 80s BPM, NSVT      Current meds:   acetaminophen     Tablet .. 650 milliGRAM(s) Oral every 6 hours PRN  aspirin enteric coated 81 milliGRAM(s) Oral daily  calcitriol   Capsule 0.25 MICROGram(s) Oral daily  calcium carbonate 1250 mG  + Vitamin D (OsCal 500 + D) 1 Tablet(s) Oral daily  enoxaparin Injectable 30 milliGRAM(s) SubCutaneous every 24 hours  meclizine 12.5 milliGRAM(s) Oral four times a day PRN  metoprolol succinate ER 25 milliGRAM(s) Oral daily  predniSONE   Tablet 5 milliGRAM(s) Oral daily  tamsulosin 0.8 milliGRAM(s) Oral at bedtime      Objective:    Vital Signs:   T(C): 36.6 (05-14-24 @ 04:58), Max: 36.7 (05-13-24 @ 20:00)  HR: 98 (05-14-24 @ 04:58) (80 - 98)  BP: 153/91 (05-14-24 @ 04:58) (120/66 - 153/91)  RR: 18 (05-14-24 @ 04:58) (18 - 23)  SpO2: 96% (05-14-24 @ 04:58) (96% - 97%)  Wt(kg): --    Physical Exam:   General: no distress  HEENT: sclera anicteric  Neck: supple  CVS: JVP ~ 7 cm H20, RRR, s1, s2  Chest: unlabored respirations, clear to auscultation b/l  Abdomen: non-distended  Extremities: no lower extremity edema b/l  Neuro: awake, alert & oriented  Psych: normal affect      Labs:   12 May 2024 16:15    136    |  104    |  34     ----------------------------<  121    4.9     |  23     |  1.72     Ca    10.0       12 May 2024 16:15  Mg     1.7       13 May 2024 16:40    TPro  6.9    /  Alb  3.2    /  TBili  0.9    /  DBili  x      /  AST  18     /  ALT  15     /  AlkPhos  70     12 May 2024 16:15                          14.2   5.50  )-----------( 169      ( 12 May 2024 16:15 )             40.8               Nuclear stress test (2019):  Normal     Outpatient TTE (3/2023):  Mildly impaired LV systolic function  Moderate AR, Mild-moderate TR      TTE (5/13/24):   1. Moderately decreased global left ventricular systolic function.   2. Left ventricular ejection fraction, by visual estimation, is 30 to 35%.   3. Moderate global left ventricle hypokinesis. Consider use of IV   ultrasonic enhancing agent to better evaluate regional wall motion and apex, if clinically indicated.   4. Mildly increased left ventricular internal cavity size.   5. Mildly increased LV wall thickness.   6. Mildly enlarged right ventricle with normal right ventricle systolic   function.   7. Right atrial enlargement.   8. Degenerative mitral valve.   9. Moderate thickening of the anterior and posterior mitral valve   leaflets.  10. Mild mitral valve regurgitation.  11. Mild tricuspid regurgitation.  12. Aortic valve leaflet calcification. No aortic valve stenosis.  13. Moderate aortic regurgitation.  14. Top normal sized aorta at the Sinuses of Valsalva (3.8 cm). Top   normal proximal ascending aorta (3.9 cm).  15. Stasis of flow noted in the IVC with increased risk for thrombus   formation. Recommend clinical correlation.  16. There is no evidence of pericardial effusion.    ECG (5/12/24): sinus rhythm, PACs, LBBB (LBBB present on outpatient ECG)

## 2024-05-14 NOTE — DIETITIAN INITIAL EVALUATION ADULT - NSICDXPASTSURGICALHX_GEN_ALL_CORE_FT
PAST SURGICAL HISTORY:  H/O hernia repair     H/O transurethral resection of bladder tumor (TURBT)     History of transurethral resection of bladder tumor (TURBT)     Status post Mohs surgery

## 2024-05-14 NOTE — PROGRESS NOTE ADULT - SUBJECTIVE AND OBJECTIVE BOX
Patient is a 89y old  Male who presents with a chief complaint of Dizziness and giddiness     (14 May 2024 13:21)      INTERVAL HPI/OVERNIGHT EVENTS: Patient seen and examined at bedside. No overnight events.    MEDICATIONS  (STANDING):  aspirin enteric coated 81 milliGRAM(s) Oral daily  calcitriol   Capsule 0.25 MICROGram(s) Oral daily  calcium carbonate 1250 mG  + Vitamin D (OsCal 500 + D) 1 Tablet(s) Oral daily  enoxaparin Injectable 30 milliGRAM(s) SubCutaneous every 24 hours  metoprolol succinate ER 25 milliGRAM(s) Oral daily  predniSONE   Tablet 5 milliGRAM(s) Oral daily  tamsulosin 0.8 milliGRAM(s) Oral at bedtime    MEDICATIONS  (PRN):  acetaminophen     Tablet .. 650 milliGRAM(s) Oral every 6 hours PRN Temp greater or equal to 38C (100.4F), Mild Pain (1 - 3)  meclizine 12.5 milliGRAM(s) Oral four times a day PRN Dizziness      Allergies    No Known Allergies    Intolerances      Vital Signs Last 24 Hrs  T(C): 36.2 (14 May 2024 12:00), Max: 36.7 (13 May 2024 20:00)  T(F): 97.2 (14 May 2024 12:00), Max: 98 (13 May 2024 20:00)  HR: 88 (14 May 2024 12:00) (80 - 98)  BP: 136/79 (14 May 2024 12:00) (136/79 - 153/91)  BP(mean): --  RR: 19 (14 May 2024 12:00) (18 - 23)  SpO2: 97% (14 May 2024 12:00) (96% - 97%)    Parameters below as of 14 May 2024 12:00  Patient On (Oxygen Delivery Method): room air      I&O's Summary    BMI (kg/m2): 22.4 (05-12-24 @ 15:37)    PHYSICAL EXAM:  GENERAL: NAD  HEENT:  AT/NC, moist mucous membranes, EOMI  CHEST/LUNG:  CTA b/l, no rales, wheezes, or rhonchi,  normal respiratory effort  HEART:  RRR, S1, S2, no murmurs; no pitting edema  ABDOMEN:  BS+, soft, nontender, nondistended  MSK/EXTREMITIES: 2+ peripheral pulses, no clubbing or cyanosis  NERVOUS SYSTEM: answers questions and follows commands appropriately, grossly moves all extremities   PSYCH: Appropriate affect, Alert & Awake; Good judgement      LABS: Personally reviewed  CBC                        12.9   10.73 )-----------( 157      ( 14 May 2024 08:16 )             37.4     CMP  05-14    140  |  107  |  23  ----------------------------<  101  4.2   |  25  |  1.34    Ca    9.2      14 May 2024 08:16  Mg     1.6     05-14    TPro  6.9  /  Alb  3.2  /  TBili  0.9  /  DBili  x   /  AST  18  /  ALT  15  /  AlkPhos  70  05-12                CARDIAC MARKERS ( 12 May 2024 19:27 )  x     / 24.8 ng/L / x     / x     / x          05-14 Chol 111 mg/dL LDL -- HDL 40 mg/dL Trig 50 mg/dL  TSH 2.943   TSH with FT4 reflex --  Total T3 --                  Urinalysis Basic - ( 14 May 2024 08:16 )    Color: x / Appearance: x / SG: x / pH: x  Gluc: 101 mg/dL / Ketone: x  / Bili: x / Urobili: x   Blood: x / Protein: x / Nitrite: x   Leuk Esterase: x / RBC: x / WBC x   Sq Epi: x / Non Sq Epi: x / Bacteria: x                RADIOLOGY & ADDITIONAL TESTS: Personally reviewed.   < from: MR Head No Cont (05.14.24 @ 12:09) >  1. Ischemic white matter disease lower range typical for age    2. Diffuse brain volume loss typical for age    3. Dolichoectasia posterior circulation most evident at the left   vertebral artery and basilar artery, to a lesser extent in the anterior   circulation.    < end of copied text >      Consultant(s) Notes Reviewed:  [x] YES  [ ] NO   Discussed with LADAN/EITAN, RN

## 2024-05-15 ENCOUNTER — RESULT REVIEW (OUTPATIENT)
Age: 89
End: 2024-05-15

## 2024-05-15 ENCOUNTER — TRANSCRIPTION ENCOUNTER (OUTPATIENT)
Age: 89
End: 2024-05-15

## 2024-05-15 VITALS
SYSTOLIC BLOOD PRESSURE: 136 MMHG | RESPIRATION RATE: 18 BRPM | TEMPERATURE: 97 F | HEART RATE: 70 BPM | DIASTOLIC BLOOD PRESSURE: 87 MMHG

## 2024-05-15 LAB
ANION GAP SERPL CALC-SCNC: 9 MMOL/L — SIGNIFICANT CHANGE UP (ref 5–17)
BUN SERPL-MCNC: 27 MG/DL — HIGH (ref 7–23)
CALCIUM SERPL-MCNC: 8.9 MG/DL — SIGNIFICANT CHANGE UP (ref 8.4–10.5)
CHLORIDE SERPL-SCNC: 106 MMOL/L — SIGNIFICANT CHANGE UP (ref 96–108)
CO2 SERPL-SCNC: 24 MMOL/L — SIGNIFICANT CHANGE UP (ref 22–31)
CREAT SERPL-MCNC: 1.24 MG/DL — SIGNIFICANT CHANGE UP (ref 0.5–1.3)
EGFR: 54 ML/MIN/1.73M2 — LOW
GLUCOSE SERPL-MCNC: 78 MG/DL — SIGNIFICANT CHANGE UP (ref 70–99)
MAGNESIUM SERPL-MCNC: 1.8 MG/DL — SIGNIFICANT CHANGE UP (ref 1.6–2.6)
POTASSIUM SERPL-MCNC: 4.2 MMOL/L — SIGNIFICANT CHANGE UP (ref 3.5–5.3)
POTASSIUM SERPL-SCNC: 4.2 MMOL/L — SIGNIFICANT CHANGE UP (ref 3.5–5.3)
SODIUM SERPL-SCNC: 139 MMOL/L — SIGNIFICANT CHANGE UP (ref 135–145)

## 2024-05-15 PROCEDURE — 84443 ASSAY THYROID STIM HORMONE: CPT

## 2024-05-15 PROCEDURE — 93005 ELECTROCARDIOGRAM TRACING: CPT

## 2024-05-15 PROCEDURE — 93308 TTE F-UP OR LMTD: CPT | Mod: 26

## 2024-05-15 PROCEDURE — 83735 ASSAY OF MAGNESIUM: CPT

## 2024-05-15 PROCEDURE — 84484 ASSAY OF TROPONIN QUANT: CPT

## 2024-05-15 PROCEDURE — 96361 HYDRATE IV INFUSION ADD-ON: CPT

## 2024-05-15 PROCEDURE — 85025 COMPLETE CBC W/AUTO DIFF WBC: CPT

## 2024-05-15 PROCEDURE — 80048 BASIC METABOLIC PNL TOTAL CA: CPT

## 2024-05-15 PROCEDURE — 96374 THER/PROPH/DIAG INJ IV PUSH: CPT

## 2024-05-15 PROCEDURE — 80061 LIPID PANEL: CPT

## 2024-05-15 PROCEDURE — 97161 PT EVAL LOW COMPLEX 20 MIN: CPT

## 2024-05-15 PROCEDURE — 85027 COMPLETE CBC AUTOMATED: CPT

## 2024-05-15 PROCEDURE — 36415 COLL VENOUS BLD VENIPUNCTURE: CPT

## 2024-05-15 PROCEDURE — 71045 X-RAY EXAM CHEST 1 VIEW: CPT

## 2024-05-15 PROCEDURE — 70551 MRI BRAIN STEM W/O DYE: CPT | Mod: MC

## 2024-05-15 PROCEDURE — 80053 COMPREHEN METABOLIC PANEL: CPT

## 2024-05-15 PROCEDURE — 70450 CT HEAD/BRAIN W/O DYE: CPT | Mod: MC

## 2024-05-15 PROCEDURE — 99232 SBSQ HOSP IP/OBS MODERATE 35: CPT

## 2024-05-15 PROCEDURE — C8924: CPT

## 2024-05-15 PROCEDURE — 99285 EMERGENCY DEPT VISIT HI MDM: CPT | Mod: 25

## 2024-05-15 PROCEDURE — 99239 HOSP IP/OBS DSCHRG MGMT >30: CPT

## 2024-05-15 PROCEDURE — 93306 TTE W/DOPPLER COMPLETE: CPT

## 2024-05-15 RX ORDER — DAPAGLIFLOZIN 10 MG/1
1 TABLET, FILM COATED ORAL
Qty: 30 | Refills: 0
Start: 2024-05-15 | End: 2024-06-13

## 2024-05-15 RX ORDER — ASPIRIN/CALCIUM CARB/MAGNESIUM 324 MG
1 TABLET ORAL
Qty: 30 | Refills: 0
Start: 2024-05-15 | End: 2024-06-13

## 2024-05-15 RX ORDER — METOPROLOL TARTRATE 50 MG
1 TABLET ORAL
Qty: 30 | Refills: 0
Start: 2024-05-15 | End: 2024-06-13

## 2024-05-15 RX ORDER — SACUBITRIL AND VALSARTAN 24; 26 MG/1; MG/1
1 TABLET, FILM COATED ORAL
Refills: 0 | Status: DISCONTINUED | OUTPATIENT
Start: 2024-05-15 | End: 2024-05-15

## 2024-05-15 RX ORDER — AMLODIPINE BESYLATE 2.5 MG/1
1 TABLET ORAL
Qty: 0 | Refills: 0 | DISCHARGE

## 2024-05-15 RX ORDER — SPIRONOLACTONE 25 MG/1
0.5 TABLET, FILM COATED ORAL
Qty: 15 | Refills: 0
Start: 2024-05-15 | End: 2024-06-13

## 2024-05-15 RX ORDER — SACUBITRIL AND VALSARTAN 24; 26 MG/1; MG/1
1 TABLET, FILM COATED ORAL
Qty: 60 | Refills: 0
Start: 2024-05-15 | End: 2024-06-13

## 2024-05-15 RX ADMIN — Medication 5 MILLIGRAM(S): at 05:16

## 2024-05-15 RX ADMIN — Medication 1 TABLET(S): at 12:04

## 2024-05-15 RX ADMIN — Medication 25 MILLIGRAM(S): at 05:16

## 2024-05-15 RX ADMIN — CALCITRIOL 0.25 MICROGRAM(S): 0.5 CAPSULE ORAL at 12:04

## 2024-05-15 RX ADMIN — Medication 81 MILLIGRAM(S): at 12:04

## 2024-05-15 NOTE — DISCHARGE NOTE NURSING/CASE MANAGEMENT/SOCIAL WORK - PATIENT PORTAL LINK FT
You can access the FollowMyHealth Patient Portal offered by [facility] by registering at the following website: [email or web address]. By joining [employer]’s FollowMyHealth portal, you will also be able to view your health information using other applications (apps) compatible with our system.

## 2024-05-15 NOTE — PROGRESS NOTE ADULT - NUTRITIONAL ASSESSMENT
This patient has been assessed with a concern for Malnutrition and has been determined to have a diagnosis/diagnoses of Moderate protein-calorie malnutrition.    This patient is being managed with:   Diet DASH/TLC-  Sodium & Cholesterol Restricted  1500mL Fluid Restriction ([id number])  Supplement Feeding Modality:  Oral  Ensure Plus High Protein Cans or Servings Per Day:  1       Frequency:  Daily     Special Instructions for Nursin milliLiter(s) to 2000 milliLiter(s) fluid restriction  Entered: May 14 2024  3:55PM    Diet NPO after Midnight-     NPO Start Date: 14-May-2024   NPO Start Time: 23:59  Entered: May 14 2024  3:51PM

## 2024-05-15 NOTE — PROGRESS NOTE ADULT - ASSESSMENT
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, heavily calcified atheromatous disease both V4 vertebral segments, gen cerebral volume loss, small vessel disease, no acute ICH/midline shift/infarct  - MR brain ordered  - ASA 81  - neuro consult, cardio consult  - PT recommends home PT  - TTE 5/13 LVEF 30-35% mod aortic regurgitation  - TTE with IV enhanced  - fall precautions    Systolic HF  - TTE 5/13 LVEF 30-35% mod aortic regurgitation  - cardio recs coronary angio  - pending nephro clearance  - GDMT - c/w metoprolol succinate 25mg qd; GDMT meds held for kidney fxn    Atrial fibrillation, paroxysmal  LBBB  - CHADsVasc score of 3  - TTE 5/13 LVEF 30-35% mod aortic regurgitation  - c/w ASA 81 on DC    Acute kidney injury, dehydration  CKD, mild  - BUN 34, Cr 1.7 on admission  - Cr baseline 1.29  - s/p IV LR@75 with some improvement  - avoid nephrotoxins  - trend renal fxn    Hypertension  Aortic insufficiency, chronic  - held home amlodipine  - s/p metoprolol tartrate 12.5mg q12h  - DC with metoprolol succinate 25mg qd  - orthostatic negative    Chronic dermatitis  - patient reports prednisone prescribed by dermatologist for rash, will continue home prednisone 5mg qd    BPH  - c/w flomax    DVT ppx  Lovenox    Discussed with Dr. Mac. 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 LVEF 30-35%  - fall precautions    Systolic HF  - TTE 5/13 LVEF 30-35% mod aortic regurgitation  - cardio recs coronary angio  - pending nephro clearance  - GDMT - c/w metoprolol succinate 25mg qd; entresto 24-26mg qd started as creatinine improved, will start farxiga and spironolactone upon discharge with outpatient follow up to monitor kidney fxn    Atrial fibrillation, paroxysmal  LBBB  - CHADsVasc score of 3  - TTE 5/13 LVEF 30-35% mod aortic regurgitation  - c/w ASA 81 on DC    Acute kidney injury, dehydration  CKD, mild  - BUN 34, Cr 1.7 on admission  - Cr baseline 1.29  - s/p IV LR@75 with some improvement  - avoid nephrotoxins  - trend renal fxn    Hypertension  Aortic insufficiency, chronic  - held home amlodipine  - s/p metoprolol tartrate 12.5mg q12h  - DC with metoprolol succinate 25mg qd  - orthostatic negative    Chronic dermatitis  - patient reports prednisone prescribed by dermatologist for rash, will continue home prednisone 5mg qd    BPH  - c/w flomax    DVT ppx  Lovenox    Discussed with Dr. Mac. 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 LVEF 30-35%  - fall precautions    Systolic HF  - TTE 5/13 LVEF 30-35% mod aortic regurgitation  - cardio recs coronary angio  - pending nephro clearance  - GDMT - c/w metoprolol succinate 25mg qd; entresto 24-26mg qd started as creatinine improved, will start farxiga and spironolactone upon discharge with outpatient follow up to monitor kidney fxn    Atrial fibrillation, paroxysmal  LBBB  - CHADsVasc score of 3  - TTE 5/13 LVEF 30-35% mod aortic regurgitation  - c/w ASA 81 on DC    Acute kidney injury, dehydration  CKD, mild  - BUN 34, Cr 1.7 on admission  - Cr baseline 1.29  - s/p IV LR@75 with some improvement  - avoid nephrotoxins  - trend renal fxn    Hypertension  Aortic insufficiency, chronic  - held home amlodipine  - s/p metoprolol tartrate 12.5mg q12h  - DC with metoprolol succinate 25mg qd  - orthostatic negative    Chronic dermatitis  - patient reports prednisone prescribed by dermatologist for rash, will continue home prednisone 5mg qd    BPH  - c/w flomax    DVT ppx  Lovenox    Discussed with Dr. Mac.

## 2024-05-15 NOTE — PROGRESS NOTE ADULT - SUBJECTIVE AND OBJECTIVE BOX
Patient is a 89y old  Male who presents with a chief complaint of dizziness, worsening (15 May 2024 09:17)      INTERVAL HPI/OVERNIGHT EVENTS: Patient seen and examined at bedside. No overnight events.    MEDICATIONS  (STANDING):  aspirin enteric coated 81 milliGRAM(s) Oral daily  calcitriol   Capsule 0.25 MICROGram(s) Oral daily  calcium carbonate 1250 mG  + Vitamin D (OsCal 500 + D) 1 Tablet(s) Oral daily  enoxaparin Injectable 40 milliGRAM(s) SubCutaneous <User Schedule>  metoprolol succinate ER 25 milliGRAM(s) Oral daily  predniSONE   Tablet 5 milliGRAM(s) Oral daily  sacubitril 24 mG/valsartan 26 mG 1 Tablet(s) Oral two times a day  tamsulosin 0.8 milliGRAM(s) Oral at bedtime    MEDICATIONS  (PRN):  acetaminophen     Tablet .. 650 milliGRAM(s) Oral every 6 hours PRN Temp greater or equal to 38C (100.4F), Mild Pain (1 - 3)  meclizine 12.5 milliGRAM(s) Oral four times a day PRN Dizziness      Allergies    No Known Allergies    Intolerances      Vital Signs Last 24 Hrs  T(C): 36.4 (15 May 2024 12:00), Max: 36.5 (14 May 2024 20:00)  T(F): 97.5 (15 May 2024 12:00), Max: 97.7 (14 May 2024 20:00)  HR: 68 (15 May 2024 12:00) (68 - 80)  BP: 115/54 (15 May 2024 12:00) (115/54 - 138/82)  BP(mean): --  RR: 16 (15 May 2024 12:00) (15 - 21)  SpO2: 96% (15 May 2024 12:00) (96% - 98%)    Parameters below as of 15 May 2024 12:00  Patient On (Oxygen Delivery Method): room air      I&O's Summary    BMI (kg/m2): 22.4 (05-12-24 @ 15:37)    PHYSICAL EXAM:  GENERAL: NAD  HEENT:  AT/NC, moist mucous membranes, EOMI  CHEST/LUNG:  CTA b/l, no rales, wheezes, or rhonchi,  normal respiratory effort  HEART:  RRR, S1, S2, no murmurs; no pitting edema  ABDOMEN:  BS+, soft, nontender, nondistended  MSK/EXTREMITIES: 2+ peripheral pulses, no clubbing or cyanosis  NERVOUS SYSTEM: answers questions and follows commands appropriately, grossly moves all extremities   PSYCH: Appropriate affect, Alert & Awake; Good judgement      LABS: Personally reviewed  CBC                        12.9   10.73 )-----------( 157      ( 14 May 2024 08:16 )             37.4     CMP  05-15    139  |  106  |  27  ----------------------------<  78  4.2   |  24  |  1.24    Ca    8.9      15 May 2024 06:05  Mg     1.8     05-15    TPro  6.9  /  Alb  3.2  /  TBili  0.9  /  DBili  x   /  AST  18  /  ALT  15  /  AlkPhos  70  05-12                CARDIAC MARKERS ( 12 May 2024 19:27 )  x     / 24.8 ng/L / x     / x     / x          05-14 Chol 111 mg/dL LDL -- HDL 40 mg/dL Trig 50 mg/dL  TSH 2.943   TSH with FT4 reflex --  Total T3 --                  Urinalysis Basic - ( 15 May 2024 06:05 )    Color: x / Appearance: x / SG: x / pH: x  Gluc: 78 mg/dL / Ketone: x  / Bili: x / Urobili: x   Blood: x / Protein: x / Nitrite: x   Leuk Esterase: x / RBC: x / WBC x   Sq Epi: x / Non Sq Epi: x / Bacteria: x                RADIOLOGY & ADDITIONAL TESTS: Personally reviewed.     Consultant(s) Notes Reviewed:  [x] YES  [ ] NO   Discussed with LADAN/EITAN, RN     Patient is a 89y old  Male who presents with a chief complaint of dizziness, worsening (15 May 2024 09:17)      INTERVAL HPI/OVERNIGHT EVENTS: Patient seen and examined at bedside. No overnight events.    MEDICATIONS  (STANDING):  aspirin enteric coated 81 milliGRAM(s) Oral daily  calcitriol   Capsule 0.25 MICROGram(s) Oral daily  calcium carbonate 1250 mG  + Vitamin D (OsCal 500 + D) 1 Tablet(s) Oral daily  enoxaparin Injectable 40 milliGRAM(s) SubCutaneous <User Schedule>  metoprolol succinate ER 25 milliGRAM(s) Oral daily  predniSONE   Tablet 5 milliGRAM(s) Oral daily  sacubitril 24 mG/valsartan 26 mG 1 Tablet(s) Oral two times a day  tamsulosin 0.8 milliGRAM(s) Oral at bedtime    MEDICATIONS  (PRN):  acetaminophen     Tablet .. 650 milliGRAM(s) Oral every 6 hours PRN Temp greater or equal to 38C (100.4F), Mild Pain (1 - 3)  meclizine 12.5 milliGRAM(s) Oral four times a day PRN Dizziness      Allergies    No Known Allergies    Intolerances      Vital Signs Last 24 Hrs  T(C): 36.4 (15 May 2024 12:00), Max: 36.5 (14 May 2024 20:00)  T(F): 97.5 (15 May 2024 12:00), Max: 97.7 (14 May 2024 20:00)  HR: 68 (15 May 2024 12:00) (68 - 80)  BP: 115/54 (15 May 2024 12:00) (115/54 - 138/82)  BP(mean): --  RR: 16 (15 May 2024 12:00) (15 - 21)  SpO2: 96% (15 May 2024 12:00) (96% - 98%)    Parameters below as of 15 May 2024 12:00  Patient On (Oxygen Delivery Method): room air      I&O's Summary    BMI (kg/m2): 22.4 (05-12-24 @ 15:37)    PHYSICAL EXAM:  GENERAL: NAD  HEENT:  AT/NC, moist mucous membranes, EOMI  CHEST/LUNG:  CTA b/l, no rales, wheezes, or rhonchi,  normal respiratory effort  HEART:  RRR, S1, S2, no murmurs; no pitting edema  ABDOMEN:  BS+, soft, nontender, nondistended  MSK/EXTREMITIES: 2+ peripheral pulses, no clubbing or cyanosis  NERVOUS SYSTEM: answers questions and follows commands appropriately, grossly moves all extremities   PSYCH: Appropriate affect, Alert & Awake; Good judgement      LABS: Personally reviewed  CBC                        12.9   10.73 )-----------( 157      ( 14 May 2024 08:16 )             37.4     CMP  05-15    139  |  106  |  27  ----------------------------<  78  4.2   |  24  |  1.24    Ca    8.9      15 May 2024 06:05  Mg     1.8     05-15    TPro  6.9  /  Alb  3.2  /  TBili  0.9  /  DBili  x   /  AST  18  /  ALT  15  /  AlkPhos  70  05-12                CARDIAC MARKERS ( 12 May 2024 19:27 )  x     / 24.8 ng/L / x     / x     / x          05-14 Chol 111 mg/dL LDL -- HDL 40 mg/dL Trig 50 mg/dL  TSH 2.943   TSH with FT4 reflex --  Total T3 --                  Urinalysis Basic - ( 15 May 2024 06:05 )    Color: x / Appearance: x / SG: x / pH: x  Gluc: 78 mg/dL / Ketone: x  / Bili: x / Urobili: x   Blood: x / Protein: x / Nitrite: x   Leuk Esterase: x / RBC: x / WBC x   Sq Epi: x / Non Sq Epi: x / Bacteria: x                RADIOLOGY & ADDITIONAL TESTS: Personally reviewed.   < from: MR Head No Cont (05.14.24 @ 12:09) >  1. Ischemic white matter disease lower range typical for age    2. Diffuse brain volume loss typical for age    3. Dolichoectasia posterior circulation most evident at the left   vertebral artery and basilar artery, to a lesser extent in the anterior   circulation.    < end of copied text >      Consultant(s) Notes Reviewed:  [x] YES  [ ] NO   Discussed with LADAN/EITAN, RN

## 2024-05-15 NOTE — PROGRESS NOTE ADULT - SUBJECTIVE AND OBJECTIVE BOX
[facility]  [id number]      Chief Complaint: New systolic heart failure    Interval History: The patient denies chest pain or shortness of breath.     Tele: sinus rhythm 70s BPM    Current meds:   acetaminophen     Tablet .. 650 milliGRAM(s) Oral every 6 hours PRN  aspirin enteric coated 81 milliGRAM(s) Oral daily  calcitriol   Capsule 0.25 MICROGram(s) Oral daily  calcium carbonate 1250 mG  + Vitamin D (OsCal 500 + D) 1 Tablet(s) Oral daily  enoxaparin Injectable 40 milliGRAM(s) SubCutaneous <User Schedule>  meclizine 12.5 milliGRAM(s) Oral four times a day PRN  metoprolol succinate ER 25 milliGRAM(s) Oral daily  predniSONE   Tablet 5 milliGRAM(s) Oral daily  tamsulosin 0.8 milliGRAM(s) Oral at bedtime      Objective:     Vital Signs:   T(C): 36.4 (05-15-24 @ 05:04), Max: 36.5 (05-14-24 @ 20:00)  HR: 73 (05-15-24 @ 05:04) (73 - 88)  BP: 135/63 (05-15-24 @ 05:04) (135/63 - 138/82)  RR: 15 (05-15-24 @ 05:04) (15 - 21)  SpO2: 97% (05-15-24 @ 05:04) (97% - 98%)  Wt(kg): --    Physical Exam:   General: no distress  HEENT: sclera anicteric  Neck: supple  CVS: JVP ~ 7 cm H20, RRR, s1, s2  Chest: unlabored respirations, clear to auscultation b/l  Abdomen: non-distended  Extremities: no lower extremity edema b/l  Neuro: awake, alert & oriented  Psych: normal affect      Labs:   15 May 2024 06:05    139    |  106    |  27     ----------------------------<  78     4.2     |  24     |  1.24     Ca    8.9        15 May 2024 06:05  Mg     1.8       15 May 2024 06:05                            12.9   10.73 )-----------( 157      ( 14 May 2024 08:16 )             37.4           Nuclear stress test (2019):  Normal     Outpatient TTE (3/2023):  Mildly impaired LV systolic function  Moderate AR, Mild-moderate TR      TTE (5/13/24):   1. Moderately decreased global left ventricular systolic function.   2. Left ventricular ejection fraction, by visual estimation, is 30 to 35%.   3. Moderate global left ventricle hypokinesis. Consider use of IV   ultrasonic enhancing agent to better evaluate regional wall motion and apex, if clinically indicated.   4. Mildly increased left ventricular internal cavity size.   5. Mildly increased LV wall thickness.   6. Mildly enlarged right ventricle with normal right ventricle systolic   function.   7. Right atrial enlargement.   8. Degenerative mitral valve.   9. Moderate thickening of the anterior and posterior mitral valve   leaflets.  10. Mild mitral valve regurgitation.  11. Mild tricuspid regurgitation.  12. Aortic valve leaflet calcification. No aortic valve stenosis.  13. Moderate aortic regurgitation.  14. Top normal sized aorta at the Sinuses of Valsalva (3.8 cm). Top   normal proximal ascending aorta (3.9 cm).  15. Stasis of flow noted in the IVC with increased risk for thrombus   formation. Recommend clinical correlation.  16. There is no evidence of pericardial effusion.    ECG (5/12/24): sinus rhythm, PACs, LBBB (LBBB present on outpatient ECG)

## 2024-05-15 NOTE — PROGRESS NOTE ADULT - SUBJECTIVE AND OBJECTIVE BOX
No CP, SOB    Vital Signs Last 24 Hrs  T(C): 36.3 (05-15-24 @ 15:43), Max: 36.4 (05-15-24 @ 05:04)  T(F): 97.3 (05-15-24 @ 15:43), Max: 97.5 (05-15-24 @ 05:04)  HR: 70 (05-15-24 @ 15:43) (68 - 73)  BP: 136/87 (05-15-24 @ 15:43) (115/54 - 136/87)  BP(mean): 103 (05-15-24 @ 15:43) (103 - 103)  RR: 18 (05-15-24 @ 15:43) (15 - 18)  SpO2: 96% (05-15-24 @ 12:00) (96% - 97%)    s1s2  b/l air entry  soft, ND  tr edema                        12.9   10.73 )-----------( 157      ( 14 May 2024 08:16 )             37.4     15 May 2024 06:05    139    |  106    |  27     ----------------------------<  78     4.2     |  24     |  1.24     Ca    8.9        15 May 2024 06:05  Mg     1.8       15 May 2024 06:05    acetaminophen     Tablet .. 650 milliGRAM(s) Oral every 6 hours PRN  aspirin enteric coated 81 milliGRAM(s) Oral daily  calcitriol   Capsule 0.25 MICROGram(s) Oral daily  calcium carbonate 1250 mG  + Vitamin D (OsCal 500 + D) 1 Tablet(s) Oral daily  enoxaparin Injectable 40 milliGRAM(s) SubCutaneous <User Schedule>  meclizine 12.5 milliGRAM(s) Oral four times a day PRN  metoprolol succinate ER 25 milliGRAM(s) Oral daily  predniSONE   Tablet 5 milliGRAM(s) Oral daily  sacubitril 24 mG/valsartan 26 mG 1 Tablet(s) Oral two times a day  tamsulosin 0.8 milliGRAM(s) Oral at bedtime    A/P:    CKD 3 at baseline, UA negative   CM, EF 30 - 35%, mod AR, Afib  Pt is at small risk of ASHER due to IV dye study  Cardiac benefits of cath may justify potential renal risk  No renal contraindications to cardiac cath if indicated  Avoid nephrotoxins, no NSAID's  F/u BMP closely on Entresto  D/w family     [phone]

## 2024-05-15 NOTE — HISTORY OF PRESENT ILLNESS
[FreeTextEntry1] : Pt in hospital, not seen [de-identified] : Most pleasant 89-year-old white male with history of ITP (platelets 154 recently) atopic dermatitis hypertension left bundle branch block moderate aortic insufficiency CKD stage 3, who comes in for follow-up visit 1 week after CPE .  He struggles with disequilibrium.  Neuro evaluation noncontrib, underwent head MRI US carotids, transcranial doppler.  Saw cardiology who did not feel that this was cardiac. For suggestion of orthostatism, asked to move amlodipine 5mg to bedtime without benefit, switched to lisinopril 10mg/HCTZ 12.5 q AM without benefit. He thinks actually worsened, stopped after 2 doses, but still troubled. Labs noncontributory other than BNP 2100.

## 2024-05-15 NOTE — PROGRESS NOTE ADULT - ASSESSMENT
Assessment:  Smooth Sherman is a 89 year old man with past medical history of Mild cardiomyopathy, Hypertension, Aortic insufficiency, Mild CKD, Chronic ITP who presented with dizziness and generalized weakness, with recent fall, found to have systolic heart failure.     The patient reports history of leg edema and dyspnea on exertion for several months now. ECG consistent with sinus rhythm, PACs and LBBB, the LBBB is old. Troponin negative. Echo consistent with LVEF 30-35%, moderate aortic regurgitation, stasis of flow in IVC, no obvious thrombus visualized. Telemetry consistent with sinus rhythm with PACs. CXR consistent with clear lungs. CT head consistent with cerebral changes without acute intracranial hemorrhage.    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    Recommendations:  [] Systolic heart failure: Likely subacute presentation. Near-euvolemic. In regards to etiology, recommend evaluation for ischemic cardiomyopathy; discussed possible coronary angiogram with patient and family, he is at increased risk due to age of 89 and CKD. Nephrology has cleared patient for angiogram. The patient reports this morning that he prefers medical management and does not want to proceed with angiogram due to his age, discussed risks of unrevascularized CAD and fatal arrhythmias and patient verbalizes appropriate understanding. In regards to guideline directed medical therapy, continue Metoprolol succinate 25 mg daily. Cr has improved, would start Entresto 24/26 mg BID with monitoring of renal function. Can consider MRA as outpatient. Plan for repeat echo today with IV ultrasonic enhancing agent to ensure no thrombus. Continue to monitor on telemetry, no signs of atrial fibrillation at this time, recommend extended outpatient cardiac event monitoring.  Moderate AR can be monitored at this time, may need structural heart team evaluation as outpatient. Continue Aspirin 81 mg daily.     Discussed with primary team. Will sign out this case to cardiologist to follow along tomorrow, likely discharge home tomorrow with close outpatient cardiology follow up.    Madie Clements MD  Cardiology     Assessment:  Smooth Sherman is a 89 year old man with past medical history of Mild cardiomyopathy, Hypertension, Aortic insufficiency, Mild CKD, Chronic ITP who presented with dizziness and generalized weakness, with recent fall, found to have systolic heart failure.     The patient reports history of leg edema and dyspnea on exertion for several months now. ECG consistent with sinus rhythm, PACs and LBBB, the LBBB is old. Troponin negative. Echo consistent with LVEF 30-35%, moderate aortic regurgitation, stasis of flow in IVC, no obvious thrombus visualized. Telemetry consistent with sinus rhythm with PACs. CXR consistent with clear lungs. CT head consistent with cerebral changes without acute intracranial hemorrhage.    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    Recommendations:  [] Systolic heart failure: Likely subacute presentation. Near-euvolemic. In regards to etiology, recommend evaluation for ischemic cardiomyopathy; discussed possible coronary angiogram with patient and family, he is at increased risk due to age of 89 and CKD. Nephrology has cleared patient for angiogram. The patient reports this morning that he prefers medical management and does not want to proceed with angiogram due to his age, discussed risks of unrevascularized CAD and fatal arrhythmias and patient verbalizes appropriate understanding. In regards to guideline directed medical therapy, continue Metoprolol succinate 25 mg daily. Cr has improved, would start Entresto 24/26 mg BID with monitoring of renal function. Can consider MRA as outpatient. Plan for repeat echo today with IV ultrasonic enhancing agent to ensure no thrombus. Continue to monitor on telemetry, no signs of atrial fibrillation at this time, recommend extended outpatient cardiac event monitoring.  Moderate AR can be monitored at this time, may need structural heart team evaluation as outpatient. Continue Aspirin 81 mg daily.     Discussed with primary team. Will sign out this case to cardiologist to follow along tomorrow, likely discharge home tomorrow with close outpatient cardiology follow up.    Addendum:    TTE Report:   1. Limited echocardiogram performed to evaluate for left ventricle   thrombus.   2. Moderately decreased global left ventricular systolic function.   3. Left ventricular ejection fraction, by visual estimation, is 30 to 35%.   4. No obvious large left ventricle thrombus visualized with use of IV   ultrasonic enhancing agent. Swirling of IV ultrasonic enhancing agent   likely secondary to low flow state. There are small filling defects   visualized on the short axis of the left ventricle which correlates to   papillary muscles in comparison to recent echocardiogram dated 5/13/2024 (images reviewed). Moderate global left ventricle hypokinesis. Abnormal septal motion likely secondary to conduction delay.   5. There is stasis of flow visualized in the IVC. No obvious filling   defects visualized with use of IV ultrasonic enhancing agent.   6. Endocardial visualization was enhanced with intravenous echo contrast.   7. 5mL of definity mixed with saline used. LOT # [id number] EXP 4/25.    Echo repeated with IV ultrasonic enhancing agent and no thrombus visualized. Patient requesting to go home today and being discharged, discussed with primary team. He will follow up as outpatient with cardiology office.    Madie Clements MD  Cardiology

## 2024-05-15 NOTE — ASSESSMENT
[FreeTextEntry1] : *Osteoporosis. T-score -3.6 right femoral neck, November 2023. Risk factors chronic corticosteroid use, chronic kidney disease. Update PTH vitamin D. Willing to televisit with osteoporosis clinic, referral sent  *Left bundle branch block *Moderate aortic regurgitation. Nuclear perfusion study negative for myocardial ischemia 2019. 2023 echo follow-up leaky aortic valve moderate AR mild MR mild to moderate TR EF 52%, and 2023Holter mean 72 bpm, range 48-1 02 with frequent APCs, 15% of beats; and typically 3 beat runs of atrial tachycardia longest 17 beats lasting about 8 seconds.  Update EKG on return to screen for progression of left bundle to bifascicular block.  *Hypertension. Amlodipine 5 hs. Blood pressure in the clinic today appears adequate. Creatinine 1.5 2023, see below. Plan switch to lisinopril 10/HCTZ 12.5 q AM to see if helps with ankle edema. Check BMP now and in 2 weeks.  *Dry friable skin. Asked to apply cerave cream daily to extremities.  *Ankle edema. Discontinue amlodipine and start at least calf high compression hose, and improve skin moisturization to reduce risk of dermatitis and skin breakdown.  *Chronic disequilibrium. Patient feels "off" when he lays flat in bed but states that it is worsened when he is up right. He tried walking a mile at the track he had the day, having to frequently rest.  Extensive workup neurology and cardiology failed to identify a discrete reversible cause other than orthostatic hypotension. Today blood pressure supine to standing 110/56 pulse 54 versus 118/70 pulse 93 standing. Suspect volume depletion, asked to drink 2 quarts of water a day, engage physical therapy on strengthening though not vestibular rehab. Do not expect much dehydration from 12.5 mg hydrochlorothiazide.  *Chronic kidney disease stage IIIa. *Secondary hyperparathyroidism.  In 2021 creatinine 1.19, early 2023 1.29, though looking back to 2017 in 2020 creatinine about 1.4. Rising creatinine prompted trial holding ARB summer 2023, cardiology informed, without durable recovery of renal function PTH elevated at 90 vitamin D levels fair though not excellent without history of stone and PSA stable. Nephrology kindly obtained renal ultrasound which showed no hydronephrosis, agreed most likely hypertensive nephropathy. No specific recommendations forthcoming regarding vitamin D supplementation from them, in August we started patient on calcitriol 0.25 mcg daily plus calcium/vitamin D 600-10 twice daily. Update PTH, vitamin D, renal panel.  *Benign prostatic hyperplasia. Sees Dr. Mccoy and urology prescribed alfuzosin 10 mg. At follow up, will ask to hold briefly to see if any improvement in his disequilibrium.  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 Followed by Dr. Floyd hematology every 3 to 6 months. Plat 150-290 past few yrs.  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 Followed closely by Dr. Pickard. Asked to see for Left ear lesion if not healing on rtn..  *Subclinical hypothyroidism. TSH just above the upper limit of normal March 2023 recheck now.  *Routine adult health maintenance. Has aged out of colon cancer screening, AAA screening, HPV HCV HIV screening. Technically eligible for Tdap Prevnar 20 Shingrix and this fall COVID monovalent booster and influenza. Screening for thyroid disease, diabetes now. Would stop lipid screen at this age.  It was a pleasure to visit with Mr. Sherman today. Answered all questions as best I could.  Pt hospitalized, not seen

## 2024-05-15 NOTE — PROGRESS NOTE ADULT - REASON FOR ADMISSION
dizziness, worsening  new atrial fib
dizziness, worsening
dizziness, worsening  new atrial fib
dizziness, worsening
dizziness, worsening  new atrial fib

## 2024-05-17 ENCOUNTER — APPOINTMENT (OUTPATIENT)
Dept: INTERNAL MEDICINE | Facility: CLINIC | Age: 89
End: 2024-05-17
Payer: MEDICARE

## 2024-05-17 VITALS
HEART RATE: 73 BPM | TEMPERATURE: 97.7 F | OXYGEN SATURATION: 97 % | SYSTOLIC BLOOD PRESSURE: 122 MMHG | BODY MASS INDEX: 21.9 KG/M2 | HEIGHT: 70 IN | RESPIRATION RATE: 16 BRPM | DIASTOLIC BLOOD PRESSURE: 76 MMHG | WEIGHT: 153 LBS

## 2024-05-17 DIAGNOSIS — M81.0 AGE-RELATED OSTEOPOROSIS W/OUT CURRENT PATHOLOGICAL FRACTURE: ICD-10-CM

## 2024-05-17 DIAGNOSIS — I42.9 CARDIOMYOPATHY, UNSPECIFIED: ICD-10-CM

## 2024-05-17 LAB — COLLAGEN CTX SERPL-MCNC: 135 PG/ML

## 2024-05-17 PROCEDURE — 99496 TRANSJ CARE MGMT HIGH F2F 7D: CPT

## 2024-05-17 RX ORDER — LISINOPRIL AND HYDROCHLOROTHIAZIDE TABLETS 10; 12.5 MG/1; MG/1
10-12.5 TABLET ORAL DAILY
Qty: 90 | Refills: 1 | Status: DISCONTINUED | COMMUNITY
Start: 2024-04-30 | End: 2024-05-17

## 2024-05-17 RX ORDER — MECLIZINE HYDROCHLORIDE 25 MG/1
25 TABLET ORAL
Qty: 15 | Refills: 0 | Status: ACTIVE | COMMUNITY
Start: 2024-05-17 | End: 1900-01-01

## 2024-05-17 RX ORDER — ALFUZOSIN HYDROCHLORIDE 10 MG/1
10 TABLET, EXTENDED RELEASE ORAL
Qty: 90 | Refills: 0 | Status: ACTIVE | COMMUNITY
Start: 2024-03-07

## 2024-05-17 NOTE — ASSESSMENT
[FreeTextEntry1] : *Progressive cardiomyopathy of uncertain etiology. EF 30% now Started on Entresto metoprolol. *Left bundle branch block, old. *Moderate aortic regurgitation. Nuclear perfusion study negative for myocardial ischemia 2019. 2023 echo follow-up leaky aortic valve moderate AR mild MR mild to moderate TR EF 52%, and 2023Holter mean 72 bpm, range 48-1 02 with frequent APCs, 15% of beats; and typically 3 beat runs of atrial tachycardia longest 17 beats lasting about 8 seconds.  In past year decline in EF to 30% with global hypokinesis. Ddex rate related cardiomyopathy, nonischemic cmpy, rapid progression of CAD. Declined angiography, wonder about CAC to inform statin intensity, not currently tx'd. Agree with Entresto trial, consider coreg instead of metoprolol watching orthostatics, follow up echo in 3-6 months. Has appt w cardiologist next week with spironolactone and Farxiga to be started potentially.  Ordered f/u BMP BNP to be done next week in advance of cardiology.  *Osteoporosis. T-score -3.6 right femoral neck, November 2023. Risk factors chronic corticosteroid use, chronic kidney disease. Update PTH vitamin D. Willing to televisit with osteoporosis clinic, referral sent  *Hypertensionl. Stable Creatinine 1.3-1.5  *Dry friable skin. Asked to apply cerave cream daily to extremities.  *Ankle edema. Off amlodipine and prev asked to start at least calf high compression hose, and improve skin moisturization to reduce risk of dermatitis and skin breakdown. Holding amlodipine and rx'g CHF appears to have helped. Down 3#.   *Chronic disequilibrium. Patient feels "off" when he lays flat in bed but states that it is worsened when he is up right. He tried walking a mile at the track a couple of weeks ago, having to frequently rest due to deconditioning and declining cardiac function. Communicated with Dr Romero/neurology who did not feel the basilar ectasia impinging on ventral alfred was symptomatic.  Offered re consultation w neuro, pt wishes to hold off. Stressed cane use at night during nocturia.  Volume depletion, taking alfuzosin in AM potentially contributory. Inconsistently orthostatic. Asked to drink 1.5qt/day, take alfuzosin at bedtime. Trial antevert 25mg, watching for sedation.  *Chronic kidney disease stage IIIb. *Secondary hyperparathyroidism.  In 2021 creatinine 1.19, early 2023 1.29, though looking back to 2017 in 2020 creatinine about 1.4. Rising creatinine prompted trial holding ARB summer 2023, cardiology informed, without durable recovery of renal function PTH elevated at 90 vitamin D levels fair though not excellent without history of stone and PSA stable. Nephrology kindly obtained renal ultrasound which showed no hydronephrosis, agreed most likely hypertensive nephropathy. No specific recommendations forthcoming regarding vitamin D supplementation from them, in August we started patient on calcitriol 0.25 mcg daily plus calcium/vitamin D 600-10 twice daily.  PTH, vitamin D, renal panel 5/9/24 reassuring, also without hyperkal or met acidosis.  *Benign prostatic hyperplasia. Sees Dr. Mccoy and urology prescribed alfuzosin 10 mg. May to hold briefly to see if any improvement in his disequilibrium.  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 Followed by Dr. Floyd hematology every 3 to 6 months. Plat 150-290 past few yrs.  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 Followed closely by Dr. Pickard. Asked to see for Left ear lesion if not healing on rtn..  *Routine adult health maintenance. Has aged out of colon cancer screening, AAA screening, HPV HCV HIV screening. Technically eligible for Tdap Prevnar 20 Shingrix and this fall COVID monovalent booster and influenza. Screening for thyroid disease, diabetes negative 2024.   It was a pleasure to visit with Mr. Sherman today. Answered all questions as best I could. 3 months f/u.

## 2024-05-17 NOTE — HISTORY OF PRESENT ILLNESS
[Post-hospitalization from ___ Hospital] : Post-hospitalization from [unfilled] Hospital [Discharge Summary] : discharge summary [Pertinent Labs] : pertinent labs [Discharge Med List] : discharge medication list [Admitted on: ___] : The patient was admitted on [unfilled] [Discharged on ___] : discharged on [unfilled] [FreeTextEntry2] : Most pleasant 89-year-old white male with history of ITP (platelets 154 recently) atopic dermatitis hypertension left bundle branch block moderate aortic insufficiency CKD stage 3 ankle swelling and chronic disequilibrium perhaps in part due to orthostatism prompting recent switch to lisinopril HCTZ from amlodipine, work up notable for BNP 2100, who fell after 2 doses (and stopped this new med without clear improvement in his dizzyness), presented to ED for fatigue, inability to fall asleep (after sleeping all day the day before) and possibly worsening dizziness, found to have R ventral alfred compression and substantial decline in cardiac systolic function from 50 down to 30% on sequential echoes 2023 vs now.  2019 per perfusion study negative for significant CAD burden, declined coronary angiography during this hospitalization.  Inpatient, Switched from lisinopril HCTZ (which the patient had stopped) to Farxiga spironolactone Entresto and low-dose metoprolol, but was told to not start the first 2 til f/u blood work and Cardiology f/u next week.  He tells me that his dizziness was so bad that he basically spent the day in bed before going to the ER.  alfuzosin 10 mg oral tablet, extended release: 1 tab(s) orally once a day (at bedtime) aspirin 81 mg oral delayed release tablet: 1 tab(s) orally once a day calcitriol 0.25 mcg oral capsule: 1 cap(s) orally once a day calcium (as carbonate)-vitamin D 600 mg-400 intl units (10 mcg) oral tablet: 1tab(s) orally once a day NOT YET STARTED: Farxiga 10 mg oral tablet: 1 tab(s) orally once a day metoprolol succinate 25 mg oral tablet, extended release: 1 tab(s) orally once a day predniSONE 5 mg oral tablet: 1 tab(s) orally once a day sacubitril-valsartan 24 mg-26 mg oral tablet: 1 tab(s) orally 2 times a day NOT YET STARTED: spironolactone 25 mg oral tablet: 0.5 tab(s) orally once a day  Patient is feeling down, hopeless. Sad his dizzyness no better.

## 2024-05-17 NOTE — PHYSICAL EXAM
[No JVD] : no jugular venous distention [Non Tender] : non-tender [Normal] : normal gait, coordination grossly intact, no focal deficits and deep tendon reflexes were 2+ and symmetric [de-identified] : No HJR at 80 degrees [de-identified] : occl ectopy. Diastolic decrescendo murmur, apical and RLSB HSM 1/6 [de-identified] : submalleolar ankle edema [de-identified] : maral BS+ [de-identified] : friable

## 2024-05-23 ENCOUNTER — APPOINTMENT (OUTPATIENT)
Dept: CARDIOLOGY | Facility: CLINIC | Age: 89
End: 2024-05-23
Payer: MEDICARE

## 2024-05-23 ENCOUNTER — NON-APPOINTMENT (OUTPATIENT)
Age: 89
End: 2024-05-23

## 2024-05-23 VITALS — DIASTOLIC BLOOD PRESSURE: 80 MMHG | SYSTOLIC BLOOD PRESSURE: 113 MMHG | HEART RATE: 60 BPM

## 2024-05-23 VITALS — OXYGEN SATURATION: 97 % | HEIGHT: 70 IN | HEART RATE: 67 BPM | BODY MASS INDEX: 22.48 KG/M2 | WEIGHT: 157 LBS

## 2024-05-23 DIAGNOSIS — N18.32 CHRONIC KIDNEY DISEASE, STAGE 3B: ICD-10-CM

## 2024-05-23 DIAGNOSIS — R42 DIZZINESS AND GIDDINESS: ICD-10-CM

## 2024-05-23 LAB
ANION GAP SERPL CALC-SCNC: 12 MMOL/L
BUN SERPL-MCNC: 26 MG/DL
CALCIUM SERPL-MCNC: 10 MG/DL
CHLORIDE SERPL-SCNC: 105 MMOL/L
CO2 SERPL-SCNC: 25 MMOL/L
CREAT SERPL-MCNC: 1.46 MG/DL
EGFR: 45 ML/MIN/1.73M2
GLUCOSE SERPL-MCNC: 81 MG/DL
NT-PROBNP SERPL-MCNC: 2270 PG/ML
POTASSIUM SERPL-SCNC: 4.3 MMOL/L
SODIUM SERPL-SCNC: 141 MMOL/L

## 2024-05-23 PROCEDURE — 99215 OFFICE O/P EST HI 40 MIN: CPT

## 2024-05-23 PROCEDURE — 93000 ELECTROCARDIOGRAM COMPLETE: CPT

## 2024-05-23 PROCEDURE — G2211 COMPLEX E/M VISIT ADD ON: CPT

## 2024-05-23 RX ORDER — CARVEDILOL 3.12 MG/1
3.12 TABLET, FILM COATED ORAL
Qty: 180 | Refills: 3 | Status: ACTIVE | COMMUNITY
Start: 2024-05-23 | End: 1900-01-01

## 2024-05-23 RX ORDER — METOPROLOL SUCCINATE 25 MG/1
25 TABLET, EXTENDED RELEASE ORAL
Refills: 0 | Status: DISCONTINUED | COMMUNITY
End: 2024-05-23

## 2024-05-23 NOTE — ASSESSMENT
[FreeTextEntry1] : In summary, the patient is a 89-year-old man with chronic dizziness.  By history his dizziness has absolutely nothing to do with his cardiac situation.  As he is always dizzy according to him and has been dizzy for many years.  As mentioned he is dizzy while sitting here hemodynamically stable in my office.  With regards to his cardiomyopathy, it is unclear whether he has ever actually had congestive heart failure as he denies shortness of breath his leg edema may have been due to amlodipine and his chest x-ray is clear.  Nonetheless he does have a decreased ejection fraction and an elevated BNP  For now we will switch metoprolol to carvedilol 3.125 mg twice per day Continue Entresto Would start Farxiga Would not give Aldactone due to renal insufficiency and fear of significant hyperkalemia  Patient will return for follow-up in 1 month's time and will obtain renal profile just before that visit

## 2024-05-23 NOTE — REASON FOR VISIT
[Other: ____] : [unfilled] [FreeTextEntry1] : Patient returns after 10 months for evaluation.  He was recently hospitalized after being weak and falling.  He continues to complain of dizziness.  He is dizzy right now as we speak according to him.  He was found in the hospital to have a decrease in his ejection fraction and at some point he had leg edema.  However it is felt that his leg edema may have been due to amlodipine.  His chest x-ray at the time of hospitalization revealed clear lung fields.  His BNP however was elevated to over 2000.  He ultimately was started on metoprolol as well as Entresto.  He was told to discuss spironolactone and Farxiga with me.  He denies chest discomfort shortness of breath

## 2024-06-11 RX ORDER — DAPAGLIFLOZIN 10 MG/1
10 TABLET, FILM COATED ORAL DAILY
Qty: 90 | Refills: 3 | Status: ACTIVE | COMMUNITY
Start: 2024-05-23 | End: 1900-01-01

## 2024-06-11 RX ORDER — ASPIRIN 81 MG/1
81 TABLET, DELAYED RELEASE ORAL
Qty: 90 | Refills: 0 | Status: ACTIVE | COMMUNITY
Start: 2024-06-11 | End: 1900-01-01

## 2024-06-11 RX ORDER — SACUBITRIL AND VALSARTAN 24; 26 MG/1; MG/1
24-26 TABLET, FILM COATED ORAL
Qty: 60 | Refills: 1 | Status: ACTIVE | COMMUNITY
Start: 1900-01-01 | End: 1900-01-01

## 2024-06-24 ENCOUNTER — APPOINTMENT (OUTPATIENT)
Dept: CARDIOLOGY | Facility: CLINIC | Age: 89
End: 2024-06-24

## 2024-06-27 ENCOUNTER — NON-APPOINTMENT (OUTPATIENT)
Age: 89
End: 2024-06-27

## 2024-08-13 ENCOUNTER — APPOINTMENT (OUTPATIENT)
Dept: NEUROLOGY | Facility: CLINIC | Age: 89
End: 2024-08-13

## 2024-08-19 ENCOUNTER — APPOINTMENT (OUTPATIENT)
Dept: DERMATOLOGY | Facility: CLINIC | Age: 89
End: 2024-08-19
Payer: MEDICARE

## 2024-08-19 VITALS — BODY MASS INDEX: 22.48 KG/M2 | WEIGHT: 157 LBS | HEIGHT: 70 IN

## 2024-08-19 DIAGNOSIS — L85.3 XEROSIS CUTIS: ICD-10-CM

## 2024-08-19 DIAGNOSIS — L30.9 DERMATITIS, UNSPECIFIED: ICD-10-CM

## 2024-08-19 PROCEDURE — 99214 OFFICE O/P EST MOD 30 MIN: CPT

## 2024-08-19 NOTE — HISTORY OF PRESENT ILLNESS
[FreeTextEntry1] : f/u dermatitis [de-identified] : 89M with a hx of multiple NMSC here for f/u dermatitis. Notes that his skin is stable. Taking prednisone 5mg QD. Still has periodic itching but overall better controlled.  Pt is s/p Mohs surgery 11/2021 to SCCis to L alban w/Dr. Cerna (3 stages), w/incidental focus of BCC noted during Mohs surgery on lateral edge of stage 3, discussed possible Efudex to area, pt did not tolerate surgery well.  Also with HAK on the LLE diagnosed 9/2021  Also has other likely NMSC clinically diagnosed at prior visits. Has elected to clinically monitor these lesions

## 2024-09-24 ENCOUNTER — APPOINTMENT (OUTPATIENT)
Dept: INTERNAL MEDICINE | Facility: CLINIC | Age: 89
End: 2024-09-24
Payer: MEDICARE

## 2024-09-24 ENCOUNTER — NON-APPOINTMENT (OUTPATIENT)
Age: 89
End: 2024-09-24

## 2024-09-24 VITALS
HEIGHT: 70 IN | BODY MASS INDEX: 20.76 KG/M2 | RESPIRATION RATE: 16 BRPM | HEART RATE: 72 BPM | SYSTOLIC BLOOD PRESSURE: 118 MMHG | DIASTOLIC BLOOD PRESSURE: 60 MMHG | WEIGHT: 145 LBS | TEMPERATURE: 97.7 F | OXYGEN SATURATION: 99 %

## 2024-09-24 DIAGNOSIS — R42 DIZZINESS AND GIDDINESS: ICD-10-CM

## 2024-09-24 DIAGNOSIS — I42.9 CARDIOMYOPATHY, UNSPECIFIED: ICD-10-CM

## 2024-09-24 DIAGNOSIS — N18.32 CHRONIC KIDNEY DISEASE, STAGE 3B: ICD-10-CM

## 2024-09-24 DIAGNOSIS — I35.1 NONRHEUMATIC AORTIC (VALVE) INSUFFICIENCY: ICD-10-CM

## 2024-09-24 LAB
ANION GAP SERPL CALC-SCNC: 13 MMOL/L
BUN SERPL-MCNC: 28 MG/DL
CALCIUM SERPL-MCNC: 10.8 MG/DL
CHLORIDE SERPL-SCNC: 104 MMOL/L
CO2 SERPL-SCNC: 25 MMOL/L
CREAT SERPL-MCNC: 1.54 MG/DL
EGFR: 42 ML/MIN/1.73M2
GLUCOSE SERPL-MCNC: 85 MG/DL
MAGNESIUM SERPL-MCNC: 2.1 MG/DL
NT-PROBNP SERPL-MCNC: 2169 PG/ML
POTASSIUM SERPL-SCNC: 4.3 MMOL/L
SODIUM SERPL-SCNC: 143 MMOL/L

## 2024-09-24 PROCEDURE — 99214 OFFICE O/P EST MOD 30 MIN: CPT

## 2024-09-24 NOTE — ASSESSMENT
[FreeTextEntry1] : *Progressive cardiomyopathy of uncertain etiology. EF 30% on entresto farxiga coreg *Left bundle branch block, old. *Moderate aortic regurgitation. Nuclear perfusion study negative for myocardial ischemia 2019. 2023 echo follow-up leaky aortic valve moderate AR mild MR mild to moderate TR EF 52%, and 2023Holter mean 72 bpm, range 48-1 02 with frequent APCs, 15% of beats; and typically 3 beat runs of atrial tachycardia longest 17 beats lasting about 8 seconds.  In 2024 decline in EF to 30% with global hypokinesis. Ddex rate related cardiomyopathy, nonischemic cmpy, rapid progression of CAD. Declined angiography, wonder about CAC to inform statin intensity, not currently tx'd.  Cardiology appropriately initiated Entresto  coreg farxiga, avoided aldactone due to renal disease. Holter 15%APCs at [facility].   Ordered f/u BMP BNP. Anticipate card update echo , pt cancelled 1 month f/u appt with them in June, went to [facility] instead.  *Osteoporosis. T-score -3.6 right femoral neck, November 2023. Risk factors chronic corticosteroid use, chronic kidney disease. Update PTH vitamin D. Willing to televisit with osteoporosis clinic, referral re-sent  *Hypertension. Stable Creatinine 1.3-1.5  *Dry friable skin. Asked to apply cerave cream daily to extremities.  *Chronic disequilibrium. Onset 2020. Patient feels "off" when he lays flat in bed but states that it is worsened when he is up right. He tried walking a mile at the track a couple of weeks ago, having to frequently rest due to deconditioning and declining cardiac function.  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   Wife held his coreg for a couple of weeks without improvement. (held pm dose for a week, then held both for 5 days).   Offered re consultation w neuro, pt reluctant. Provided information. I don't know there is a treatable issue. Stressed cane use at night during nocturia.   Volume depletion, taking alfuzosin in AM potentially contributory. Inconsistently orthostatic. Asked to drink 1.5qt/day, take alfuzosin at bedtime. Trial antevert 25mg, watching for sedation, without benefit.  *Chronic kidney disease stage IIIb. *Secondary hyperparathyroidism.  In 2021 creatinine 1.19, early 2023 1.29, though looking back to 2017 in 2020 creatinine about 1.4. Rising creatinine prompted trial holding ARB summer 2023, cardiology informed, without durable recovery of renal function PTH elevated at 90 vitamin D levels fair though not excellent without history of stone and PSA stable. Nephrology kindly obtained renal ultrasound which showed no hydronephrosis, agreed most likely hypertensive nephropathy. No specific recommendations forthcoming regarding vitamin D supplementation from them, in August we started patient on calcitriol 0.25 mcg daily plus calcium/vitamin D 600-10 twice daily.  PTH, vitamin D, renal panel 5/9/24 reassuring, also without hyperkal or met acidosis.  *Benign prostatic hyperplasia. Sees Dr. Mccoy and urology prescribed alfuzosin 10 mg.  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 Followed by Dr. Floyd hematology every 3 to 6 months. Plat 150-290 past few yrs.  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 Followed closely by Dr. Pickard. Asked to see for Left ear lesion if not healing on rtn. He wishes to taper prednisone, msg'd Dr Kyle.  *Routine adult health maintenance. Has aged out of colon cancer screening, AAA screening, HPV HCV HIV screening. Technically eligible for Tdap Prevnar 20 Shingrix and this fall COVID monovalent booster and influenza. Screening for thyroid disease, diabetes negative 2024.  It was a pleasure to visit with Mr. Sherman today. Answered all questions as best I could. He is understandably frustrated with his dizzyness 6 months f/u. Time 30min

## 2024-10-11 ENCOUNTER — APPOINTMENT (OUTPATIENT)
Dept: OTOLARYNGOLOGY | Facility: CLINIC | Age: 89
End: 2024-10-11
Payer: MEDICARE

## 2024-10-11 VITALS
WEIGHT: 145 LBS | BODY MASS INDEX: 20.76 KG/M2 | DIASTOLIC BLOOD PRESSURE: 62 MMHG | SYSTOLIC BLOOD PRESSURE: 114 MMHG | RESPIRATION RATE: 17 BRPM | HEART RATE: 86 BPM | HEIGHT: 70 IN | OXYGEN SATURATION: 98 %

## 2024-10-11 DIAGNOSIS — H61.23 IMPACTED CERUMEN, BILATERAL: ICD-10-CM

## 2024-10-11 DIAGNOSIS — H90.3 SENSORINEURAL HEARING LOSS, BILATERAL: ICD-10-CM

## 2024-10-11 DIAGNOSIS — R42 DIZZINESS AND GIDDINESS: ICD-10-CM

## 2024-10-11 DIAGNOSIS — R26.89 OTHER ABNORMALITIES OF GAIT AND MOBILITY: ICD-10-CM

## 2024-10-11 DIAGNOSIS — R41.89 OTHER SYMPTOMS AND SIGNS INVOLVING COGNITIVE FUNCTIONS AND AWARENESS: ICD-10-CM

## 2024-10-11 PROCEDURE — 92557 COMPREHENSIVE HEARING TEST: CPT

## 2024-10-11 PROCEDURE — 99204 OFFICE O/P NEW MOD 45 MIN: CPT | Mod: 25

## 2024-10-11 PROCEDURE — G0268 REMOVAL OF IMPACTED WAX MD: CPT

## 2024-10-11 PROCEDURE — 92567 TYMPANOMETRY: CPT

## 2024-11-21 ENCOUNTER — APPOINTMENT (OUTPATIENT)
Dept: DERMATOLOGY | Facility: CLINIC | Age: 89
End: 2024-11-21
Payer: MEDICARE

## 2024-11-21 VITALS — WEIGHT: 145 LBS | HEIGHT: 71 IN | BODY MASS INDEX: 20.3 KG/M2

## 2024-11-21 DIAGNOSIS — I87.2 VENOUS INSUFFICIENCY (CHRONIC) (PERIPHERAL): ICD-10-CM

## 2024-11-21 DIAGNOSIS — L30.9 DERMATITIS, UNSPECIFIED: ICD-10-CM

## 2024-11-21 DIAGNOSIS — L85.3 XEROSIS CUTIS: ICD-10-CM

## 2024-11-21 PROCEDURE — 99214 OFFICE O/P EST MOD 30 MIN: CPT

## 2024-12-02 ENCOUNTER — RX RENEWAL (OUTPATIENT)
Age: 88
End: 2024-12-02

## 2025-02-27 ENCOUNTER — APPOINTMENT (OUTPATIENT)
Dept: DERMATOLOGY | Facility: CLINIC | Age: 89
End: 2025-02-27
Payer: MEDICARE

## 2025-02-27 VITALS — DIASTOLIC BLOOD PRESSURE: 56 MMHG | SYSTOLIC BLOOD PRESSURE: 116 MMHG

## 2025-02-27 DIAGNOSIS — L30.9 DERMATITIS, UNSPECIFIED: ICD-10-CM

## 2025-02-27 DIAGNOSIS — T14.8XXA OTHER INJURY OF UNSPECIFIED BODY REGION, INITIAL ENCOUNTER: ICD-10-CM

## 2025-02-27 DIAGNOSIS — L82.1 OTHER SEBORRHEIC KERATOSIS: ICD-10-CM

## 2025-02-27 DIAGNOSIS — L60.3 NAIL DYSTROPHY: ICD-10-CM

## 2025-02-27 DIAGNOSIS — L85.3 XEROSIS CUTIS: ICD-10-CM

## 2025-02-27 PROCEDURE — 99214 OFFICE O/P EST MOD 30 MIN: CPT

## 2025-02-27 RX ORDER — MUPIROCIN 20 MG/G
2 OINTMENT TOPICAL
Qty: 1 | Refills: 3 | Status: ACTIVE | COMMUNITY
Start: 2025-02-27 | End: 1900-01-01

## 2025-03-05 ENCOUNTER — APPOINTMENT (OUTPATIENT)
Dept: INTERNAL MEDICINE | Facility: CLINIC | Age: 89
End: 2025-03-05
Payer: MEDICARE

## 2025-03-05 VITALS
DIASTOLIC BLOOD PRESSURE: 72 MMHG | WEIGHT: 151 LBS | HEART RATE: 71 BPM | BODY MASS INDEX: 21.14 KG/M2 | RESPIRATION RATE: 17 BRPM | SYSTOLIC BLOOD PRESSURE: 124 MMHG | OXYGEN SATURATION: 97 % | HEIGHT: 71 IN | TEMPERATURE: 97.1 F

## 2025-03-05 DIAGNOSIS — N25.81 SECONDARY HYPERPARATHYROIDISM OF RENAL ORIGIN: ICD-10-CM

## 2025-03-05 DIAGNOSIS — L11.1 TRANSIENT ACANTHOLYTIC DERMATOSIS [GROVER]: ICD-10-CM

## 2025-03-05 DIAGNOSIS — N18.32 CHRONIC KIDNEY DISEASE, STAGE 3B: ICD-10-CM

## 2025-03-05 DIAGNOSIS — I42.9 CARDIOMYOPATHY, UNSPECIFIED: ICD-10-CM

## 2025-03-05 DIAGNOSIS — H61.23 IMPACTED CERUMEN, BILATERAL: ICD-10-CM

## 2025-03-05 DIAGNOSIS — H90.3 SENSORINEURAL HEARING LOSS, BILATERAL: ICD-10-CM

## 2025-03-05 DIAGNOSIS — I10 ESSENTIAL (PRIMARY) HYPERTENSION: ICD-10-CM

## 2025-03-05 DIAGNOSIS — N40.0 BENIGN PROSTATIC HYPERPLASIA WITHOUT LOWER URINARY TRACT SYMPMS: ICD-10-CM

## 2025-03-05 DIAGNOSIS — D69.6 THROMBOCYTOPENIA, UNSPECIFIED: ICD-10-CM

## 2025-03-05 DIAGNOSIS — I87.2 VENOUS INSUFFICIENCY (CHRONIC) (PERIPHERAL): ICD-10-CM

## 2025-03-05 PROCEDURE — 99214 OFFICE O/P EST MOD 30 MIN: CPT

## 2025-06-03 ENCOUNTER — APPOINTMENT (OUTPATIENT)
Dept: INTERNAL MEDICINE | Facility: CLINIC | Age: 89
End: 2025-06-03
Payer: MEDICARE

## 2025-06-03 VITALS
WEIGHT: 147 LBS | RESPIRATION RATE: 16 BRPM | SYSTOLIC BLOOD PRESSURE: 126 MMHG | HEIGHT: 71 IN | OXYGEN SATURATION: 98 % | DIASTOLIC BLOOD PRESSURE: 74 MMHG | TEMPERATURE: 97.2 F | BODY MASS INDEX: 20.58 KG/M2 | HEART RATE: 86 BPM

## 2025-06-03 DIAGNOSIS — N40.0 BENIGN PROSTATIC HYPERPLASIA WITHOUT LOWER URINARY TRACT SYMPMS: ICD-10-CM

## 2025-06-03 DIAGNOSIS — L30.9 DERMATITIS, UNSPECIFIED: ICD-10-CM

## 2025-06-03 DIAGNOSIS — I42.9 CARDIOMYOPATHY, UNSPECIFIED: ICD-10-CM

## 2025-06-03 DIAGNOSIS — I87.2 VENOUS INSUFFICIENCY (CHRONIC) (PERIPHERAL): ICD-10-CM

## 2025-06-03 DIAGNOSIS — Z00.00 ENCOUNTER FOR GENERAL ADULT MEDICAL EXAMINATION W/OUT ABNORMAL FINDINGS: ICD-10-CM

## 2025-06-03 DIAGNOSIS — N18.32 CHRONIC KIDNEY DISEASE, STAGE 3B: ICD-10-CM

## 2025-06-03 DIAGNOSIS — R42 DIZZINESS AND GIDDINESS: ICD-10-CM

## 2025-06-03 PROCEDURE — 99213 OFFICE O/P EST LOW 20 MIN: CPT

## 2025-06-12 LAB
24R-OH-CALCIDIOL SERPL-MCNC: 30.6 PG/ML
25(OH)D3 SERPL-MCNC: 35.1 NG/ML
ALBUMIN SERPL ELPH-MCNC: 4 G/DL
ALP BLD-CCNC: 60 U/L
ALT SERPL-CCNC: 16 U/L
ANION GAP SERPL CALC-SCNC: 16 MMOL/L
APPEARANCE: CLEAR
AST SERPL-CCNC: 19 U/L
BASOPHILS # BLD AUTO: 0.04 K/UL
BASOPHILS NFR BLD AUTO: 0.6 %
BILIRUB SERPL-MCNC: 0.9 MG/DL
BILIRUBIN URINE: NEGATIVE
BLOOD URINE: NEGATIVE
BUN SERPL-MCNC: 31 MG/DL
CALCIUM SERPL-MCNC: 10.1 MG/DL
CHLORIDE SERPL-SCNC: 104 MMOL/L
CHOLEST SERPL-MCNC: 145 MG/DL
CO2 SERPL-SCNC: 22 MMOL/L
COLOR: YELLOW
CREAT SERPL-MCNC: 1.61 MG/DL
CREAT SPEC-SCNC: 116 MG/DL
EGFRCR SERPLBLD CKD-EPI 2021: 39 ML/MIN/1.73M2
EOSINOPHIL # BLD AUTO: 0.3 K/UL
EOSINOPHIL NFR BLD AUTO: 4.7 %
ESTIMATED AVERAGE GLUCOSE: 117 MG/DL
FOLATE SERPL-MCNC: 11.5 NG/ML
GLUCOSE QUALITATIVE U: >=1000 MG/DL
GLUCOSE SERPL-MCNC: 99 MG/DL
HBA1C MFR BLD HPLC: 5.7 %
HCT VFR BLD CALC: 45.1 %
HDLC SERPL-MCNC: 52 MG/DL
HGB BLD-MCNC: 15 G/DL
IMM GRANULOCYTES NFR BLD AUTO: 0.3 %
KETONES URINE: NEGATIVE MG/DL
LDLC SERPL-MCNC: 82 MG/DL
LEUKOCYTE ESTERASE URINE: NEGATIVE
LYMPHOCYTES # BLD AUTO: 1.26 K/UL
LYMPHOCYTES NFR BLD AUTO: 19.8 %
MAN DIFF?: NORMAL
MCHC RBC-ENTMCNC: 32.1 PG
MCHC RBC-ENTMCNC: 33.3 G/DL
MCV RBC AUTO: 96.4 FL
MICROALBUMIN 24H UR DL<=1MG/L-MCNC: <1.2 MG/DL
MICROALBUMIN/CREAT 24H UR-RTO: NORMAL MG/G
MONOCYTES # BLD AUTO: 0.76 K/UL
MONOCYTES NFR BLD AUTO: 11.9 %
NEUTROPHILS # BLD AUTO: 3.98 K/UL
NEUTROPHILS NFR BLD AUTO: 62.7 %
NITRITE URINE: NEGATIVE
NONHDLC SERPL-MCNC: 93 MG/DL
NT-PROBNP SERPL-MCNC: 514 PG/ML
PH URINE: 5.5
PLATELET # BLD AUTO: 176 K/UL
POTASSIUM SERPL-SCNC: 4.3 MMOL/L
PROT SERPL-MCNC: 6.4 G/DL
PROTEIN URINE: NEGATIVE MG/DL
PSA FREE FLD-MCNC: 32 %
PSA FREE SERPL-MCNC: 1 NG/ML
PSA SERPL-MCNC: 3.16 NG/ML
RBC # BLD: 4.68 M/UL
RBC # FLD: 13.7 %
SODIUM SERPL-SCNC: 141 MMOL/L
SPECIFIC GRAVITY URINE: 1.02
TRIGL SERPL-MCNC: 51 MG/DL
TSH SERPL-ACNC: 3.96 UIU/ML
UROBILINOGEN URINE: 0.2 MG/DL
VIT B12 SERPL-MCNC: 398 PG/ML
WBC # FLD AUTO: 6.36 K/UL

## 2025-06-13 ENCOUNTER — NON-APPOINTMENT (OUTPATIENT)
Age: 89
End: 2025-06-13

## 2025-06-17 ENCOUNTER — APPOINTMENT (OUTPATIENT)
Dept: INTERNAL MEDICINE | Facility: CLINIC | Age: 89
End: 2025-06-17
Payer: MEDICARE

## 2025-06-17 VITALS
BODY MASS INDEX: 20.58 KG/M2 | OXYGEN SATURATION: 96 % | DIASTOLIC BLOOD PRESSURE: 80 MMHG | SYSTOLIC BLOOD PRESSURE: 118 MMHG | WEIGHT: 147 LBS | HEART RATE: 100 BPM | RESPIRATION RATE: 16 BRPM | TEMPERATURE: 97.9 F | HEIGHT: 71 IN

## 2025-06-17 PROCEDURE — 99213 OFFICE O/P EST LOW 20 MIN: CPT

## 2025-09-05 ENCOUNTER — RX RENEWAL (OUTPATIENT)
Age: 89
End: 2025-09-05